# Patient Record
Sex: FEMALE | Race: WHITE | NOT HISPANIC OR LATINO | Employment: OTHER | URBAN - METROPOLITAN AREA
[De-identification: names, ages, dates, MRNs, and addresses within clinical notes are randomized per-mention and may not be internally consistent; named-entity substitution may affect disease eponyms.]

---

## 2017-10-04 ENCOUNTER — APPOINTMENT (OUTPATIENT)
Dept: WOMENS IMAGING | Facility: HOSPITAL | Age: 77
End: 2017-10-04

## 2017-10-04 PROCEDURE — G0202 SCR MAMMO BI INCL CAD: HCPCS | Performed by: RADIOLOGY

## 2017-10-04 PROCEDURE — MDREVIEWSP: Performed by: RADIOLOGY

## 2017-10-04 PROCEDURE — 77063 BREAST TOMOSYNTHESIS BI: CPT | Performed by: RADIOLOGY

## 2017-12-11 ENCOUNTER — APPOINTMENT (OUTPATIENT)
Dept: GENERAL RADIOLOGY | Facility: HOSPITAL | Age: 77
End: 2017-12-11

## 2017-12-11 PROCEDURE — 74022 RADEX COMPL AQT ABD SERIES: CPT | Performed by: FAMILY MEDICINE

## 2018-10-08 ENCOUNTER — APPOINTMENT (OUTPATIENT)
Dept: WOMENS IMAGING | Facility: HOSPITAL | Age: 78
End: 2018-10-08

## 2018-10-08 PROCEDURE — 77063 BREAST TOMOSYNTHESIS BI: CPT | Performed by: RADIOLOGY

## 2018-10-08 PROCEDURE — 77067 SCR MAMMO BI INCL CAD: CPT | Performed by: RADIOLOGY

## 2018-10-08 PROCEDURE — MDREVIEWSP: Performed by: RADIOLOGY

## 2018-12-17 ENCOUNTER — HOSPITAL ENCOUNTER (EMERGENCY)
Facility: HOSPITAL | Age: 78
Discharge: HOME | End: 2018-12-17
Attending: EMERGENCY MEDICINE | Admitting: EMERGENCY MEDICINE

## 2018-12-17 ENCOUNTER — APPOINTMENT (OUTPATIENT)
Dept: CT IMAGING | Facility: HOSPITAL | Age: 78
End: 2018-12-17

## 2018-12-17 VITALS
DIASTOLIC BLOOD PRESSURE: 88 MMHG | HEART RATE: 75 BPM | RESPIRATION RATE: 15 BRPM | WEIGHT: 190 LBS | HEIGHT: 63 IN | OXYGEN SATURATION: 98 % | TEMPERATURE: 98 F | BODY MASS INDEX: 33.66 KG/M2 | SYSTOLIC BLOOD PRESSURE: 164 MMHG

## 2018-12-17 DIAGNOSIS — R10.9 LEFT FLANK PAIN: Primary | ICD-10-CM

## 2018-12-17 LAB
ALBUMIN SERPL-MCNC: 4.4 G/DL (ref 3.5–5.2)
ALBUMIN/GLOB SERPL: 1.3 G/DL
ALP SERPL-CCNC: 83 U/L (ref 39–117)
ALT SERPL W P-5'-P-CCNC: 21 U/L (ref 1–33)
ANION GAP SERPL CALCULATED.3IONS-SCNC: 10.8 MMOL/L
AST SERPL-CCNC: 25 U/L (ref 1–32)
BASOPHILS # BLD AUTO: 0.02 10*3/MM3 (ref 0–0.2)
BASOPHILS NFR BLD AUTO: 0.2 % (ref 0–1.5)
BILIRUB SERPL-MCNC: 0.6 MG/DL (ref 0.1–1.2)
BILIRUB UR QL STRIP: NEGATIVE
BUN BLD-MCNC: 19 MG/DL (ref 8–23)
BUN/CREAT SERPL: 21.3 (ref 7–25)
CALCIUM SPEC-SCNC: 10.1 MG/DL (ref 8.6–10.5)
CHLORIDE SERPL-SCNC: 102 MMOL/L (ref 98–107)
CLARITY UR: CLEAR
CO2 SERPL-SCNC: 26.2 MMOL/L (ref 22–29)
COLOR UR: YELLOW
CREAT BLD-MCNC: 0.89 MG/DL (ref 0.57–1)
DEPRECATED RDW RBC AUTO: 49.3 FL (ref 37–54)
EOSINOPHIL # BLD AUTO: 0.14 10*3/MM3 (ref 0–0.7)
EOSINOPHIL NFR BLD AUTO: 1.6 % (ref 0.3–6.2)
ERYTHROCYTE [DISTWIDTH] IN BLOOD BY AUTOMATED COUNT: 14.6 % (ref 11.7–13)
GFR SERPL CREATININE-BSD FRML MDRD: 61 ML/MIN/1.73
GLOBULIN UR ELPH-MCNC: 3.3 GM/DL
GLUCOSE BLD-MCNC: 100 MG/DL (ref 65–99)
GLUCOSE UR STRIP-MCNC: NEGATIVE MG/DL
HCT VFR BLD AUTO: 39.8 % (ref 35.6–45.5)
HGB BLD-MCNC: 13.4 G/DL (ref 11.9–15.5)
HGB UR QL STRIP.AUTO: NEGATIVE
IMM GRANULOCYTES # BLD: 0.01 10*3/MM3 (ref 0–0.03)
IMM GRANULOCYTES NFR BLD: 0.1 % (ref 0–0.5)
KETONES UR QL STRIP: NEGATIVE
LEUKOCYTE ESTERASE UR QL STRIP.AUTO: NEGATIVE
LIPASE SERPL-CCNC: 38 U/L (ref 13–60)
LYMPHOCYTES # BLD AUTO: 2.19 10*3/MM3 (ref 0.9–4.8)
LYMPHOCYTES NFR BLD AUTO: 24.8 % (ref 19.6–45.3)
MCH RBC QN AUTO: 31.1 PG (ref 26.9–32)
MCHC RBC AUTO-ENTMCNC: 33.7 G/DL (ref 32.4–36.3)
MCV RBC AUTO: 92.3 FL (ref 80.5–98.2)
MONOCYTES # BLD AUTO: 0.59 10*3/MM3 (ref 0.2–1.2)
MONOCYTES NFR BLD AUTO: 6.7 % (ref 5–12)
NEUTROPHILS # BLD AUTO: 5.89 10*3/MM3 (ref 1.9–8.1)
NEUTROPHILS NFR BLD AUTO: 66.7 % (ref 42.7–76)
NITRITE UR QL STRIP: NEGATIVE
PH UR STRIP.AUTO: 7 [PH] (ref 5–8)
PLATELET # BLD AUTO: 255 10*3/MM3 (ref 140–500)
PMV BLD AUTO: 9.4 FL (ref 6–12)
POTASSIUM BLD-SCNC: 4.1 MMOL/L (ref 3.5–5.2)
PROT SERPL-MCNC: 7.7 G/DL (ref 6–8.5)
PROT UR QL STRIP: NEGATIVE
RBC # BLD AUTO: 4.31 10*6/MM3 (ref 3.9–5.2)
SODIUM BLD-SCNC: 139 MMOL/L (ref 136–145)
SP GR UR STRIP: 1.02 (ref 1–1.03)
UROBILINOGEN UR QL STRIP: NORMAL
WBC NRBC COR # BLD: 8.83 10*3/MM3 (ref 4.5–10.7)

## 2018-12-17 PROCEDURE — 83690 ASSAY OF LIPASE: CPT | Performed by: EMERGENCY MEDICINE

## 2018-12-17 PROCEDURE — 80053 COMPREHEN METABOLIC PANEL: CPT | Performed by: EMERGENCY MEDICINE

## 2018-12-17 PROCEDURE — 99283 EMERGENCY DEPT VISIT LOW MDM: CPT

## 2018-12-17 PROCEDURE — 74176 CT ABD & PELVIS W/O CONTRAST: CPT

## 2018-12-17 PROCEDURE — 85025 COMPLETE CBC W/AUTO DIFF WBC: CPT | Performed by: EMERGENCY MEDICINE

## 2018-12-17 PROCEDURE — 81003 URINALYSIS AUTO W/O SCOPE: CPT | Performed by: EMERGENCY MEDICINE

## 2018-12-17 NOTE — ED PROVIDER NOTES
" EMERGENCY DEPARTMENT ENCOUNTER    CHIEF COMPLAINT  Chief Complaint: left flank pain  History given by: patient  History limited by: none  Room Number: 16/16  PMD: Damaris Valenzuela APRN      HPI:  Pt is a 78 y.o. female who presents complaining of left flank pain that began 2 days ago. Pt was seen by [facility] around 1200 and referred to ED for further evaluation. Pt states pain subsided around 1600. Pt denies urinary sx and recent falls/trauma.         Duration:  2 days ago  Onset: gradual  Timing: constant  Location: left  Radiation: none  Quality: pain  Intensity/Severity: moderate  Progression: resolved  Associated Symptoms: none  Aggravating Factors: none  Alleviating Factors: none  Previous Episodes: none  Treatment before arrival: Pt was seen at [facility] around 1200 and referred to ED for further evaluation.     PAST MEDICAL HISTORY  Active Ambulatory Problems     Diagnosis Date Noted   • No Active Ambulatory Problems     Resolved Ambulatory Problems     Diagnosis Date Noted   • No Resolved Ambulatory Problems     Past Medical History:   Diagnosis Date   • Arthritis    • C. difficile colitis    • Disease of thyroid gland    • H/O uterine prolapse        PAST SURGICAL HISTORY  Past Surgical History:   Procedure Laterality Date   • CHOLECYSTECTOMY     • JOINT REPLACEMENT     • UTERINE FIBROID SURGERY      \"pesary\" device used to hold uterus in place due to prolapse       FAMILY HISTORY  Family History   Problem Relation Age of Onset   • Cancer Mother    • Heart attack Father    • Migraines Daughter        SOCIAL HISTORY  Social History     Socioeconomic History   • Marital status: Single     Spouse name: Not on file   • Number of children: Not on file   • Years of education: Not on file   • Highest education level: Not on file   Social Needs   • Financial resource strain: Not on file   • Food insecurity - worry: Not on file   • Food insecurity - inability: Not on file   • Transportation needs - medical: Not on file "   • Transportation needs - non-medical: Not on file   Occupational History   • Not on file   Tobacco Use   • Smoking status: Never Smoker   • Smokeless tobacco: Never Used   Substance and Sexual Activity   • Alcohol use: No   • Drug use: Defer   • Sexual activity: Defer   Other Topics Concern   • Not on file   Social History Narrative   • Not on file       ALLERGIES  Patient has no known allergies.    REVIEW OF SYSTEMS  Review of Systems   Constitutional: Negative for fever.   HENT: Negative for sore throat.    Eyes: Negative.    Respiratory: Negative for cough and shortness of breath.    Cardiovascular: Negative for chest pain.   Gastrointestinal: Negative for abdominal pain, diarrhea and vomiting.   Genitourinary: Positive for flank pain (left). Negative for dysuria.   Musculoskeletal: Negative for neck pain.   Skin: Negative for rash.   Allergic/Immunologic: Negative.    Neurological: Negative for weakness, numbness and headaches.   Hematological: Negative.    Psychiatric/Behavioral: Negative.    All other systems reviewed and are negative.      PHYSICAL EXAM  ED Triage Vitals   Temp Heart Rate Resp BP SpO2   12/17/18 1250 12/17/18 1250 12/17/18 1250 12/17/18 1420 12/17/18 1250   98.1 °F (36.7 °C) 84 16 136/95 97 %      Temp src Heart Rate Source Patient Position BP Location FiO2 (%)   12/17/18 1250 12/17/18 1250 -- -- --   Tympanic Monitor          Physical Exam   Constitutional: She is oriented to person, place, and time. No distress.   HENT:   Head: Normocephalic and atraumatic.   Eyes: EOM are normal. Pupils are equal, round, and reactive to light.   Neck: Normal range of motion. Neck supple.   Cardiovascular: Normal rate, regular rhythm and normal heart sounds.   Pulmonary/Chest: Effort normal and breath sounds normal. No respiratory distress.   Abdominal: Soft. There is no tenderness. There is no rebound and no guarding.   Musculoskeletal: Normal range of motion. She exhibits no edema.   Neurological: She  is alert and oriented to person, place, and time. She has normal sensation and normal strength.   Negative SLR bilaterally   Skin: Skin is warm and dry. No rash noted.   Psychiatric: Mood and affect normal.   Nursing note and vitals reviewed.      LAB RESULTS  Lab Results (last 24 hours)     Procedure Component Value Units Date/Time    POC Urinalysis Dipstick, Multipro (Automated dipstick) [[id number]]  (Abnormal) Collected:  12/17/18 1201    Specimen:  Urine Updated:  12/17/18 1202     Color Yellow     Clarity, UA Slightly Cloudy     Glucose, UA Negative mg/dL      Bilirubin Negative     Ketones, UA Negative     Specific Gravity  1.023     Blood, UA Small     pH, Urine 5.5     Protein, POC Negative mg/dL      Urobilinogen, UA Normal     Nitrite, UA Negative     Leukocytes Negative    CBC & Differential [[id number]] Collected:  12/17/18 1555    Specimen:  Blood Updated:  12/17/18 1613    Narrative:       The following orders were created for panel order CBC & Differential.  Procedure                               Abnormality         Status                     ---------                               -----------         ------                     CBC Auto Differential[[id number]]         Abnormal            Final result                 Please view results for these tests on the individual orders.    Comprehensive Metabolic Panel [[id number]]  (Abnormal) Collected:  12/17/18 1555    Specimen:  Blood from Arm, Left Updated:  12/17/18 1638     Glucose 100 mg/dL      BUN 19 mg/dL      Creatinine 0.89 mg/dL      Sodium 139 mmol/L      Potassium 4.1 mmol/L      Chloride 102 mmol/L      CO2 26.2 mmol/L      Calcium 10.1 mg/dL      Total Protein 7.7 g/dL      Albumin 4.40 g/dL      ALT (SGPT) 21 U/L      AST (SGOT) 25 U/L      Alkaline Phosphatase 83 U/L      Total Bilirubin 0.6 mg/dL      eGFR Non African Amer 61 mL/min/1.73      Globulin 3.3 gm/dL      A/G Ratio 1.3 g/dL      BUN/Creatinine Ratio 21.3     Anion Gap 10.8 mmol/L      Narrative:       The MDRD GFR formula is only valid for adults with stable renal function between ages 18 and 70.    Urinalysis With Culture If Indicated - Urine, Clean Catch [[id number]]  (Normal) Collected:  12/17/18 1555    Specimen:  Urine, Clean Catch Updated:  12/17/18 1618     Color, UA Yellow     Appearance, UA Clear     pH, UA 7.0     Specific Gravity, UA 1.018     Glucose, UA Negative     Ketones, UA Negative     Bilirubin, UA Negative     Blood, UA Negative     Protein, UA Negative     Leuk Esterase, UA Negative     Nitrite, UA Negative     Urobilinogen, UA 1.0 E.U./dL    Narrative:       Urine microscopic not indicated.    CBC Auto Differential [[id number]]  (Abnormal) Collected:  12/17/18 1555    Specimen:  Blood from Arm, Left Updated:  12/17/18 1613     WBC 8.83 10*3/mm3      RBC 4.31 10*6/mm3      Hemoglobin 13.4 g/dL      Hematocrit 39.8 %      MCV 92.3 fL      MCH 31.1 pg      MCHC 33.7 g/dL      RDW 14.6 %      RDW-SD 49.3 fl      MPV 9.4 fL      Platelets 255 10*3/mm3      Neutrophil % 66.7 %      Lymphocyte % 24.8 %      Monocyte % 6.7 %      Eosinophil % 1.6 %      Basophil % 0.2 %      Immature Grans % 0.1 %      Neutrophils, Absolute 5.89 10*3/mm3      Lymphocytes, Absolute 2.19 10*3/mm3      Monocytes, Absolute 0.59 10*3/mm3      Eosinophils, Absolute 0.14 10*3/mm3      Basophils, Absolute 0.02 10*3/mm3      Immature Grans, Absolute 0.01 10*3/mm3     Lipase [[id number]]  (Normal) Collected:  12/17/18 1555    Specimen:  Blood from Arm, Left Updated:  12/17/18 1638     Lipase 38 U/L           I ordered the above labs and reviewed the results    RADIOLOGY  CT Abdomen Pelvis Without Contrast   Preliminary Result   1. 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 There is no   hydronephrosis.   2. Moderately extensive colonic diverticulosis without evidence for   diverticulitis.   3. Lumbar dextroscoliosis and advanced multilevel spondylosis.       Discussed with Dr. Chin.               I  ordered the above noted radiological studies. Interpreted by radiologist. Reviewed by me in PACS.       PROCEDURES  Procedures      PROGRESS AND CONSULTS     1644-. Notified pt of unremarkable lab work and CT abd/pelvis which shows a horseshoe kidney but is negative acute. Pt states she was aware she had a horseshoe kidney. Discussed the plan to discharge the pt home. I instructed the pt to drink plenty of fluids and f/u with her PCP. Pt understands and agrees with the plan, all questions answered.        MEDICAL DECISION MAKING  Results were reviewed/discussed with the patient and they were also made aware of online access. Pt also made aware that some labs, such as cultures, will not be resulted during ER visit and follow up with PMD is necessary.     MDM  Number of Diagnoses or Management Options  Left flank pain:      Amount and/or Complexity of Data Reviewed  Clinical lab tests: reviewed and ordered (WBC-8.83  UA-negative)  Tests in the radiology section of CPT®: reviewed and ordered (CT abd/pelvis-horseshoe kidney, negative acute)  Decide to obtain previous medical records or to obtain history from someone other than the patient: yes  Review and summarize past medical records: yes ([facility] note from  shows Blood, UA-small )  Independent visualization of images, tracings, or specimens: yes           DIAGNOSIS  Final diagnoses:   Left flank pain       DISPOSITION  DISCHARGE    Patient discharged in stable condition.    Reviewed implications of results, diagnosis, meds, responsibility to follow up, warning signs and symptoms of possible worsening, potential complications and reasons to return to ER.    Patient/Family voiced understanding of above instructions.    Discussed plan for discharge, as there is no emergent indication for admission. Patient referred to primary care provider for BP management due to today's BP. Pt/family is agreeable and understands need for follow up and repeat testing.  Pt is aware  that discharge does not mean that nothing is wrong but it indicates no emergency is present that requires admission and they must continue care with follow-up as given below or physician of their choice.     FOLLOW-UP  Damaris Valenzuela APRN  [address] [place] [address] [address]  [facility] [address] [phone]    Schedule an appointment as soon as possible for a visit   If symptoms worsen or return         Medication List      No changes were made to your prescriptions during this visit.           Latest Documented Vital Signs:  As of 4:49 PM  BP- 164/88 HR- 78 Temp- 98.1 °F (36.7 °C) (Tympanic) O2 sat- 98%    --  Documentation assistance provided by louise Murrell for .  Information recorded by the scribe was done at my direction and has been verified and validated by me.     Rani Murrell  12/17/18 165       Elias Patel MD  12/17/18 9260

## 2018-12-17 NOTE — ED NOTES
Pt states she was having some lower flank pain, pt was seen at Urgent care center where she was seen. Pt was told to come here due to blood in urine. Pt states she is having no pain. Pt appears in NAD; pt's breathing is even and unlabored. Call light in reach and bed at lowest position. Reassurance given. Family at bedside.        Josefa España RN  12/17/18 6363

## 2019-01-07 ENCOUNTER — APPOINTMENT (OUTPATIENT)
Dept: GENERAL RADIOLOGY | Facility: HOSPITAL | Age: 79
End: 2019-01-07

## 2019-01-07 PROCEDURE — 71046 X-RAY EXAM CHEST 2 VIEWS: CPT | Performed by: GENERAL PRACTICE

## 2019-10-10 ENCOUNTER — APPOINTMENT (OUTPATIENT)
Dept: WOMENS IMAGING | Facility: HOSPITAL | Age: 79
End: 2019-10-10

## 2019-10-10 PROCEDURE — MDREVIEWSP: Performed by: RADIOLOGY

## 2019-10-10 PROCEDURE — 77063 BREAST TOMOSYNTHESIS BI: CPT | Performed by: RADIOLOGY

## 2019-10-10 PROCEDURE — 77067 SCR MAMMO BI INCL CAD: CPT | Performed by: RADIOLOGY

## 2019-10-18 ENCOUNTER — TRANSCRIBE ORDERS (OUTPATIENT)
Dept: ADMINISTRATIVE | Facility: HOSPITAL | Age: 79
End: 2019-10-18

## 2019-10-18 DIAGNOSIS — Z13.6 ENCOUNTER FOR SCREENING FOR VASCULAR DISEASE: Primary | ICD-10-CM

## 2019-10-31 ENCOUNTER — HOSPITAL ENCOUNTER (OUTPATIENT)
Dept: CARDIOLOGY | Facility: HOSPITAL | Age: 79
Discharge: HOME | End: 2019-10-31
Admitting: NURSE PRACTITIONER

## 2019-10-31 VITALS
HEART RATE: 75 BPM | DIASTOLIC BLOOD PRESSURE: 68 MMHG | HEIGHT: 62 IN | BODY MASS INDEX: 38.83 KG/M2 | WEIGHT: 211 LBS | SYSTOLIC BLOOD PRESSURE: 127 MMHG

## 2019-10-31 DIAGNOSIS — Z13.6 ENCOUNTER FOR SCREENING FOR VASCULAR DISEASE: ICD-10-CM

## 2019-10-31 LAB
BH CV ECHO MEAS - DIST AO DIAM: 1.4 CM
BH CV VAS BP LEFT ARM: NORMAL MMHG
BH CV VAS BP RIGHT ARM: NORMAL MMHG
BH CV XLRA MEAS - MID AO DIAM: 1.69 CM
BH CV XLRA MEAS - PAD LEFT ABI DP: 1.18
BH CV XLRA MEAS - PAD LEFT ABI PT: 1.18
BH CV XLRA MEAS - PAD LEFT ARM: 127 MMHG
BH CV XLRA MEAS - PAD LEFT LEG DP: 150 MMHG
BH CV XLRA MEAS - PAD LEFT LEG PT: 150 MMHG
BH CV XLRA MEAS - PAD RIGHT ABI DP: 1.18
BH CV XLRA MEAS - PAD RIGHT ABI PT: 1.18
BH CV XLRA MEAS - PAD RIGHT ARM: 123 MMHG
BH CV XLRA MEAS - PAD RIGHT LEG DP: 150 MMHG
BH CV XLRA MEAS - PAD RIGHT LEG PT: 150 MMHG
BH CV XLRA MEAS - PROX AO DIAM: 1.87 CM
BH CV XLRA MEAS LEFT ICA/CCA RATIO: 1.16
BH CV XLRA MEAS LEFT MID CCA PSV: NORMAL CM/SEC
BH CV XLRA MEAS LEFT MID ICA PSV: NORMAL CM/SEC
BH CV XLRA MEAS LEFT PROX ECA PSV: NORMAL CM/SEC
BH CV XLRA MEAS RIGHT ICA/CCA RATIO: 0.8
BH CV XLRA MEAS RIGHT MID CCA PSV: NORMAL CM/SEC
BH CV XLRA MEAS RIGHT MID ICA PSV: NORMAL CM/SEC
BH CV XLRA MEAS RIGHT PROX ECA PSV: NORMAL CM/SEC

## 2019-10-31 PROCEDURE — 93799 UNLISTED CV SVC/PROCEDURE: CPT

## 2020-10-14 ENCOUNTER — APPOINTMENT (OUTPATIENT)
Dept: WOMENS IMAGING | Facility: HOSPITAL | Age: 80
End: 2020-10-14

## 2020-10-14 PROCEDURE — 77063 BREAST TOMOSYNTHESIS BI: CPT | Performed by: RADIOLOGY

## 2020-10-14 PROCEDURE — 77067 SCR MAMMO BI INCL CAD: CPT | Performed by: RADIOLOGY

## 2021-05-17 ENCOUNTER — OFFICE (OUTPATIENT)
Dept: URBAN - METROPOLITAN AREA CLINIC 66 | Facility: CLINIC | Age: 81
End: 2021-05-17

## 2021-05-17 VITALS
DIASTOLIC BLOOD PRESSURE: 75 MMHG | TEMPERATURE: 97.2 F | WEIGHT: 209 LBS | SYSTOLIC BLOOD PRESSURE: 120 MMHG | HEART RATE: 81 BPM | HEIGHT: 63 IN

## 2021-05-17 DIAGNOSIS — K59.00 CONSTIPATION, UNSPECIFIED: ICD-10-CM

## 2021-05-17 DIAGNOSIS — R13.10 DYSPHAGIA, UNSPECIFIED: ICD-10-CM

## 2021-05-17 DIAGNOSIS — R05 COUGH: ICD-10-CM

## 2021-05-17 PROCEDURE — 99203 OFFICE O/P NEW LOW 30 MIN: CPT | Performed by: NURSE PRACTITIONER

## 2021-05-19 ENCOUNTER — TRANSCRIBE ORDERS (OUTPATIENT)
Dept: SLEEP MEDICINE | Facility: HOSPITAL | Age: 81
End: 2021-05-19

## 2021-05-19 DIAGNOSIS — Z01.818 OTHER SPECIFIED PRE-OPERATIVE EXAMINATION: Primary | ICD-10-CM

## 2021-05-22 ENCOUNTER — APPOINTMENT (OUTPATIENT)
Dept: LAB | Facility: HOSPITAL | Age: 81
End: 2021-05-22

## 2021-05-22 ENCOUNTER — LAB (OUTPATIENT)
Dept: LAB | Facility: HOSPITAL | Age: 81
End: 2021-05-22

## 2021-05-22 DIAGNOSIS — Z01.818 OTHER SPECIFIED PRE-OPERATIVE EXAMINATION: ICD-10-CM

## 2021-05-22 PROCEDURE — U0004 COV-19 TEST NON-CDC HGH THRU: HCPCS

## 2021-05-22 PROCEDURE — C9803 HOPD COVID-19 SPEC COLLECT: HCPCS

## 2021-05-23 LAB — SARS-COV-2 ORF1AB RESP QL NAA+PROBE: NOT DETECTED

## 2021-05-24 RX ORDER — PANTOPRAZOLE SODIUM 40 MG/1
40 TABLET, DELAYED RELEASE ORAL AS NEEDED
COMMUNITY

## 2021-05-25 ENCOUNTER — HOSPITAL ENCOUNTER (OUTPATIENT)
Facility: HOSPITAL | Age: 81
Setting detail: HOSPITAL OUTPATIENT SURGERY
Discharge: HOME | End: 2021-05-25
Attending: INTERNAL MEDICINE | Admitting: INTERNAL MEDICINE

## 2021-05-25 ENCOUNTER — ANESTHESIA EVENT (OUTPATIENT)
Dept: GASTROENTEROLOGY | Facility: HOSPITAL | Age: 81
End: 2021-05-25

## 2021-05-25 ENCOUNTER — ON CAMPUS - OUTPATIENT (OUTPATIENT)
Dept: URBAN - METROPOLITAN AREA HOSPITAL 114 | Facility: HOSPITAL | Age: 81
End: 2021-05-25

## 2021-05-25 ENCOUNTER — ANESTHESIA (OUTPATIENT)
Dept: GASTROENTEROLOGY | Facility: HOSPITAL | Age: 81
End: 2021-05-25

## 2021-05-25 VITALS
BODY MASS INDEX: 35.58 KG/M2 | HEIGHT: 64 IN | TEMPERATURE: 98.6 F | WEIGHT: 208.4 LBS | SYSTOLIC BLOOD PRESSURE: 135 MMHG | HEART RATE: 70 BPM | RESPIRATION RATE: 12 BRPM | OXYGEN SATURATION: 95 % | DIASTOLIC BLOOD PRESSURE: 77 MMHG

## 2021-05-25 DIAGNOSIS — K22.4 DYSKINESIA OF ESOPHAGUS: ICD-10-CM

## 2021-05-25 DIAGNOSIS — R13.14 DYSPHAGIA, PHARYNGOESOPHAGEAL PHASE: ICD-10-CM

## 2021-05-25 DIAGNOSIS — K22.2 ESOPHAGEAL OBSTRUCTION: ICD-10-CM

## 2021-05-25 PROCEDURE — 43450 DILATE ESOPHAGUS 1/MULT PASS: CPT | Performed by: INTERNAL MEDICINE

## 2021-05-25 PROCEDURE — 43235 EGD DIAGNOSTIC BRUSH WASH: CPT | Performed by: INTERNAL MEDICINE

## 2021-05-25 PROCEDURE — 25010000002 PROPOFOL 10 MG/ML EMULSION: Performed by: ANESTHESIOLOGY

## 2021-05-25 RX ORDER — SODIUM CHLORIDE 0.9 % (FLUSH) 0.9 %
3 SYRINGE (ML) INJECTION EVERY 12 HOURS SCHEDULED
Status: DISCONTINUED | OUTPATIENT
Start: 2021-05-25 | End: 2021-05-25 | Stop reason: HOSPADM

## 2021-05-25 RX ORDER — PROPOFOL 10 MG/ML
VIAL (ML) INTRAVENOUS CONTINUOUS PRN
Status: DISCONTINUED | OUTPATIENT
Start: 2021-05-25 | End: 2021-05-25 | Stop reason: SURG

## 2021-05-25 RX ORDER — LIDOCAINE HYDROCHLORIDE 20 MG/ML
INJECTION, SOLUTION INFILTRATION; PERINEURAL AS NEEDED
Status: DISCONTINUED | OUTPATIENT
Start: 2021-05-25 | End: 2021-05-25 | Stop reason: SURG

## 2021-05-25 RX ORDER — SODIUM CHLORIDE 0.9 % (FLUSH) 0.9 %
10 SYRINGE (ML) INJECTION AS NEEDED
Status: DISCONTINUED | OUTPATIENT
Start: 2021-05-25 | End: 2021-05-25 | Stop reason: HOSPADM

## 2021-05-25 RX ORDER — SODIUM CHLORIDE, SODIUM LACTATE, POTASSIUM CHLORIDE, CALCIUM CHLORIDE 600; 310; 30; 20 MG/100ML; MG/100ML; MG/100ML; MG/100ML
30 INJECTION, SOLUTION INTRAVENOUS CONTINUOUS PRN
Status: DISCONTINUED | OUTPATIENT
Start: 2021-05-25 | End: 2021-05-25 | Stop reason: HOSPADM

## 2021-05-25 RX ADMIN — SODIUM CHLORIDE, POTASSIUM CHLORIDE, SODIUM LACTATE AND CALCIUM CHLORIDE 30 ML/HR: 600; 310; 30; 20 INJECTION, SOLUTION INTRAVENOUS at 07:43

## 2021-05-25 RX ADMIN — LIDOCAINE HYDROCHLORIDE 60 MG: 20 INJECTION, SOLUTION INFILTRATION; PERINEURAL at 08:30

## 2021-05-25 RX ADMIN — PROPOFOL 200 MCG/KG/MIN: 10 INJECTION, EMULSION INTRAVENOUS at 08:30

## 2021-05-25 NOTE — ANESTHESIA POSTPROCEDURE EVALUATION
"Patient: Madisyn Plascencia    Procedure Summary     Date: 05/25/21 Room / Location:  [facility] /  [facility]    Anesthesia Start: 0827 Anesthesia Stop: 0843    Procedure: ESOPHAGOGASTRODUODENOSCOPY WITH 52 Yi BRADFORD DILATION (N/A Esophagus) Diagnosis:     Surgeons: Tripp Pascal MD Provider: Eder Hawk MD    Anesthesia Type: MAC ASA Status: 3          Anesthesia Type: MAC    Vitals  Vitals Value Taken Time   /77 05/25/21 0906   Temp     Pulse 70 05/25/21 0906   Resp 12 05/25/21 0906   SpO2 95 % 05/25/21 0906           Post Anesthesia Care and Evaluation    Patient location during evaluation: PACU  Patient participation: complete - patient participated  Level of consciousness: awake  Pain score: 0  Pain management: adequate  Airway patency: patent  Anesthetic complications: No anesthetic complications  PONV Status: none  Cardiovascular status: acceptable  Respiratory status: acceptable  Hydration status: acceptable    Comments: /77   Pulse 70   Temp 37 °C (98.6 °F) (Oral)   Resp 12   Ht 161.3 cm (63.5\")   Wt 94.5 kg (208 lb 6.4 oz)   SpO2 95%   BMI 36.34 kg/m²       " Clinic hours for [facility] Ayala:  Monday 8:00am - 4:00pm  Tuesday 8:40am - 4:40pm  Wednesday 8:00am - 4:00pm  Thursday 8:00am - 4:00pm  Friday      8:00am - 12:00pm    If you need a refill on your prescription, please call your pharmacy and let them know. Please be proactive and call before your medication runs out. The pharmacy will then contact us for the refill. Please allow 24-48 hours for the refill to be processed.     If your Specialty Provider has ordered additional laboratory or radiology testing the results will be discussed at your next visit. This will allow you the opportunity to go over the results in person with your provider. If your results require immediate intervention, you will be contacted sooner by phone call.    You may be receiving a patient satisfaction survey in the mail or in your email.  If you receive an email survey, please look for the subject line of:   \" Your provider name\" would like your feedback\".   Please take the time to complete your survey either via the mail or email, as your feedback is very important to us.  We strive to make your experience exceptional and your comments help us with that goal.  We look forward to hearing from you.         Increase Lyrica to 100mg twice daily.  Increase Tresiba insulin to 68 units. In 3-5 days, if blood sugars are ALL above 120mg/dL, increase Tresiba insulin from 68 to 71 units daily. In 10 days, if blood sugars are ALL above 120mg/dL, increase Tresiba insulin from 71 to 74 units daily.   Continue Glipizide 5mg before breakfast and dinner.   Continue Actos 30mg daily.  Take Humalog insulin scale before every meal. Don't take if skipping the meal.   Glucose level  inject 20 units of Humalog insulin.  Glucose level 101-180 mg/dl inject 30 units of Humalog insulin.  Glucose level 181-250 mg/dl inject 35 units of Humalog insulin.  Glucose level 251-350 mg/dl inject 40 units of Humalog insulin.  Glucose level above 351 mg/dl inject  45 units of Humalog insulin.  Continue with other medications.  Follow up with me in 2 months with glucometer.  Nonfasting HgbA1c and urine microalbumin 1 week before office visit.

## 2021-05-25 NOTE — ANESTHESIA PREPROCEDURE EVALUATION
Anesthesia Evaluation     Patient summary reviewed and Nursing notes reviewed   NPO Solid Status: > 8 hours  NPO Liquid Status: > 2 hours           Airway   Mallampati: I  TM distance: >3 FB  Neck ROM: full  No difficulty expected  Dental - normal exam     Pulmonary - negative pulmonary ROS and normal exam   Cardiovascular - normal exam    (+) hypertension,       Neuro/Psych- negative ROS  GI/Hepatic/Renal/Endo    (+) obesity,   liver disease history of elevated LFT, renal disease CRI, thyroid problem hypothyroidism    Musculoskeletal (-) negative ROS    Abdominal  - normal exam    Bowel sounds: normal.   Substance History - negative use     OB/GYN negative ob/gyn ROS         Other                      Anesthesia Plan    ASA 3     MAC       Anesthetic plan, all risks, benefits, and alternatives have been provided, discussed and informed consent has been obtained with: patient.

## 2021-05-25 NOTE — H&P
"[facility]   HISTORY AND PHYSICAL    Patient Name: Madisyn Plascencia  : 1940  MRN: [id number]  Primary Care Physician:  Damaris Valenzuela APRN  Date of admission: 2021    Subjective   Subjective     Chief Complaint: trouble swallowing    History of Present Illness  Has some issues with swallowing pills, solid foods. She has constipation for which she uses laxatives that seem to work   Review of Systems   HENT: Positive for trouble swallowing.         Personal History     Past Medical History:   Diagnosis Date   • Arthritis    • C. difficile colitis    • Disease of thyroid gland    • GERD (gastroesophageal reflux disease)    • H/O uterine prolapse    • Hyperlipidemia    • Hypertension        Past Surgical History:   Procedure Laterality Date   • CHOLECYSTECTOMY     • COLONOSCOPY     • ENDOSCOPY     • JOINT REPLACEMENT     • TONSILLECTOMY     • UTERINE FIBROID SURGERY      \"pesary\" device used to hold uterus in place due to prolapse       Family History: family history includes Cancer in her mother; Heart attack in her father; Migraines in her daughter. Otherwise pertinent FHx was reviewed and not pertinent to current issue.    Social History:  reports that she has never smoked. She has never used smokeless tobacco. Drug use questions deferred to the physician. She reports that she does not drink alcohol.    Home Medications:  aspirin, atorvastatin, celecoxib, levothyroxine, pantoprazole, ramipril, vitamin C, vitamin D3, vitamin E, and zolpidem    Allergies:  No Known Allergies    Objective    Objective     Vitals:   Temp:  [98.6 °F (37 °C)] 98.6 °F (37 °C)  Heart Rate:  [73] 73  Resp:  [16] 16  BP: (142)/(98) 142/98    Physical Exam  HENT:      Right Ear: External ear normal.      Left Ear: External ear normal.      Mouth/Throat:      Pharynx: Oropharynx is clear.   Cardiovascular:      Rate and Rhythm: Normal rate.      Pulses: Normal pulses.   Pulmonary:      Effort: Pulmonary effort is normal. "   Abdominal:      General: Abdomen is flat.   Neurological:      General: No focal deficit present.      Mental Status: She is alert.   Psychiatric:         Mood and Affect: Mood normal.         Result Review    Result Review:  I have personally reviewed the results from the time of this admission to 5/25/2021 08:31 EDT and agree with these findings:  []  Laboratory  []  Microbiology  []  Radiology  []  EKG/Telemetry   []  Cardiology/Vascular   []  Pathology  []  Old records  []  Other:  Assessment/Plan   Assessment / Plan     Brief Patient Summary:  Madisyn Plascencia is a 80 y.o. female   Solid food dysphagia     Active Hospital Problems:  There are no active hospital problems to display for this patient.    Plan: Upper tract endoscopy, risks, alternatives and benefits discussed with patient and patient is agreeable to proceed      Electronically signed by Tripp Pascal MD, 05/25/21, 8:31 AM EDT.

## 2021-06-09 ENCOUNTER — OFFICE (OUTPATIENT)
Dept: URBAN - METROPOLITAN AREA CLINIC 66 | Facility: CLINIC | Age: 81
End: 2021-06-09

## 2021-06-09 VITALS
HEIGHT: 63 IN | WEIGHT: 209 LBS | SYSTOLIC BLOOD PRESSURE: 150 MMHG | DIASTOLIC BLOOD PRESSURE: 89 MMHG | HEART RATE: 82 BPM

## 2021-06-09 DIAGNOSIS — R13.10 DYSPHAGIA, UNSPECIFIED: ICD-10-CM

## 2021-06-09 DIAGNOSIS — R05 COUGH: ICD-10-CM

## 2021-06-09 DIAGNOSIS — K59.00 CONSTIPATION, UNSPECIFIED: ICD-10-CM

## 2021-06-09 PROCEDURE — 99214 OFFICE O/P EST MOD 30 MIN: CPT | Performed by: NURSE PRACTITIONER

## 2021-10-15 ENCOUNTER — APPOINTMENT (OUTPATIENT)
Dept: WOMENS IMAGING | Facility: HOSPITAL | Age: 81
End: 2021-10-15

## 2021-10-15 PROCEDURE — 77063 BREAST TOMOSYNTHESIS BI: CPT | Performed by: RADIOLOGY

## 2021-10-15 PROCEDURE — 77067 SCR MAMMO BI INCL CAD: CPT | Performed by: RADIOLOGY

## 2022-03-25 ENCOUNTER — HOSPITAL ENCOUNTER (OUTPATIENT)
Dept: GENERAL RADIOLOGY | Facility: HOSPITAL | Age: 82
Discharge: HOME | End: 2022-03-25
Admitting: STUDENT IN AN ORGANIZED HEALTH CARE EDUCATION/TRAINING PROGRAM

## 2022-03-25 DIAGNOSIS — M79.671 PAIN IN RIGHT FOOT: ICD-10-CM

## 2022-03-25 DIAGNOSIS — M25.571 ARTHRALGIA OF RIGHT FOOT: ICD-10-CM

## 2022-03-25 DIAGNOSIS — M79.671 PAIN IN RIGHT FOOT: Primary | ICD-10-CM

## 2022-03-25 PROCEDURE — 73630 X-RAY EXAM OF FOOT: CPT

## 2022-03-29 ENCOUNTER — OFFICE VISIT (OUTPATIENT)
Dept: SPORTS MEDICINE | Facility: CLINIC | Age: 82
End: 2022-03-29

## 2022-03-29 VITALS
OXYGEN SATURATION: 98 % | WEIGHT: 214 LBS | BODY MASS INDEX: 36.54 KG/M2 | HEIGHT: 64 IN | HEART RATE: 88 BPM | DIASTOLIC BLOOD PRESSURE: 64 MMHG | TEMPERATURE: 99.3 F | SYSTOLIC BLOOD PRESSURE: 116 MMHG

## 2022-03-29 DIAGNOSIS — M75.52 SUBDELTOID BURSITIS OF LEFT SHOULDER JOINT: Primary | Chronic | ICD-10-CM

## 2022-03-29 DIAGNOSIS — M75.51 SUBDELTOID BURSITIS OF RIGHT SHOULDER JOINT: Chronic | ICD-10-CM

## 2022-03-29 PROCEDURE — 73030 X-RAY EXAM OF SHOULDER: CPT | Performed by: FAMILY MEDICINE

## 2022-03-29 PROCEDURE — 99203 OFFICE O/P NEW LOW 30 MIN: CPT | Performed by: FAMILY MEDICINE

## 2022-03-29 PROCEDURE — 20610 DRAIN/INJ JOINT/BURSA W/O US: CPT | Performed by: FAMILY MEDICINE

## 2022-03-29 RX ORDER — TRIAMCINOLONE ACETONIDE 40 MG/ML
40 INJECTION, SUSPENSION INTRA-ARTICULAR; INTRAMUSCULAR ONCE
Status: COMPLETED | OUTPATIENT
Start: 2022-03-29 | End: 2022-03-29

## 2022-03-29 RX ADMIN — TRIAMCINOLONE ACETONIDE 40 MG: 40 INJECTION, SUSPENSION INTRA-ARTICULAR; INTRAMUSCULAR at 15:05

## 2022-03-29 NOTE — PROGRESS NOTES
"Chief Complaint  Arm Pain (BILATERAL; states that the pain started a month ago; she assumes it is arthritis but wanted to get further eval)    Subjective          Madisyn Plascencia presents to [facility]  History of Present Illness  Several months ago patient began to note pain in the right upper arm as she points to the deltoid insertion.  Was worse with having her shoulder overhead and worse with sleeping on her right side.  She has seen some mild improvement over the past week or so but is still a bother and interferes with ADLs.  She also has begun to note very mild similar symptoms on the left.    Objective   Vital Signs:   /64 (BP Location: Right arm, Patient Position: Sitting, Cuff Size: Adult)   Pulse 88   Temp 99.3 °F (37.4 °C) (Temporal)   Ht 161.3 cm (63.5\")   Wt 97.1 kg (214 lb)   SpO2 98%   BMI 37.31 kg/m²            Physical Exam  Vitals reviewed.   Constitutional:       Appearance: She is well-developed.   HENT:      Head: Normocephalic and atraumatic.   Eyes:      Conjunctiva/sclera: Conjunctivae normal.      Pupils: Pupils are equal, round, and reactive to light.   Cardiovascular:      Comments: No peripheral edema  Pulmonary:      Effort: Pulmonary effort is normal.   Musculoskeletal:      Comments: Left shoulder normal in general appearance.  Patient does have some mild tenderness to palpation of the subacromial space and mild Neer and Wu negative empty can.  Negative McCone.  Motor 5 out of 5.    Right shoulder normal in general appearance, tenderness with palpation of the subacromial space.  Positive empty can, positive Neer and Wu.  Negative McCone negative drop arm.   Skin:     General: Skin is warm and dry.   Neurological:      Mental Status: She is alert and oriented to person, place, and time.   Psychiatric:         Behavior: Behavior normal.        Result Review :                Bilateral Shoulder X-Ray  Indication: Pain  Views: AP " "Internal and External Rotation    Findings:  No fracture  No bony lesion  Normal soft tissues  Mild OA AC joints    No prior studies were available for comparison.      Discussed with the patient diagnosis of subdeltoid bursitis.  Given her treatment options including corticosteroid injection of the right shoulder along with physical therapy.  She would like to proceed with that today if possible.    Shoulder Injection Procedure Note    Right shoulder subacromial bursa injection was discussed with the patient in detail, including indication, risks, benefits, and alternatives. Verbal consent was given for the procedure. Injection was performed by physician.  Injection site was identified by physical examination and cleaned with Betadine and alcohol swabs. Prior to needle insertion, ethyl chloride spray was used for surface anesthesia. Sterile technique was used.  A 25-gauge, 1.5\" needle was directed to the joint from a(n) lateral approach. Injectate was passed into the subacromial bursa without difficulty. The needle was removed and a simple bandage was applied. The procedure was tolerated well without difficulty.    Injection mixture:  1% lidocaine without epinephrine: 3 mL  40 mg/mL triamcinolone acetonide: 1 mL      Assessment and Plan    Diagnoses and all orders for this visit:    1. Subdeltoid bursitis of left shoulder joint (Primary)  -     XR Shoulder 2+ View Bilateral  -     Ambulatory Referral to Physical Therapy    2. Subdeltoid bursitis of right shoulder joint  -     XR Shoulder 2+ View Bilateral  -     Ambulatory Referral to Physical Therapy  -     triamcinolone acetonide (KENALOG-40) injection 40 mg        Postinjection instruction given regarding ice and activity.  We will start physical therapy for both shoulders, if her left worsens then certainly can return for an injection of the left.      Follow Up   No follow-ups on file.  Patient was given instructions and counseling regarding her condition or " for health maintenance advice. Please see specific information pulled into the AVS if appropriate.

## 2022-03-31 ENCOUNTER — PATIENT ROUNDING (BHMG ONLY) (OUTPATIENT)
Dept: SPORTS MEDICINE | Facility: CLINIC | Age: 82
End: 2022-03-31

## 2022-05-03 ENCOUNTER — TELEPHONE (OUTPATIENT)
Dept: SPORTS MEDICINE | Facility: CLINIC | Age: 82
End: 2022-05-03

## 2022-05-03 NOTE — TELEPHONE ENCOUNTER
Provider: DR. ALICIA SHRESTHA  Caller:  SHANAE LAGUNA  Relationship to Patient: SELF  Pharmacy:   Phone Number:  [phone] (CAN LEAVE A VOICE MAIL MESSAGE)  Reason for Call:  PATIENT WAS SEEN ON 3/29/22 FOR BILATERAL SHOULDERS AND GOT INJECTION IN RIGHT SHOULDER ONLY. PATIENT WANTS TO KNOW IF SHE CAN COME IN AND HAVE THE LEFT SHOULDER INJECTION DONE NOW.  When was the patient last seen:  3/29/22

## 2022-05-11 ENCOUNTER — PROCEDURE VISIT (OUTPATIENT)
Dept: SPORTS MEDICINE | Facility: CLINIC | Age: 82
End: 2022-05-11

## 2022-05-11 VITALS
SYSTOLIC BLOOD PRESSURE: 106 MMHG | OXYGEN SATURATION: 97 % | HEIGHT: 64 IN | TEMPERATURE: 97.5 F | BODY MASS INDEX: 35.68 KG/M2 | DIASTOLIC BLOOD PRESSURE: 60 MMHG | HEART RATE: 90 BPM | WEIGHT: 209 LBS

## 2022-05-11 DIAGNOSIS — M75.52 SUBDELTOID BURSITIS OF LEFT SHOULDER JOINT: Primary | Chronic | ICD-10-CM

## 2022-05-11 DIAGNOSIS — M75.51 SUBDELTOID BURSITIS OF RIGHT SHOULDER JOINT: Chronic | ICD-10-CM

## 2022-05-11 PROCEDURE — 99214 OFFICE O/P EST MOD 30 MIN: CPT | Performed by: FAMILY MEDICINE

## 2022-05-11 PROCEDURE — 20610 DRAIN/INJ JOINT/BURSA W/O US: CPT | Performed by: FAMILY MEDICINE

## 2022-05-11 RX ORDER — TRIAMCINOLONE ACETONIDE 40 MG/ML
40 INJECTION, SUSPENSION INTRA-ARTICULAR; INTRAMUSCULAR ONCE
Status: COMPLETED | OUTPATIENT
Start: 2022-05-11 | End: 2022-05-11

## 2022-05-11 RX ADMIN — TRIAMCINOLONE ACETONIDE 40 MG: 40 INJECTION, SUSPENSION INTRA-ARTICULAR; INTRAMUSCULAR at 14:07

## 2022-05-11 NOTE — PROGRESS NOTES
"Chief Complaint  Shoulder Pain (Pt states that she would like to get a injection on her LT shoulder but also would like to discuss possibly getting it on the right shoulder as well. )    Subjective          Madisyn Plascencia presents to [facility]  History of Present Illness  Patient with chronic bilateral subdeltoid bursitis impingement shoulders.  Received corticosteroid injection on the right side on 3/29/2022, patient states the injection gave her \"almost immediate relief but only lasted about a month\".  She is wondering if she can get another injection on the right today.  She states that the left, which she has not had injection  In has progressively gotten worse over the past 6 to 8 weeks.  She has made several attempts of physical therapy is unable to be seen by physical therapy until possibly next week.      nObjective   Vital Signs:  /60 (BP Location: Left arm, Patient Position: Sitting, Cuff Size: Adult)   Pulse 90   Temp 97.5 °F (36.4 °C) (Temporal)   Ht 161.3 cm (63.5\")   Wt 94.8 kg (209 lb)   SpO2 97%   BMI 36.44 kg/m²           Physical Exam  Vitals reviewed.   Constitutional:       Appearance: She is well-developed.   HENT:      Head: Normocephalic and atraumatic.   Eyes:      Conjunctiva/sclera: Conjunctivae normal.      Pupils: Pupils are equal, round, and reactive to light.   Cardiovascular:      Comments: No peripheral edema  Pulmonary:      Effort: Pulmonary effort is normal.   Musculoskeletal:      Comments: Bilateral shoulder exam with positive Neer and Wu and empty can.  Negative drop arm.  Negative Loomis.  Motor 5 out of 5.   Skin:     General: Skin is warm and dry.   Neurological:      Mental Status: She is alert and oriented to person, place, and time.   Psychiatric:         Behavior: Behavior normal.        Result Review :               Discussed with the patient that it is too soon for repeat injection on the right and we really need to " "wait until the end of June for this.  We will go ahead and inject the left subacromial bursa today.    Shoulder Injection Procedure Note    Left shoulder subacromial bursa injection was discussed with the patient in detail, including indication, risks, benefits, and alternatives. Verbal consent was given for the procedure. Injection was performed by physician.  Injection site was identified by physical examination and cleaned with Betadine and alcohol swabs. Prior to needle insertion, ethyl chloride spray was used for surface anesthesia. Sterile technique was used.  A 25-gauge, 1.5\" needle was directed to the joint from a(n) posterior approach. Injectate was passed into the subacromial bursa without difficulty. The needle was removed and a simple bandage was applied. The procedure was tolerated well without difficulty.    Injection mixture:  2% lidocaine without epinephrine: 1.5 mL  40 mg/mL triamcinolone acetonide: 1 mL      Assessment and Plan    Diagnoses and all orders for this visit:    1. Subdeltoid bursitis of left shoulder joint (Primary)  -     triamcinolone acetonide (KENALOG-40) injection 40 mg    2. Subdeltoid bursitis of right shoulder joint      Postinjection instructions given regarding ice and activity.,  Suggest starting physical therapy as scheduled for 5/18/2022.  May return end of June 1 July for repeat right shoulder injection if needed.       Follow Up   No follow-ups on file.  Patient was given instructions and counseling regarding her condition or for health maintenance advice. Please see specific information pulled into the AVS if appropriate.       "

## 2022-05-18 ENCOUNTER — HOSPITAL ENCOUNTER (OUTPATIENT)
Dept: PHYSICAL THERAPY | Facility: HOSPITAL | Age: 82
Setting detail: THERAPIES SERIES
Discharge: HOME | End: 2022-05-18

## 2022-05-18 DIAGNOSIS — G89.29 CHRONIC PAIN OF BOTH SHOULDERS: Primary | ICD-10-CM

## 2022-05-18 DIAGNOSIS — M75.82 TENDONITIS OF BOTH ROTATOR CUFFS: ICD-10-CM

## 2022-05-18 DIAGNOSIS — M25.512 CHRONIC PAIN OF BOTH SHOULDERS: Primary | ICD-10-CM

## 2022-05-18 DIAGNOSIS — M75.81 TENDONITIS OF BOTH ROTATOR CUFFS: ICD-10-CM

## 2022-05-18 DIAGNOSIS — M25.511 CHRONIC PAIN OF BOTH SHOULDERS: Primary | ICD-10-CM

## 2022-05-18 PROCEDURE — 97110 THERAPEUTIC EXERCISES: CPT

## 2022-05-18 PROCEDURE — 97161 PT EVAL LOW COMPLEX 20 MIN: CPT

## 2022-05-18 NOTE — THERAPY EVALUATION
"    Outpatient Physical Therapy Ortho Initial Evaluation  [facility]     Patient Name: Madisyn Plascencia  : 1940  MRN: [id number]  Today's Date: 2022      Visit Date: 2022    There is no problem list on file for this patient.       Past Medical History:   Diagnosis Date   • Arthritis    • C. difficile colitis    • Disease of thyroid gland    • GERD (gastroesophageal reflux disease)    • H/O uterine prolapse    • Hyperlipidemia    • Hypertension    • Hypothyroidism    • Osteopenia    • Scoliosis    • Shoulder injury         Past Surgical History:   Procedure Laterality Date   • CHOLECYSTECTOMY     • COLONOSCOPY     • ENDOSCOPY     • ENDOSCOPY N/A 2021    Procedure: ESOPHAGOGASTRODUODENOSCOPY WITH 52 [place] BRADFORD DILATION;  Surgeon: Tripp Pascal MD;  Location: [facility] ENDOSCOPY;  Service: Gastroenterology;  Laterality: N/A;  PRE- DYSPHAGIA  POST- ESOPHAGEAL RING   • JOINT REPLACEMENT     • KNEE SURGERY     • TONSILLECTOMY     • UTERINE FIBROID SURGERY      \"pesary\" device used to hold uterus in place due to prolapse       Visit Dx:     ICD-10-CM ICD-9-CM   1. Chronic pain of both shoulders  M25.511 719.41    G89.29 338.29    M25.512    2. Tendonitis of both rotator cuffs  M75.81 726.10    M75.82           Patient History     Row Name 22 1400             History    Chief Complaint Pain  -SHARI      Type of Pain Shoulder pain  -SHARI      Date Current Problem(s) Began 22  -SHARI      Brief Description of Current Complaint Madisyn Plascencia is a 81 y.o. female who presents to physical therapy today with primary compliant of B shoulder pain (R>L) that began approximately 2-3 months ago. Patient expresses she may feel pain is related to repetitively carrying [employer] grocery bag up flight of stairs to enter apartment.  Patient received cortisone injection in R shoulder on 3/29/22 leading to significant reduction in R shoulder pain for approximately 1 month. Patient then received injection in L " shoulder on 5/11/22 leading to 80% reduction in pain. Patient awaiting second injection in R shoulder in June 2022. Madisyn Plascencia reports difficulty/increased pain with combing hair, sleeping, putting shirt over her head, reaching related activies, and reaching behind to don jacket. Pain relieving factors include warm shower, ice, and injections. PMH includes HTN, L knee OA and hx of R TKR (2011). Patient ambulates with SPC (R side) due to intermittent L knee buckling.  Madisyn Plascencia primary goal for attending skilled physical therapy is to learn exercises/stretches to reduce pain and improve activity tolerance.  -SHARI      Previous treatment for THIS PROBLEM Injections  -SHARI      Hand Dominance right-handed  -SHARI      What clinical tests have you had for this problem? X-ray  -SHARI      Results of Clinical Tests see epic  -SHARI      Are you or can you be pregnant No  -SHARI              Pain     Pain Location Shoulder  -SHARI      Pain at Present 2  -SHARI      Pain at Best 2  -SHARI      Pain at Worst 8  -SHARI      Pain Description Sharp;Aching  -SHARI      What Performance Factors Make the Current Problem(s) WORSE? combing hair, sleeping, putting shirt over her head, reaching related activies, reaching behind to put jacket on  -SHARI      What Performance Factors Make the Current Problem(s) BETTER? warm shower, ice, and injections  -SHARI      Is your sleep disturbed? Yes  -SHARI              Fall Risk Assessment    Any falls in the past year: No  -SHARI              Services    Prior Rehab/Home Health Experiences No  -SHARI      Are you currently receiving Home Health services No  -SHARI      Do you plan to receive Home Health services in the near future No  -SHARI              Daily Activities    Primary Language English  -SHARI      Pt Participated in POC and Goals Yes  -SHARI            User Key  (r) = Recorded By, (t) = Taken By, (c) = Cosigned By    Initials Name Provider Type    Rosi Logan, PT Physical Therapist                 PT Ortho     Row  Name 05/18/22 1400       Posture/Observations    Alignment Options Forward head;Thoracic kyphosis;Rounded shoulders;Scoliosis  -SHARI    Forward Head Mild  -SHARI    Thoracic Kyphosis Mild  -SHARI    Rounded Shoulders Moderate  -SHARI    Scoliosis Mild  -SHARI       Special Tests/Palpation    Special Tests/Palpation Shoulder  -SHARI       Shoulder Girdle Accessory Motions    Shoulder Girdle Accessory Motions Tested? Yes  -SHARI    Posterior glide of humerus Right:;Left:;Hypomobile  -SHARI    Inferior glide of humerus Right:;Left:;Hypomobile  -SHARI       Shoulder Impingement/Rotator Cuff Special Tests    Wu-Bebeto Test (RC Lesion vs. Bursitis) Bilateral:;Positive  -SHARI    Neer Impingement Test (RC Lesion vs. Bursitis) Bilateral:;Positive  -SHARI    Empty Can Test (RC Lesion) Right:;Positive  -SHARI    Drop Arm Test (Full Thickness RC Lesion) Bilateral:;Negative  -SHARI    Speed's Test (LH of Biceps Lesion) Bilateral:;Negative  -SHARI       Shoulder Girdle Palpation    Shoulder Girdle Palpation? Yes  -SHARI    Supraspinatus Insertion Right:;Tender  -SHARI    Deltoid Bilateral:;Tender  -SHARI       General ROM    RT Upper Ext Rt Shoulder ABduction;Rt Shoulder Flexion;Rt Shoulder External Rotation;Rt Shoulder Internal Rotation  -SHARI    LT Upper Ext Lt Shoulder ABduction;Lt Shoulder Flexion;Lt Shoulder External Rotation;Lt Shoulder Internal Rotation  -SHARI       Right Upper Ext    Rt Shoulder Abduction AROM 120  -SHARI    Rt Shoulder Flexion AROM 145  -SHARI    Rt Shoulder External Rotation AROM LAKISHA= C7  -SHARI    Rt Shoulder Internal Rotation AROM FIR= L3  -SHARI       Left Upper Ext    Lt Shoulder Abduction AROM 150  -SHARI    Lt Shoulder Flexion AROM 135  -SHARI    Lt Shoulder External Rotation AROM LAKISHA= T2  -SHARI    Lt Shoulder Internal Rotation AROM FIR= T12  pain noted on R  -SHARI       MMT (Manual Muscle Testing)    Rt Upper Ext Rt Shoulder Flexion;Rt Shoulder ABduction;Rt Shoulder Internal Rotation;Rt Shoulder External Rotation;Rt Elbow Flexion;Rt Elbow Extension  -SHARI    Lt  Upper Ext Lt Shoulder Flexion;Lt Shoulder ABduction;Lt Shoulder Internal Rotation;Lt Shoulder External Rotation;Lt Elbow Extension;Lt Elbow Flexion  -SHARI       MMT Right Upper Ext    Rt Shoulder Flexion MMT, Gross Movement (4/5) good  -SHARI    Rt Shoulder ABduction MMT, Gross Movement (4/5) good  -SHARI    Rt Shoulder Internal Rotation MMT, Gross Movement (4+/5) good plus  -SHARI    Rt Shoulder External Rotation MMT, Gross Movement (4-/5) good minus  -SHARI    Rt Elbow Flexion MMT, Gross Movement: (4+/5) good plus  -SHARI    Rt Elbow Extension MMT, Gross Movement: (4+/5) good plus  -SHARI       MMT Left Upper Ext    Lt Shoulder Flexion MMT, Gross Movement (4/5) good  -SHARI    Lt Shoulder ABduction MMT, Gross Movement (4/5) good  -SHARI    Lt Shoulder Internal Rotation MMT, Gross Movement (4+/5) good plus  -SHARI    Lt Shoulder External Rotation MMT, Gross Movement (4/5) good  -SHARI    Lt Elbow Flexion MMT, Gross Movement (4+/5) good plus  -SHARI    Lt Elbow Extension MMT, Gross Movement (4+/5) good plus  -SHARI       Sensation    Sensation WNL? WNL  -SHARI          User Key  (r) = Recorded By, (t) = Taken By, (c) = Cosigned By    Initials Name Provider Type    Rosi Logan, PT Physical Therapist                            Therapy Education  Education Details: Educated on anatomy/pathology, evaluation findings, therapy expectations, postural awareness, POC and HEP  Given: HEP, Symptoms/condition management, Pain management, Posture/body mechanics  Program: New  How Provided: Verbal, Demonstration, Written  Provided to: Patient  Level of Understanding: Verbalized, Demonstrated  99894 - PT Self Care/Mgmt Minutes: 5      PT OP Goals     Row Name 05/18/22 1400          PT Short Term Goals    STG Date to Achieve 05/18/22  -SHARI     STG 1 Pt will be independent and verbalized good understanding of initial HEP to improve ability to manage pain, as well as improve strength and ROM.  -SHARI     STG 1 Progress New  -SHARI     STG 2 Patient will report 50%  reduction in B shoulder pain/ROM limitation when reaching overhead and behind her back to don jacket.  -SHARI     STG 2 Progress New  -            Long Term Goals    LTG Date to Achieve 07/17/22  -SHARI     LTG 1 Pt will be independent and verbalized good understanding of advanced HEP to improve ability to manage pain, as well as improve strength and ROM.  -SHARI     LTG 1 Progress New  -SHARI     LTG 2 Pt will report 3/10 pain at worst in B shoulder with overhead activities to improve participation in ADLs.  -SHARI     LTG 2 Progress New  -SHARI     LTG 3 Patient will improve ability to sleep through the night with 0 sleep disturbances related to shoulder pain to improve overall sleep quality and quality of life  -SHARI     LTG 3 Progress New  -SHARI     LTG 4 Pt will improve DASH score to at least 20% perceived disability to show overall improved quality of life.  -SHARI     LTG 4 Progress New  -SHARI     LTG 5 Pt will improve B shoulder flexion to at least 155 deg, abduction to 150 deg, LAKISHA to T4, FIR to T12 to improve ability to reach overhead and behind her back.  -     LTG 5 Progress New  [facility]            Time Calculation    PT Goal Re-Cert Due Date 08/16/22  -           User Key  (r) = Recorded By, (t) = Taken By, (c) = Cosigned By    Initials Name Provider Type    Rosi Logan, PT Physical Therapist                 PT Assessment/Plan     Row Name 05/18/22 1500          PT Assessment    Functional Limitations Impaired locomotion;Limitation in home management;Limitations in community activities;Limitations in functional capacity and performance;Performance in leisure activities;Performance in self-care ADL  -SHARI     Impairments Impaired flexibility;Joint integrity;Joint mobility;Locomotion;Motor function;Muscle strength;Pain;Posture;Range of motion  -     Assessment Comments Madisyn Plascencia is a 81 y.o. female referred to physical therapy for B shoulder pain. She presents with a stable clinical presentation, along with   comorbidities of HTN, L knee OA (intermittent knee buckling) and hx R TKR (2011) and personal factors of multiple pain sites and use of SPC (R side) causing inc R shoulder pain that may impact her progress in the plan of care. Pt presents today with sitting posture of FH, rounded shoulders and mild thoracic kyphosis. Patient pin points B shoulder pain isolated of deltoid insertion and TTP over R supraspinatus. Ms. Plascencia demonstrates B shoulder ROM deficits (R>L), UE weakness and special test + for B Wu-Bebeto and Neer impingement test and + R empty can. Her signs and symptoms are consistent with a physical therapy diagnosis of B rotator cuff tendonitis. The previous impairments limit her ability to reach overhead and behind her back. Pt will benefit from skilled PT to address the previous impairments and return to PLOF.  -SHARI     Please refer to paper survey for additional self-reported information Yes  -SHARI     Rehab Potential Good  -SHARI     Patient/caregiver participated in establishment of treatment plan and goals Yes  -SHARI     Patient would benefit from skilled therapy intervention Yes  -SHARI            PT Plan    PT Frequency 2x/week  -SHARI     Predicted Duration of Therapy Intervention (PT) 8-10 visits  -SHARI     Planned CPT's? PT EVAL LOW COMPLEXITY: 68626;PT RE-EVAL: 43781;PT THER PROC EA 15 MIN: 29594;PT THER ACT EA 15 MIN: 29751;PT MANUAL THERAPY EA 15 MIN: 26243;PT NEUROMUSC RE-EDUCATION EA 15 MIN: 06510;PT SELF CARE/HOME MGMT/TRAIN EA 15: 44074;PT HOT OR COLD PACK TREAT MCARE  -SHARI     PT Plan Comments response to initial HEP, warm up on UBE, consider PROM manual, GHJ mobs, consider SL abd/flex/ER, B shoulder ER, row/ext. keep strength below 90 initially  -SHARI           User Key  (r) = Recorded By, (t) = Taken By, (c) = Cosigned By    Initials Name Provider Type    Rosi Logan, PT Physical Therapist                   OP Exercises     Row Name 05/18/22 1400             Subjective Comments     Subjective Comments eval  -SHARI              Total Minutes    71482 - PT Therapeutic Exercise Minutes 10  -SHARI              Exercise 1    Exercise Name 1 UBE  -SHARI      Additional Comments next visit  -SHARI              Exercise 2    Exercise Name 2 supine AAROM shoulder flexion  -SHARI      Cueing 2 Verbal;Demo  -SHARI      Sets 2 1  -SHARI      Reps 2 10  -SHARI      Time 2 5s  -SHARI      Additional Comments dowel  -SHARI              Exercise 3    Exercise Name 3 seated bwd shoulder rolls  -SHARI      Cueing 3 Verbal;Demo  -SHARI      Sets 3 1  -SHARI      Reps 3 20  -SHARI              Exercise 4    Exercise Name 4 seated scapular retraction  -SHARI      Cueing 4 Verbal;Demo  -SHARI      Sets 4 2  -SHARI      Reps 4 10  -SHARI      Time 4 3s  -SHARI              Exercise 5    Exercise Name 5 wall slides  -SHARI      Cueing 5 Verbal;Demo  -SHARI      Sets 5 1  -SHARI      Reps 5 10  -SHARI      Time 5 5s  -SHARI      Additional Comments flexion  -SHARI            User Key  (r) = Recorded By, (t) = Taken By, (c) = Cosigned By    Initials Name Provider Type    Rosi Logan, PT Physical Therapist                              Outcome Measure Options: Quick DASH  Quick DASH  Open a tight or new jar.: Mild Difficulty  Do heavy household chores (e.g., wash walls, wash floors): Moderate Difficulty  Carry a shopping bag or briefcase: Mild Difficulty  Wash your back: Moderate Difficulty  Use a knife to cut food: No Difficulty  Recreational activities in which you take some force or impact through your arm, should or hand (e.g. golf, hammering, tennis, etc.): Mild Difficulty  During the past week, to what extent has your arm, shoulder, or hand problem interfered with your normal social activites with family, friends, neighbors or groups?: Moderately  During the past week, were you limited in your work or other regular daily activities as a result of your arm, shoulder or hand problem?: Slightly Limited  Arm, Shoulder, or hand pain: Moderate  Tingling (pins and needles) in your  arm, shoulder, or hand: None  During the past week, how much difficulty have you had sleeping because of the pain in your arm, shoulder or hand?: Moderate Difficiculty  Number of Questions Answered: 11  Quick DASH Score: 31.82         Time Calculation:     Start Time: 1447  Stop Time: 1530  Time Calculation (min): 43 min  Timed Charges  60802 - PT Therapeutic Exercise Minutes: 10  72346 - PT Self Care/Mgmt Minutes: 5  Untimed Charges  PT Eval/Re-eval Minutes: 28  Total Minutes  Timed Charges Total Minutes: 15  Untimed Charges Total Minutes: 28   Total Minutes: 43     Therapy Charges for Today     Code Description Service Date Service Provider Modifiers Qty    11036810068 HC PT THER PROC EA 15 MIN 5/18/2022 Rosi Del Castillo, PT GP 1    [id number] HC PT EVAL LOW COMPLEXITY 2 5/18/2022 Rosi Del Castillo, PT GP 1          PT G-Codes  Outcome Measure Options: Quick DASH  Quick DASH Score: 31.82         Rosi Del Castillo, PT  5/18/2022

## 2022-05-26 ENCOUNTER — HOSPITAL ENCOUNTER (OUTPATIENT)
Dept: PHYSICAL THERAPY | Facility: HOSPITAL | Age: 82
Setting detail: THERAPIES SERIES
Discharge: HOME | End: 2022-05-26

## 2022-05-26 DIAGNOSIS — M75.81 TENDONITIS OF BOTH ROTATOR CUFFS: ICD-10-CM

## 2022-05-26 DIAGNOSIS — M25.511 CHRONIC PAIN OF BOTH SHOULDERS: Primary | ICD-10-CM

## 2022-05-26 DIAGNOSIS — G89.29 CHRONIC PAIN OF BOTH SHOULDERS: Primary | ICD-10-CM

## 2022-05-26 DIAGNOSIS — M75.82 TENDONITIS OF BOTH ROTATOR CUFFS: ICD-10-CM

## 2022-05-26 DIAGNOSIS — M25.512 CHRONIC PAIN OF BOTH SHOULDERS: Primary | ICD-10-CM

## 2022-05-26 PROCEDURE — 97140 MANUAL THERAPY 1/> REGIONS: CPT

## 2022-05-26 PROCEDURE — 97110 THERAPEUTIC EXERCISES: CPT

## 2022-05-26 NOTE — THERAPY TREATMENT NOTE
"    Outpatient Physical Therapy Ortho Treatment Note  [facility]     Patient Name: Madisyn Plascencia  : 1940  MRN: [id number]  Today's Date: 2022      Visit Date: 2022    Visit Dx:    ICD-10-CM ICD-9-CM   1. Chronic pain of both shoulders  M25.511 719.41    G89.29 338.29    M25.512    2. Tendonitis of both rotator cuffs  M75.81 726.10    M75.82        There is no problem list on file for this patient.       Past Medical History:   Diagnosis Date   • Arthritis    • C. difficile colitis    • Disease of thyroid gland    • GERD (gastroesophageal reflux disease)    • H/O uterine prolapse    • Hyperlipidemia    • Hypertension    • Hypothyroidism    • Osteopenia    • Scoliosis    • Shoulder injury         Past Surgical History:   Procedure Laterality Date   • CHOLECYSTECTOMY     • COLONOSCOPY     • ENDOSCOPY     • ENDOSCOPY N/A 2021    Procedure: ESOPHAGOGASTRODUODENOSCOPY WITH [address] [place] BRADFORD DILATION;  Surgeon: Tripp Pascal MD;  Location: [facility] ENDOSCOPY;  Service: Gastroenterology;  Laterality: N/A;  PRE- DYSPHAGIA  POST- ESOPHAGEAL RING   • JOINT REPLACEMENT     • KNEE SURGERY     • TONSILLECTOMY     • UTERINE FIBROID SURGERY      \"pesary\" device used to hold uterus in place due to prolapse                        PT Assessment/Plan     Row Name 22 1338          PT Assessment    Assessment Comments Pt returns for first follow up since evaluation reporting good tolerance to HEP and continued R > L shoulder pain. Good response to continued gentle strengthening and manual therapy today. Pt with some R shoulder pain more with position than exercise. Pt hesitant to attempt light weight for exercise but able to tolerate well. Focused on gentle strengthening below 90 deg with good tolerance.  -LB            PT Plan    PT Plan Comments Continue manual therapy for pain control. Consider SL flexion, shoulder extension, B ER.  -LB           User Key  (r) = Recorded By, (t) = Taken By, (c) = " Cosigned By    Initials Name Provider Type    Chrissy Ardon, PT Physical Therapist                   OP Exercises     Row Name 05/26/22 1200 05/26/22 1100          Subjective Comments    Subjective Comments I think I am doing a little better. I was up on a ladder yesterday putting a cabinet back on.  -LB --            Subjective Pain    Able to rate subjective pain? yes  -LB --     Pre-Treatment Pain Level 3  -LB --            Total Minutes    32600 - PT Therapeutic Exercise Minutes 30  -LB --     83704 - PT Manual Therapy Minutes -- 15  -LB            Exercise 1    Exercise Name 1 pulleys (pt fatiguged from walk to gym)  -LB --     Reps 1 15  -LB --     Time 1 scaption  -LB --     Additional Comments to grossly 90 deg  -LB --            Exercise 2    Exercise Name 2 shoulder roll  -LB --     Reps 2 15  -LB --            Exercise 3    Exercise Name 3 tband row  -LB --     Sets 3 2  -LB --     Reps 3 10  -LB --     Time 3 RTB  -LB --            Exercise 4    Exercise Name 4 supine AAROM flexion dowel  -LB --     Reps 4 10  -LB --     Additional Comments 174 deg BUE  -LB --            Exercise 5    Exercise Name 5 supine shoulder press  -LB --     Sets 5 2  -LB --     Reps 5 10  -LB --     Time 5 2#  -LB --            Exercise 6    Exercise Name 6 wall wash  -LB --     Reps 6 10  -LB --     Time 6 wider   -LB --            Exercise 7    Exercise Name 7 SL abduction  -LB --     Sets 7 2  -LB --     Reps 7 10  -LB --     Time 7 no weight  -LB --            Exercise 8    Exercise Name 8 SL ER  -LB --     Sets 8 2  -LB --     Reps 8 10  -LB --     Time 8 no weight  -LB --           User Key  (r) = Recorded By, (t) = Taken By, (c) = Cosigned By    Initials Name Provider Type    Chrissy Ardon PT Physical Therapist                         Manual Rx (last 36 hours)     Manual Treatments     Row Name 05/26/22 1100             Total Minutes    99836 - PT Manual Therapy Minutes 15  -LB              Manual Rx 1     Manual Rx 1 Location B shoulders  -LB      Manual Rx 1 Type PROM, gentle oscillations, posterior mobs  -LB      Manual Rx 1 Duration 15  -LB            User Key  (r) = Recorded By, (t) = Taken By, (c) = Cosigned By    Initials Name Provider Type    Chrissy Ardon PT Physical Therapist                 PT OP Goals     Row Name 05/26/22 1200          PT Short Term Goals    STG Date to Achieve 05/18/22  -LB     STG 1 Pt will be independent and verbalized good understanding of initial HEP to improve ability to manage pain, as well as improve strength and ROM.  -LB     STG 1 Progress Met  -LB     STG 2 Patient will report 50% reduction in B shoulder pain/ROM limitation when reaching overhead and behind her back to don jacket.  -LB     STG 2 Progress Ongoing  -LB            Long Term Goals    LTG Date to Achieve 07/17/22  -LB     LTG 1 Pt will be independent and verbalized good understanding of advanced HEP to improve ability to manage pain, as well as improve strength and ROM.  -LB     LTG 1 Progress New  -LB     LTG 2 Pt will report 3/10 pain at worst in B shoulder with overhead activities to improve participation in ADLs.  -LB     LTG 2 Progress New  -LB     LTG 3 Patient will improve ability to sleep through the night with 0 sleep disturbances related to shoulder pain to improve overall sleep quality and quality of life  -LB     LTG 3 Progress New  -LB     LTG 4 Pt will improve DASH score to at least 20% perceived disability to show overall improved quality of life.  -LB     LTG 4 Progress New  -LB     LTG 5 Pt will improve B shoulder flexion to at least 155 deg, abduction to 150 deg, LAKISHA to T4, FIR to T12 to improve ability to reach overhead and behind her back.  -LB     LTG 5 Progress New  -LB           User Key  (r) = Recorded By, (t) = Taken By, (c) = Cosigned By    Initials Name Provider Type    Chrissy Ardon PT Physical Therapist                Therapy Education  Education Details: reviewed HEP, discussed  bursitis etiology, postural contribution, scapular position  Given: HEP, Symptoms/condition management, Pain management, Posture/body mechanics  Program: Reinforced  How Provided: Verbal  Provided to: Patient  Level of Understanding: Teach back education performed, Verbalized              Time Calculation:   Start Time: 1230  Stop Time: 1315  Time Calculation (min): 45 min  Total Timed Code Minutes- PT: 43 minute(s)  Timed Charges  78638 - PT Therapeutic Exercise Minutes: 30  70751 - PT Manual Therapy Minutes: 15  Total Minutes  Timed Charges Total Minutes: 30   Total Minutes: 30  Therapy Charges for Today     Code Description Service Date Service Provider Modifiers Qty    05793960794 HC PT THER PROC EA 15 MIN 5/26/2022 Chrissy Chen, PT GP 2    83254796428 HC PT MANUAL THERAPY EA 15 MIN 5/26/2022 Chrissy Chen, PT GP 1                    Chrissy Chen, PT  5/26/2022

## 2022-06-14 ENCOUNTER — APPOINTMENT (OUTPATIENT)
Dept: PHYSICAL THERAPY | Facility: HOSPITAL | Age: 82
End: 2022-06-14

## 2022-06-16 ENCOUNTER — APPOINTMENT (OUTPATIENT)
Dept: PHYSICAL THERAPY | Facility: HOSPITAL | Age: 82
End: 2022-06-16

## 2022-06-16 ENCOUNTER — TELEPHONE (OUTPATIENT)
Dept: PHYSICAL THERAPY | Facility: HOSPITAL | Age: 82
End: 2022-06-16

## 2022-06-16 NOTE — TELEPHONE ENCOUNTER
Spoke with patient regardingcancellation for today's appointment. Patient contacted  informing she was no feeling well due to fatigue and feeling shaky and had to quickly lay down. Discussed with patient her current symptoms and reviewed s/s of CVA vs MI vs low blood sugar. Patient denies headache, doubled/blurred vision, slurred speech, chest/upper back pain, or light-headedness. Patient reports her symptoms have improved since laying down and verbalizes understanding of s/s to monitor. Encouraged patient to contact PCP if mild symptoms remain and to call EMS if symptoms worsen. Patient verbalizes understanding. Reminded pt of next appointment date and time, as well as reminded of 24 hour cancellation policy. Requested pt call back if unable to make next appointment.

## 2022-06-20 ENCOUNTER — HOSPITAL ENCOUNTER (OUTPATIENT)
Dept: PHYSICAL THERAPY | Facility: HOSPITAL | Age: 82
Setting detail: THERAPIES SERIES
Discharge: HOME | End: 2022-06-20

## 2022-06-20 DIAGNOSIS — M75.82 TENDONITIS OF BOTH ROTATOR CUFFS: ICD-10-CM

## 2022-06-20 DIAGNOSIS — G89.29 CHRONIC PAIN OF BOTH SHOULDERS: Primary | ICD-10-CM

## 2022-06-20 DIAGNOSIS — M25.511 CHRONIC PAIN OF BOTH SHOULDERS: Primary | ICD-10-CM

## 2022-06-20 DIAGNOSIS — M25.512 CHRONIC PAIN OF BOTH SHOULDERS: Primary | ICD-10-CM

## 2022-06-20 DIAGNOSIS — M75.81 TENDONITIS OF BOTH ROTATOR CUFFS: ICD-10-CM

## 2022-06-20 PROCEDURE — 97140 MANUAL THERAPY 1/> REGIONS: CPT

## 2022-06-20 PROCEDURE — 97110 THERAPEUTIC EXERCISES: CPT

## 2022-06-20 NOTE — THERAPY PROGRESS REPORT/RE-CERT
"    Outpatient Physical Therapy Ortho Progress Note  [facility]     Patient Name: Madisyn Plascencia  : 1940  MRN: [id number]  Today's Date: 2022      Visit Date: 2022    Visit Dx:    ICD-10-CM ICD-9-CM   1. Chronic pain of both shoulders  M25.511 719.41    G89.29 338.29    M25.512    2. Tendonitis of both rotator cuffs  M75.81 726.10    M75.82        There is no problem list on file for this patient.       Past Medical History:   Diagnosis Date   • Arthritis    • C. difficile colitis    • Disease of thyroid gland    • GERD (gastroesophageal reflux disease)    • H/O uterine prolapse    • Hyperlipidemia    • Hypertension    • Hypothyroidism    • Osteopenia    • Scoliosis    • Shoulder injury         Past Surgical History:   Procedure Laterality Date   • CHOLECYSTECTOMY     • COLONOSCOPY     • ENDOSCOPY     • ENDOSCOPY N/A 2021    Procedure: ESOPHAGOGASTRODUODENOSCOPY WITH [address] [place] BR[address] DILATION;  Surgeon: Tripp Pascal MD;  Location: [facility] ENDOSCOPY;  Service: Gastroenterology;  Laterality: N/A;  PRE- DYSPHAGIA  POST- ESOPHAGEAL RING   • JOINT REPLACEMENT     • KNEE SURGERY     • TONSILLECTOMY     • UTERINE FIBROID SURGERY      \"pesary\" device used to hold uterus in place due to prolapse        PT Ortho     Row Name 22 1100       Subjective Comments    Subjective Comments Pt reports she is feeling weak today/the past few days on and off, denies any chest pain, numbness, unilateral weakness. Feels better when in supine.  -RS       Vital Signs    Pre Systolic BP Rehab 128  -RS    Pre Treatment Diastolic BP 83  -RS    Intra Systolic BP Rehab 132  -RS    Intra Treatment Diastolic BP 82  -RS    Pretreatment Heart Rate (beats/min) 81  -RS       Right Upper Ext    Rt Shoulder Flexion AROM 0-142  -RS    Rt Shoulder External Rotation AROM LAKISHA C7 with pain  -RS    Rt Shoulder Internal Rotation AROM FIR L5 with pain  -RS       Left Upper Ext    Lt Shoulder Flexion AROM 0-138  -RS    Lt " Shoulder External Rotation AROM LAKISHA T1 with pain  -RS    Lt Shoulder Internal Rotation AROM FIR L3 with pain  -RS          User Key  (r) = Recorded By, (t) = Taken By, (c) = Cosigned By    Initials Name Provider Type    RS Tesha Perez PT Physical Therapist                             PT Assessment/Plan     Row Name 06/20/22 1200          PT Assessment    Functional Limitations Impaired locomotion;Limitation in home management;Limitations in community activities;Limitations in functional capacity and performance;Performance in leisure activities;Performance in self-care ADL  -RS     Impairments Impaired flexibility;Joint integrity;Joint mobility;Locomotion;Motor function;Muscle strength;Pain;Posture;Range of motion  -RS     Assessment Comments The pt has been seen by PT for 3 total sessions focused on B shoulder pain. She reports little to no progress toward functional goals to date, compliance with schedule somewhat limited by pt reports of generalized weakness and fatigue. Pt's vitals were WNL throughout session. She demonstrates no significant changes in AROM B shoulders compared to initial eval, reports good compliance with current HEP. Pt has met 1/2 STG and 0/5 LTG. Progression of therex included shoulder extension, limited further due to pt reports of fatigue and overall weakness. Reviewed current HEP and encouraged pt to contact MD regarding fatigue that has been present recently. She remains appropriate for skilled PT to address B shoulder pain, strength, and mobility limitations.  -RS     Please refer to paper survey for additional self-reported information Yes  -RS     Rehab Potential Good  -RS     Patient/caregiver participated in establishment of treatment plan and goals Yes  -RS     Patient would benefit from skilled therapy intervention Yes  -RS            PT Plan    PT Plan Comments Follow up regarding symptoms (fatigue/weakness), continue to focus on scap strength, consider sl flex, 1# weight  with ER/AB if tolerance allows  -RS           User Key  (r) = Recorded By, (t) = Taken By, (c) = Cosigned By    Initials Name Provider Type    RS Tesha Perez PT Physical Therapist                   OP Exercises     Row Name 06/20/22 1100             Subjective Comments    Subjective Comments Pt reports she is feeling weak today/the past few days on and off, denies any chest pain, numbness, unilateral weakness. Feels better when in supine.  -RS              Subjective Pain    Able to rate subjective pain? yes  -RS      Subjective Pain Comment mod ache  -RS              Total Minutes    10582 - PT Therapeutic Exercise Minutes 28  -RS      93352 - PT Manual Therapy Minutes 14  -RS              Exercise 1    Exercise Name 1 update of goals/objective  -RS      Reps 1 --  -RS      Time 1 --  -RS              Exercise 2    Exercise Name 2 shoulder roll  -RS      Reps 2 15  -RS              Exercise 3    Exercise Name 3 tband row  -RS      Sets 3 2  -RS      Reps 3 10  -RS      Time 3 RTB  -RS      Additional Comments --  -RS              Exercise 4    Exercise Name 4 supine AAROM flexion dowel  -RS      Reps 4 10  -RS      Additional Comments 0-168  -RS              Exercise 5    Exercise Name 5 supine shoulder press  -RS      Sets 5 1  -RS      Reps 5 10  -RS      Time 5 2#  -RS              Exercise 6    Exercise Name 6 wall wash  -RS      Reps 6 10  -RS      Time 6 wider   -RS              Exercise 7    Exercise Name 7 SL abduction  -RS      Sets 7 2  -RS      Reps 7 10  -RS      Time 7 no weight  -RS              Exercise 8    Exercise Name 8 SL ER  -RS      Sets 8 2  -RS      Reps 8 10  -RS      Time 8 no weight  -RS              Exercise 9    Exercise Name 9 shoulder extension  -RS      Cueing 9 Verbal;Demo  -RS      Sets 9 2  -RS      Reps 9 10  -RS      Time 9 RTB  -RS            User Key  (r) = Recorded By, (t) = Taken By, (c) = Cosigned By    Initials Name Provider Type    RS Tesha Perez PT  Physical Therapist                         Manual Rx (last 36 hours)     Manual Treatments     Row Name 06/20/22 1100             Total Minutes    47424 - PT Manual Therapy Minutes 14  -RS              Manual Rx 1    Manual Rx 1 Location B shoulders  -RS      Manual Rx 1 Type PROM, gentle oscillations, posterior mobs  -RS      Manual Rx 1 Duration 15  -RS            User Key  (r) = Recorded By, (t) = Taken By, (c) = Cosigned By    Initials Name Provider Type    RS Tesha Perez, PT Physical Therapist                 PT OP Goals     Row Name 06/20/22 1100          PT Short Term Goals    STG Date to Achieve 05/18/22  -RS     STG 1 Pt will be independent and verbalized good understanding of initial HEP to improve ability to manage pain, as well as improve strength and ROM.  -RS     STG 1 Progress Met  -RS     STG 2 Patient will report 50% reduction in B shoulder pain/ROM limitation when reaching overhead and behind her back to don jacket.  -RS     STG 2 Progress Ongoing  -RS     STG 2 Progress Comments pt reports no change  -RS            Long Term Goals    LTG Date to Achieve 07/17/22  -RS     LTG 1 Pt will be independent and verbalized good understanding of advanced HEP to improve ability to manage pain, as well as improve strength and ROM.  -RS     LTG 1 Progress Ongoing  -RS     LTG 1 Progress Comments reviewed HEP with pt this date  -RS     LTG 2 Pt will report 3/10 pain at worst in B shoulder with overhead activities to improve participation in ADLs.  -RS     LTG 2 Progress Ongoing  -RS     LTG 2 Progress Comments reports ache 8/10 at highest  -RS     LTG 3 Patient will improve ability to sleep through the night with 0 sleep disturbances related to shoulder pain to improve overall sleep quality and quality of life  -RS     LTG 3 Progress Ongoing  -RS     LTG 3 Progress Comments pt reports she wakes many times in the night to use restroom but is also in pain, difficulty sleeping  -RS     LTG 4 Pt will improve  DASH score to at least 20% perceived disability to show overall improved quality of life.  -RS     LTG 4 Progress Ongoing  -RS     LTG 4 Progress Comments 43% (compared to 31 at eval)  -RS     LTG 5 Pt will improve B shoulder flexion to at least 155 deg, abduction to 150 deg, LAKISHA to T4, FIR to T12 to improve ability to reach overhead and behind her back.  -RS     LTG 5 Progress Ongoing  -RS     LTG 5 Progress Comments see objective  -RS           User Key  (r) = Recorded By, (t) = Taken By, (c) = Cosigned By    Initials Name Provider Type    Tesha Murdock PT Physical Therapist                Therapy Education  Education Details: education regarding symptoms/when to notify MD/ED if needed  Given: HEP  Program: Reinforced  How Provided: Verbal, Demonstration, Written  Provided to: Patient  Level of Understanding: Verbalized, Demonstrated    Outcome Measure Options: Quick DASH  Quick DASH  Open a tight or new jar.: Moderate Difficulty  Do heavy household chores (e.g., wash walls, wash floors): Moderate Difficulty  Carry a shopping bag or briefcase: Moderate Difficulty  Wash your back: Severe Difficulty  Use a knife to cut food: No Difficulty  Recreational activities in which you take some force or impact through your arm, should or hand (e.g. golf, hammering, tennis, etc.): Moderate Difficulty  During the past week, to what extent has your arm, shoulder, or hand problem interfered with your normal social activites with family, friends, neighbors or groups?: Moderately  During the past week, were you limited in your work or other regular daily activities as a result of your arm, shoulder or hand problem?: Moderately Limited  Arm, Shoulder, or hand pain: Moderate  During the past week, how much difficulty have you had sleeping because of the pain in your arm, shoulder or hand?: Severe Difficulty  Number of Questions Answered: 11  Quick DASH Score: 43.18         Time Calculation:   Start Time: 1130  Stop Time:  1217  Time Calculation (min): 47 min  Timed Charges  42444 - PT Therapeutic Exercise Minutes: 28  21156 - PT Manual Therapy Minutes: 14  Total Minutes  Timed Charges Total Minutes: 42   Total Minutes: 42  Therapy Charges for Today     Code Description Service Date Service Provider Modifiers Qty    28115545964 HC PT THER PROC EA 15 MIN 6/20/2022 Tesha Perez, PT GP 2    53362475225 HC PT MANUAL THERAPY EA 15 MIN 6/20/2022 Tesha Perez, PT GP 1          PT G-Codes  Outcome Measure Options: Quick DASH  Quick DASH Score: 43.18         Tesha Perez PT  6/20/2022

## 2022-06-23 ENCOUNTER — HOSPITAL ENCOUNTER (OUTPATIENT)
Dept: PHYSICAL THERAPY | Facility: HOSPITAL | Age: 82
Setting detail: THERAPIES SERIES
Discharge: HOME | End: 2022-06-23

## 2022-06-23 DIAGNOSIS — G89.29 CHRONIC PAIN OF BOTH SHOULDERS: ICD-10-CM

## 2022-06-23 DIAGNOSIS — M75.82 TENDONITIS OF BOTH ROTATOR CUFFS: Primary | ICD-10-CM

## 2022-06-23 DIAGNOSIS — M75.81 TENDONITIS OF BOTH ROTATOR CUFFS: Primary | ICD-10-CM

## 2022-06-23 DIAGNOSIS — M25.512 CHRONIC PAIN OF BOTH SHOULDERS: ICD-10-CM

## 2022-06-23 DIAGNOSIS — M25.511 CHRONIC PAIN OF BOTH SHOULDERS: ICD-10-CM

## 2022-06-23 PROCEDURE — 97140 MANUAL THERAPY 1/> REGIONS: CPT

## 2022-06-23 PROCEDURE — 97110 THERAPEUTIC EXERCISES: CPT

## 2022-06-24 NOTE — THERAPY TREATMENT NOTE
"    Outpatient Physical Therapy Ortho Treatment Note  [facility]     Patient Name: Madisyn Plascencia  : 1940  MRN: [id number]  Today's Date: 2022      Visit Date: 2022    Visit Dx:    ICD-10-CM ICD-9-CM   1. Tendonitis of both rotator cuffs  M75.81 726.10    M75.82    2. Chronic pain of both shoulders  M25.511 719.41    G89.29 338.29    M25.512        There is no problem list on file for this patient.       Past Medical History:   Diagnosis Date   • Arthritis    • C. difficile colitis    • Disease of thyroid gland    • GERD (gastroesophageal reflux disease)    • H/O uterine prolapse    • Hyperlipidemia    • Hypertension    • Hypothyroidism    • Osteopenia    • Scoliosis    • Shoulder injury         Past Surgical History:   Procedure Laterality Date   • CHOLECYSTECTOMY     • COLONOSCOPY     • ENDOSCOPY     • ENDOSCOPY N/A 2021    Procedure: ESOPHAGOGASTRODUODENOSCOPY WITH [address] [place] BRADFORD DILATION;  Surgeon: Tripp Pascal MD;  Location: [facility] ENDOSCOPY;  Service: Gastroenterology;  Laterality: N/A;  PRE- DYSPHAGIA  POST- ESOPHAGEAL RING   • JOINT REPLACEMENT     • KNEE SURGERY     • TONSILLECTOMY     • UTERINE FIBROID SURGERY      \"pesary\" device used to hold uterus in place due to prolapse                        PT Assessment/Plan     Row Name 22 0836          PT Assessment    Assessment Comments Pt enters clinic today and when entering lobby demonstrated inc respiratory rate and slight distress over being late for appt. Pt O2 saturation and HR were recorded in lobby at 99% O2 saturation and 98 bpm despite obvious inc RR. Spoke with pt and encouraged controlled diaphragmatic breathing with calming tones. Pt able to converse easily and reports she was worked up on her way here due to possibly being late. She states this is happening at an increasing frequency with multiple occurences over last few weeks but has been an ongoing problem for 2 years. She states she will be fine and " that she is dealing with it. She was easily calmed after several minutes of discussion. BP was normal at 126/78. Pt was able to proceed with full PT session. Inc education recommending pt sees PCP and discusses symptoms. Symptoms appear to be related to anxiety but pt would benefit from full evaluation and assessment by PCP or other specialist. B shoulder pain continues to dec overall but does fluctuate with certain movements especially ER. She reports dec symptoms with PT sessions. She is improving her tolerance to strengthening and we were able to add weight to several exercises today. HEP was progressed appropriately.  -LB            PT Plan    PT Plan Comments follow up regarding PCP appt.? continue to progress strengthening as able. Progress to standing if pt can tolerate.  -LB           User Key  (r) = Recorded By, (t) = Taken By, (c) = Cosigned By    Initials Name Provider Type    Chrissy Ardon, PT Physical Therapist                   OP Exercises     Row Name 06/24/22 0800 06/24/22 0700          Subjective Comments    Subjective Comments I am going to be fine. I just got worked up on the way here bc I thought I was going to be late. It has happened more frequently in the last few weeks. I can talk myself out of it but I get very short of air and I get tired.  -LB --            Subjective Pain    Able to rate subjective pain? yes  -LB --     Pre-Treatment Pain Level 3  -LB --     Subjective Pain Comment Some days my shoulders hurt and some days they don't. Yesterday was a good day. Today they feel pretty good.  -LB --            Total Minutes    67027 - PT Therapeutic Exercise Minutes 30  -LB --     34014 - PT Manual Therapy Minutes -- 15  -LB            Exercise 1    Exercise Name 1 shoulder press  -LB --     Sets 1 2  -LB --     Reps 1 10  -LB --     Time 1 2#  -LB --            Exercise 2    Exercise Name 2 shoulder roll  -LB --     Reps 2 15  -LB --            Exercise 3    Exercise Name 3 tband row   -LB --     Sets 3 2  -LB --     Reps 3 10  -LB --     Time 3 RTB  -LB --            Exercise 4    Exercise Name 4 supine AAROM flexion dowel  -LB --     Reps 4 10  -LB --            Exercise 5    Exercise Name 5 supine HA  -LB --     Sets 5 2  -LB --     Reps 5 10  -LB --     Time 5 RTB  -LB --            Exercise 6    Exercise Name 6 wall wash  -LB --     Reps 6 10  -LB --     Time 6 wider   -LB --            Exercise 7    Exercise Name 7 SL abduction  -LB --     Sets 7 2  -LB --     Reps 7 10  -LB --     Time 7 1#  -LB --            Exercise 8    Exercise Name 8 SL ER  -LB --     Sets 8 2  -LB --     Reps 8 10  -LB --     Time 8 1#  -LB --            Exercise 9    Exercise Name 9 shoulder extension  -LB --     Cueing 9 Verbal;Demo  -LB --     Sets 9 2  -LB --     Reps 9 10  -LB --     Time 9 RTB  -LB --            Exercise 10    Exercise Name 10 supine B ER  -LB --     Sets 10 2  -LB --     Reps 10 10  -LB --     Time 10 RTB  -LB --           User Key  (r) = Recorded By, (t) = Taken By, (c) = Cosigned By    Initials Name Provider Type    Chrissy Ardon, PT Physical Therapist                         Manual Rx (last 36 hours)     Manual Treatments     Row Name 06/24/22 0700             Total Minutes    53525 - PT Manual Therapy Minutes 15  -LB              Manual Rx 1    Manual Rx 1 Location B shoulders  -LB      Manual Rx 1 Type PROM, gentle oscillations, posterior mobs  -LB      Manual Rx 1 Duration 15  -LB            User Key  (r) = Recorded By, (t) = Taken By, (c) = Cosigned By    Initials Name Provider Type    Chrissy Ardon, PT Physical Therapist                 PT OP Goals     Row Name 06/24/22 0800          PT Short Term Goals    STG Date to Achieve 05/18/22  -LB     STG 1 Pt will be independent and verbalized good understanding of initial HEP to improve ability to manage pain, as well as improve strength and ROM.  -LB     STG 1 Progress Met  -LB     STG 2 Patient will report 50% reduction in B  shoulder pain/ROM limitation when reaching overhead and behind her back to don jacket.  -LB     STG 2 Progress Ongoing  -LB            Long Term Goals    LTG Date to Achieve 07/17/22  -LB     LTG 1 Pt will be independent and verbalized good understanding of advanced HEP to improve ability to manage pain, as well as improve strength and ROM.  -LB     LTG 1 Progress Ongoing  -LB     LTG 2 Pt will report 3/10 pain at worst in B shoulder with overhead activities to improve participation in ADLs.  -LB     LTG 2 Progress Ongoing  -LB     LTG 3 Patient will improve ability to sleep through the night with 0 sleep disturbances related to shoulder pain to improve overall sleep quality and quality of life  -LB     LTG 3 Progress Ongoing  -LB     LTG 4 Pt will improve DASH score to at least 20% perceived disability to show overall improved quality of life.  -LB     LTG 4 Progress Ongoing  -LB     LTG 5 Pt will improve B shoulder flexion to at least 155 deg, abduction to 150 deg, LAKISHA to T4, FIR to T12 to improve ability to reach overhead and behind her back.  -LB     LTG 5 Progress Ongoing  -LB           User Key  (r) = Recorded By, (t) = Taken By, (c) = Cosigned By    Initials Name Provider Type    Chrissy Ardon, PT Physical Therapist                Therapy Education  Education Details: discussed alerting PCP concerning symptoms, reviewed and progressed HEP  Given: HEP, Symptoms/condition management, Mobility training  Program: Reinforced, Progressed  How Provided: Verbal, Demonstration, Written  Provided to: Patient  Level of Understanding: Verbalized, Demonstrated, Teach back education performed              Time Calculation:   Start Time: 1230  Stop Time: 1315  Time Calculation (min): 45 min  Total Timed Code Minutes- PT: 43 minute(s)  Timed Charges  40061 - PT Therapeutic Exercise Minutes: 30  86498 - PT Manual Therapy Minutes: 15  Total Minutes  Timed Charges Total Minutes: 30   Total Minutes: 30  Therapy Charges for  Today     Code Description Service Date Service Provider Modifiers Qty    02968697355 HC PT MANUAL THERAPY EA 15 MIN 6/23/2022 Chrissy Chen, PT GP 1    13598141726 HC PT THER PROC EA 15 MIN 6/23/2022 Chrissy Chen, PT GP 2                    Chrissy Chen, PT  6/24/2022

## 2022-06-28 ENCOUNTER — TELEPHONE (OUTPATIENT)
Dept: SPORTS MEDICINE | Facility: CLINIC | Age: 82
End: 2022-06-28

## 2022-06-28 ENCOUNTER — HOSPITAL ENCOUNTER (OUTPATIENT)
Dept: PHYSICAL THERAPY | Facility: HOSPITAL | Age: 82
Setting detail: THERAPIES SERIES
Discharge: HOME | End: 2022-06-28

## 2022-06-28 DIAGNOSIS — M25.511 CHRONIC PAIN OF BOTH SHOULDERS: ICD-10-CM

## 2022-06-28 DIAGNOSIS — M75.81 TENDONITIS OF BOTH ROTATOR CUFFS: Primary | ICD-10-CM

## 2022-06-28 DIAGNOSIS — G89.29 CHRONIC PAIN OF BOTH SHOULDERS: ICD-10-CM

## 2022-06-28 DIAGNOSIS — M25.512 CHRONIC PAIN OF BOTH SHOULDERS: ICD-10-CM

## 2022-06-28 DIAGNOSIS — M75.82 TENDONITIS OF BOTH ROTATOR CUFFS: Primary | ICD-10-CM

## 2022-06-28 PROCEDURE — 97110 THERAPEUTIC EXERCISES: CPT

## 2022-06-28 PROCEDURE — 97140 MANUAL THERAPY 1/> REGIONS: CPT

## 2022-06-28 NOTE — TELEPHONE ENCOUNTER
Caller: PATIENT     Relationship to patient: SELF    Best call back number: [phone]    Patient is needing: PATIENT WOULD LIKE TO CANCEL HER RIGHT SHOULDER INJECTION FOR 6-30-22. SHE SAYS PHYSICAL THERAPY HAS HELPED AND SHE IS MUCH BETTER.

## 2022-06-28 NOTE — THERAPY TREATMENT NOTE
"    Outpatient Physical Therapy Ortho Treatment Note  [facility]     Patient Name: Madisyn Plascencia  : 1940  MRN: [id number]  Today's Date: 2022      Visit Date: 2022    Visit Dx:    ICD-10-CM ICD-9-CM   1. Tendonitis of both rotator cuffs  M75.81 726.10    M75.82    2. Chronic pain of both shoulders  M25.511 719.41    G89.29 338.29    M25.512        There is no problem list on file for this patient.       Past Medical History:   Diagnosis Date   • Arthritis    • C. difficile colitis    • Disease of thyroid gland    • GERD (gastroesophageal reflux disease)    • H/O uterine prolapse    • Hyperlipidemia    • Hypertension    • Hypothyroidism    • Osteopenia    • Scoliosis    • Shoulder injury         Past Surgical History:   Procedure Laterality Date   • CHOLECYSTECTOMY     • COLONOSCOPY     • ENDOSCOPY     • ENDOSCOPY N/A 2021    Procedure: ESOPHAGOGASTRODUODENOSCOPY WITH [address] [place] BR[address]FORD DILATION;  Surgeon: Tripp Pascal MD;  Location: [facility] ENDOSCOPY;  Service: Gastroenterology;  Laterality: N/A;  PRE- DYSPHAGIA  POST- ESOPHAGEAL RING   • JOINT REPLACEMENT     • KNEE SURGERY     • TONSILLECTOMY     • UTERINE FIBROID SURGERY      \"pesary\" device used to hold uterus in place due to prolapse                        PT Assessment/Plan     Row Name 22 1400          PT Assessment    Assessment Comments Ms. Plascencia returns to PT with reports of returning to baseline and feels her anxiety is under control. Patient denies seeking MD follow up despite recommendation last session. Patient states she went to [employer] yesterday and participated in gentle workout and feels its directly related to her decreased pain. Patient continues to report overall reduction in B shoulder pain and wishes to no longer proceed with upcoming scheduled injection. Due to mild confusion with HEP, extended time spent reviewing all HEP interventions and addressing all questions/concerns. Provided updated print out and " patient verbalizes understanding. Initiated STM to B latissimus dorsi/subscapularis leading to mild improvement in shoulder flexion ROM. Also added around [place] ball pass as patient reports increased difficulty when reaching behind her back. Ms. Plascencia remains a good candidate for skilled PT.  -SHARI            PT Plan    PT Plan Comments how did HEP go? consider ball roll ups  -SHARI           User Key  (r) = Recorded By, (t) = Taken By, (c) = Cosigned By    Initials Name Provider Type    Rosi Logan, PT Physical Therapist                   OP Exercises     Row Name 06/28/22 1300             Subjective Comments    Subjective Comments I am feeling better now. I was able to go to the pool and do some exercises and I really feel like that made me feel better  -SHARI              Subjective Pain    Able to rate subjective pain? yes  -SHARI      Pre-Treatment Pain Level 0  -SHARI              Total Minutes    24045 - PT Therapeutic Exercise Minutes 30  -SHARI      87903 - PT Manual Therapy Minutes 15  -SHARI              Exercise 1    Exercise Name 1 shoulder press  -SHARI      Sets 1 2  -SHARI      Reps 1 10  -SHARI      Time 1 2#  -SHARI              Exercise 2    Exercise Name 2 shoulder roll  -SHARI      Reps 2 15  -SHARI              Exercise 3    Exercise Name 3 tband row  -SHARI      Sets 3 2  -SHARI      Reps 3 10  -SHARI      Time 3 RTB  -SHARI              Exercise 4    Exercise Name 4 supine AAROM flexion dowel  -SHARI      Reps 4 10  -SHARI              Exercise 5    Exercise Name 5 supine HA  -SHARI      Sets 5 2  -SHARI      Reps 5 10  -SHARI      Time 5 RTB  -SHARI              Exercise 6    Exercise Name 6 wall wash  -SHARI      Additional Comments reviewed for HEP  -SHARI              Exercise 7    Exercise Name 7 SL abduction  -SHARI      Sets 7 2  -SHARI      Reps 7 10  -SHARI      Time 7 1#  -SHARI              Exercise 8    Exercise Name 8 SL ER  -SHARI      Sets 8 2  -SHARI      Reps 8 10  -SHARI      Time 8 1#  -SHARI              Exercise 9    Exercise Name 9 shoulder  extension  -SHARI      Cueing 9 Verbal;Demo  -SHARI      Sets 9 2  -SHARI      Reps 9 10  -SHARI      Time 9 RTB  -SHARI              Exercise 10    Exercise Name 10 supine B ER  -SHARI      Sets 10 2  -SHARI      Reps 10 10  -SHARI      Time 10 RTB  -SHARI              Exercise 11    Exercise Name 11 around the [place] ball pass  -SHARI      Cueing 11 Verbal;Demo  -SHARI      Sets 11 1  -SHARI      Reps 11 10e  -SHARI      Time 11 pink ball  -SHARI            User Key  (r) = Recorded By, (t) = Taken By, (c) = Cosigned By    Initials Name Provider Type    Rosi Logan, PT Physical Therapist                         Manual Rx (last 36 hours)     Manual Treatments     Row Name 06/28/22 1300             Total Minutes    84767 - PT Manual Therapy Minutes 15  -SHARI              Manual Rx 1    Manual Rx 1 Location B shoulders  -SHARI      Manual Rx 1 Type PROM, gentle oscillations, posterior mobs  -SHARI              Manual Rx 2    Manual Rx 2 Location STM B lat/subscap  -SHARI            User Key  (r) = Recorded By, (t) = Taken By, (c) = Cosigned By    Initials Name Provider Type    Rosi Logan, PT Physical Therapist                 PT OP Goals     Row Name 06/28/22 1300          PT Short Term Goals    STG Date to Achieve 05/18/22  -SHARI     STG 1 Pt will be independent and verbalized good understanding of initial HEP to improve ability to manage pain, as well as improve strength and ROM.  -SHARI     STG 1 Progress Met  -     STG 2 Patient will report 50% reduction in B shoulder pain/ROM limitation when reaching overhead and behind her back to don jacket.  -     STG 2 Progress Ongoing  -            Long Term Goals    LTG Date to Achieve 07/17/22  -     LTG 1 Pt will be independent and verbalized good understanding of advanced HEP to improve ability to manage pain, as well as improve strength and ROM.  -SHARI     LTG 1 Progress Ongoing  -     LTG 2 Pt will report 3/10 pain at worst in B shoulder with overhead activities to improve participation  in ADLs.  -SHARI     LTG 2 Progress Ongoing  -HSARI     LTG 3 Patient will improve ability to sleep through the night with 0 sleep disturbances related to shoulder pain to improve overall sleep quality and quality of life  -SHARI     LTG 3 Progress Ongoing  -SHARI     LTG 4 Pt will improve DASH score to at least 20% perceived disability to show overall improved quality of life.  -SHARI     LTG 4 Progress Ongoing  -SHARI     LTG 5 Pt will improve B shoulder flexion to at least 155 deg, abduction to 150 deg, LAKISHA to T4, FIR to T12 to improve ability to reach overhead and behind her back.  -SHARI     LTG 5 Progress Ongoing  -SHARI           User Key  (r) = Recorded By, (t) = Taken By, (c) = Cosigned By    Initials Name Provider Type    Rosi Logan, PT Physical Therapist                Therapy Education  Education Details: updated HEP, reviewed all interventions in detail and addressed all questions  Given: HEP, Symptoms/condition management  Program: Reinforced, Progressed  How Provided: Verbal, Demonstration, Written  Provided to: Patient  Level of Understanding: Verbalized, Demonstrated              Time Calculation:   Start Time: 1305  Stop Time: 1350  Time Calculation (min): 45 min  Timed Charges  07630 - PT Therapeutic Exercise Minutes: 30  99166 - PT Manual Therapy Minutes: 15  Total Minutes  Timed Charges Total Minutes: 45   Total Minutes: 45  Therapy Charges for Today     Code Description Service Date Service Provider Modifiers Qty    10824601481  PT THER PROC EA 15 MIN 6/28/2022 Rosi Del Castillo, PT GP 2    [id number]  PT MANUAL THERAPY EA 15 MIN 6/28/2022 Rosi Del Castillo, SERA GP 1                    Rosi Del Castillo PT  6/28/2022

## 2022-06-30 ENCOUNTER — HOSPITAL ENCOUNTER (OUTPATIENT)
Dept: PHYSICAL THERAPY | Facility: HOSPITAL | Age: 82
Setting detail: THERAPIES SERIES
Discharge: HOME | End: 2022-06-30

## 2022-06-30 DIAGNOSIS — M25.512 CHRONIC PAIN OF BOTH SHOULDERS: ICD-10-CM

## 2022-06-30 DIAGNOSIS — M25.511 CHRONIC PAIN OF BOTH SHOULDERS: ICD-10-CM

## 2022-06-30 DIAGNOSIS — M75.82 TENDONITIS OF BOTH ROTATOR CUFFS: Primary | ICD-10-CM

## 2022-06-30 DIAGNOSIS — M75.81 TENDONITIS OF BOTH ROTATOR CUFFS: Primary | ICD-10-CM

## 2022-06-30 DIAGNOSIS — G89.29 CHRONIC PAIN OF BOTH SHOULDERS: ICD-10-CM

## 2022-06-30 PROCEDURE — 97140 MANUAL THERAPY 1/> REGIONS: CPT

## 2022-06-30 PROCEDURE — 97110 THERAPEUTIC EXERCISES: CPT

## 2022-07-05 ENCOUNTER — HOSPITAL ENCOUNTER (OUTPATIENT)
Dept: PHYSICAL THERAPY | Facility: HOSPITAL | Age: 82
Setting detail: THERAPIES SERIES
Discharge: HOME | End: 2022-07-05

## 2022-07-05 DIAGNOSIS — M75.81 TENDONITIS OF BOTH ROTATOR CUFFS: Primary | ICD-10-CM

## 2022-07-05 DIAGNOSIS — G89.29 CHRONIC PAIN OF BOTH SHOULDERS: ICD-10-CM

## 2022-07-05 DIAGNOSIS — M75.82 TENDONITIS OF BOTH ROTATOR CUFFS: Primary | ICD-10-CM

## 2022-07-05 DIAGNOSIS — M25.512 CHRONIC PAIN OF BOTH SHOULDERS: ICD-10-CM

## 2022-07-05 DIAGNOSIS — M25.511 CHRONIC PAIN OF BOTH SHOULDERS: ICD-10-CM

## 2022-07-05 PROCEDURE — 97140 MANUAL THERAPY 1/> REGIONS: CPT

## 2022-07-05 PROCEDURE — 97110 THERAPEUTIC EXERCISES: CPT

## 2022-07-05 NOTE — THERAPY TREATMENT NOTE
"    Outpatient Physical Therapy Ortho Treatment Note  [facility]     Patient Name: Madisyn Plascencia  : 1940  MRN: [id number]  Today's Date: 2022      Visit Date: 2022    Visit Dx:    ICD-10-CM ICD-9-CM   1. Tendonitis of both rotator cuffs  M75.81 726.10    M75.82    2. Chronic pain of both shoulders  M25.511 719.41    G89.29 338.29    M25.512        There is no problem list on file for this patient.       Past Medical History:   Diagnosis Date   • Arthritis    • C. difficile colitis    • Disease of thyroid gland    • GERD (gastroesophageal reflux disease)    • H/O uterine prolapse    • Hyperlipidemia    • Hypertension    • Hypothyroidism    • Osteopenia    • Scoliosis    • Shoulder injury         Past Surgical History:   Procedure Laterality Date   • CHOLECYSTECTOMY     • COLONOSCOPY     • ENDOSCOPY     • ENDOSCOPY N/A 2021    Procedure: ESOPHAGOGASTRODUODENOSCOPY WITH 52 [place] BRADFORD DILATION;  Surgeon: Tripp Pascal MD;  Location: [facility] ENDOSCOPY;  Service: Gastroenterology;  Laterality: N/A;  PRE- DYSPHAGIA  POST- ESOPHAGEAL RING   • JOINT REPLACEMENT     • KNEE SURGERY     • TONSILLECTOMY     • UTERINE FIBROID SURGERY      \"pesary\" device used to hold uterus in place due to prolapse                        PT Assessment/Plan     Row Name 22 1200          PT Assessment    Assessment Comments Ms. Plascencia returns to PT with reports of 7/10 pain that began last night causing her to have difficulty sleeping. Patient reports her increased pain may be directly related to performing various household chores causing her to carry heavy bags. Discussed with patient potential benefit of delegating lifting related tasks to friends/family to avoid B shoulder symptom exacerbation. Patient verbalizes understanding. Despite increased pain, patient tolerated progressing B shoulder HA/ER to sitting and addition of sitting B shoulder flexion/abdction raises and light weight ball circles. Ms. Plascencia " remains a good candidate for skilled PT.  -SHARI            PT Plan    PT Plan Comments Patient to decide if she wants to wrap up vs adding more appts. Update HEP consider supine SA punch if pt wants to continue with PT  -SHARI           User Key  (r) = Recorded By, (t) = Taken By, (c) = Cosigned By    Initials Name Provider Type    Rosi Logan, PT Physical Therapist                   OP Exercises     Row Name 07/05/22 1100             Subjective Comments    Subjective Comments My shoulders really started to ache late last night and they are still bothering me today  -SHARI              Subjective Pain    Able to rate subjective pain? yes  -SHARI      Pre-Treatment Pain Level 7  -SHARI              Total Minutes    99057 - PT Therapeutic Exercise Minutes 32  -SHARI      37492 - PT Manual Therapy Minutes 10  -SHARI              Exercise 1    Exercise Name 1 shoulder press  -SHARI      Sets 1 2  -SHARI      Reps 1 10  -SHARI      Time 1 2#  -SHARI              Exercise 2    Exercise Name 2 shoulder roll  -SHARI      Reps 2 15  -SHARI              Exercise 3    Exercise Name 3 tband row  -SHARI      Sets 3 2  -SHARI      Reps 3 10  -SHARI      Time 3 RTB  -SHARI              Exercise 4    Exercise Name 4 supine AAROM flexion dowel  -SHARI      Reps 4 10  -SHARI      Time 4 2#  -SHARI              Exercise 5    Exercise Name 5 sitting HA  -SHARI      Sets 5 2  -SHARI      Reps 5 10  -SHARI      Time 5 RTB  -SHARI              Exercise 6    Exercise Name 6 ball roll ups  -SHARI      Cueing 6 Verbal;Demo  -SHARI      Sets 6 1  -SHARI      Reps 6 10  -SHARI      Additional Comments pink ball  -SHARI              Exercise 7    Exercise Name 7 SL abduction  -SHARI      Sets 7 2  -SHARI      Reps 7 10  -SHARI      Time 7 1#  -SHARI              Exercise 8    Exercise Name 8 SL ER  -SHARI      Sets 8 2  -SHARI      Reps 8 10  -SHARI      Time 8 1#  -SHARI              Exercise 9    Exercise Name 9 shoulder extension  -SHARI      Cueing 9 Verbal;Demo  -SHARI      Sets 9 2  -SHARI      Reps 9 10  -SHARI      Time 9 RTB  -SHARI               Exercise 10    Exercise Name 10 sitting B ER  -SHARI      Sets 10 2  -SHARI      Reps 10 10  -SHARI      Time 10 RTB  -SHARI              Exercise 11    Exercise Name 11 around [place] ball pass  -SHARI      Cueing 11 Verbal;Demo  -SHARI      Sets 11 1  -SHARI      Reps 11 10e  -SHARI      Time 11 pink ball  -SHARI              Exercise 12    Exercise Name 12 OH press to chest press  -SHARI      Cueing 12 Verbal;Demo  -SHARI      Reps 12 10e  -SHARI      Time 12 alternating  -SHARI      Additional Comments pink ball  -SHARI              Exercise 13    Exercise Name 13 sitting B shoulder flexion/abduction raises  -SHARI      Cueing 13 Verbal;Demo  -SHARI      Sets 13 1  -SHARI      Reps 13 10e  -SHARI              Exercise 14    Exercise Name 14 ball circles  -SHARI      Cueing 14 Verbal;Demo  -SHARI      Sets 14 1  -SHARI      Reps 14 10e  -SHARI      Time 14 CW/CCW  -SHARI      Additional Comments pink ball  -SHARI            User Key  (r) = Recorded By, (t) = Taken By, (c) = Cosigned By    Initials Name Provider Type    Rosi Logan, PT Physical Therapist                         Manual Rx (last 36 hours)     Manual Treatments     Row Name 07/05/22 1100             Total Minutes    34072 - PT Manual Therapy Minutes 10  -SHARI              Manual Rx 1    Manual Rx 1 Location B shoulders  -SHARI      Manual Rx 1 Type PROM, gentle oscillations, posterior mobs  -SHARI              Manual Rx 2    Manual Rx 2 Location --  -SHARI            User Key  (r) = Recorded By, (t) = Taken By, (c) = Cosigned By    Initials Name Provider Type    Rosi Logan, PT Physical Therapist                 PT OP Goals     Row Name 07/05/22 1100          PT Short Term Goals    STG Date to Achieve 05/18/22  -SHARI     STG 1 Pt will be independent and verbalized good understanding of initial HEP to improve ability to manage pain, as well as improve strength and ROM.  -SHARI     STG 1 Progress Met  -SHARI     STG 2 Patient will report 50% reduction in B shoulder pain/ROM limitation when reaching overhead  and behind her back to don jacket.  -     STG 2 Progress Ongoing  -SHARI            Long Term Goals    LTG Date to Achieve 07/17/22  -SHARI     LTG 1 Pt will be independent and verbalized good understanding of advanced HEP to improve ability to manage pain, as well as improve strength and ROM.  -SHARI     LTG 1 Progress Ongoing  -SHARI     LTG 2 Pt will report 3/10 pain at worst in B shoulder with overhead activities to improve participation in ADLs.  -SHARI     LTG 2 Progress Ongoing  -SHARI     LTG 3 Patient will improve ability to sleep through the night with 0 sleep disturbances related to shoulder pain to improve overall sleep quality and quality of life  -SHARI     LTG 3 Progress Ongoing  -SHARI     LTG 4 Pt will improve DASH score to at least 20% perceived disability to show overall improved quality of life.  -     LTG 4 Progress Ongoing  -SHARI     LTG 5 Pt will improve B shoulder flexion to at least 155 deg, abduction to 150 deg, LAKISHA to T4, FIR to T12 to improve ability to reach overhead and behind her back.  -     LTG 5 Progress Ongoing  -           User Key  (r) = Recorded By, (t) = Taken By, (c) = Cosigned By    Initials Name Provider Type    Rosi Logan, PT Physical Therapist                               Time Calculation:   Start Time: 1130  Stop Time: 1212  Time Calculation (min): 42 min  Timed Charges  77506 - PT Therapeutic Exercise Minutes: 32  42616 - PT Manual Therapy Minutes: 10  Total Minutes  Timed Charges Total Minutes: 42   Total Minutes: 42  Therapy Charges for Today     Code Description Service Date Service Provider Modifiers Qty    59423022902  PT THER PROC EA 15 MIN 7/5/2022 Rosi Del Castillo, PT GP 2    99851958717 HC PT MANUAL THERAPY EA 15 MIN 7/5/2022 Rosi Del Castillo PT GP 1                    Rosi Del Castillo PT  7/5/2022

## 2022-07-07 ENCOUNTER — APPOINTMENT (OUTPATIENT)
Dept: PHYSICAL THERAPY | Facility: HOSPITAL | Age: 82
End: 2022-07-07

## 2022-08-09 ENCOUNTER — OFFICE VISIT (OUTPATIENT)
Dept: ORTHOPEDIC SURGERY | Facility: CLINIC | Age: 82
End: 2022-08-09

## 2022-08-09 VITALS — HEIGHT: 62 IN | BODY MASS INDEX: 39.2 KG/M2 | TEMPERATURE: 96.7 F | WEIGHT: 213 LBS

## 2022-08-09 DIAGNOSIS — M17.12 PRIMARY OSTEOARTHRITIS OF LEFT KNEE: Primary | ICD-10-CM

## 2022-08-09 PROCEDURE — 99204 OFFICE O/P NEW MOD 45 MIN: CPT | Performed by: ORTHOPAEDIC SURGERY

## 2022-08-09 PROCEDURE — 73562 X-RAY EXAM OF KNEE 3: CPT | Performed by: ORTHOPAEDIC SURGERY

## 2022-08-09 NOTE — PROGRESS NOTES
"Patient: Madisyn Plascencia  YOB: 1940 81 y.o. female  Medical Record Number: [id number]    Chief Complaints:   Chief Complaint   Patient presents with   • Left Knee - Pain     [facility]        History of Present Illness:Madisyn Plascencia is a 81 y.o. female who presents with complaints of severe constant left knee pain.  Has had previous injections takes Celebrex and has done physical therapy but despite these conservative measures she still can only walk short distances and has severe limitations of basic activities of daily living.    Allergies: No Known Allergies    Medications:   Current Outpatient Medications   Medication Sig Dispense Refill   • aspirin 81 MG EC tablet Take 81 mg by mouth daily.     • atorvastatin (LIPITOR) 10 MG tablet Take 10 mg by mouth daily.     • celecoxib (CeleBREX) 100 MG capsule Take 200 mg by mouth Daily.     • Cholecalciferol (VITAMIN D3) 5000 UNITS capsule capsule Take 5,000 Units by mouth daily.     • levothyroxine (SYNTHROID, LEVOTHROID) 100 MCG tablet Take 100 mcg by mouth daily.     • pantoprazole (PROTONIX) 40 MG EC tablet Take 40 mg by mouth Daily.     • ramipril (ALTACE) 5 MG capsule Take 5 mg by mouth daily.     • vitamin C (ASCORBIC ACID) 500 MG tablet Take 500 mg by mouth Daily.     • vitamin E 1000 UNIT capsule Take 400 Units by mouth Daily.       No current facility-administered medications for this visit.         The following portions of the patient's history were reviewed and updated as appropriate: allergies, current medications, past family history, past medical history, past social history, past surgical history and problem list.    Review of Systems:   A 14 point review of systems was performed. All systems negative except pertinent positives/negative listed in HPI above    Physical Exam:   Vitals:    08/09/22 1051   Temp: 96.7 °F (35.9 °C)   Weight: 96.6 kg (213 lb)   Height: 157.5 cm (62\")       General: A and O x 3, ASA, NAD    SCLERA:    Normal    DENTITION:  "  Normal   Knee:  left    ALIGNMENT:     Varus  ,   Patella  tracks  midline    GAIT:    Antalgic    SKIN:    No abnormality    RANGE OF MOTION:   3  -  118   DEG    STRENGTH:   4  / 5    LIGAMENTS:    No varus / valgus instability.   Negative  Lachman.    MENISCUS:     Negative   Shay       DISTAL PULSES:    Paplable    DISTAL SENSATION :   Intact    LYMPHATICS:     No   lymphadenopathy    OTHER:          - Positive   effusion      - Crepitance with ROM         Radiology:  Xrays 3views left knee (ap,lateral, sunrise) were ordered and reviewed for evaluation of knee pain demonstrating advanced varus osteoarthritis with bone on bone articulation, subchondral cysts, and periarticular osteophytes. There are no previous films for comparision.    Assessment/Plan: Left knee endstage OA  - has failed conservative measures.  Needs to go to PT. Will have her return in 2 months -  Needs medical clearance (due to issue with covid) -  If ready to proceed and has clearance can schedule for left TKA with [facility]-  Will need to go to [facility] for rehab. Will return in 8 weeks -  If cleared for surgery will proceed with scheduling               Gurinder Winslow MD  8/9/2022

## 2022-09-01 ENCOUNTER — TREATMENT (OUTPATIENT)
Dept: PHYSICAL THERAPY | Facility: CLINIC | Age: 82
End: 2022-09-01

## 2022-09-01 DIAGNOSIS — M62.569 MUSCLE WASTING AND ATROPHY, NEC, UNSP LOWER LEG: ICD-10-CM

## 2022-09-01 DIAGNOSIS — M25.562 ACUTE PAIN OF LEFT KNEE: Primary | ICD-10-CM

## 2022-09-01 DIAGNOSIS — R26.81 UNSTEADY GAIT: ICD-10-CM

## 2022-09-01 PROCEDURE — 97110 THERAPEUTIC EXERCISES: CPT | Performed by: PHYSICAL THERAPIST

## 2022-09-01 PROCEDURE — 97162 PT EVAL MOD COMPLEX 30 MIN: CPT | Performed by: PHYSICAL THERAPIST

## 2022-09-02 NOTE — PROGRESS NOTES
Physical Therapy Initial Evaluation and Plan of Care      Patient: Madisyn Plascencia   : 1940  Diagnosis/ICD-10 Code:  Acute pain of left knee [M25.562]  Referring practitioner: Gurinder Winslow MD  Date of Initial Visit: 2022  Today's Date: 2022  Patient seen for 1 sessions           Subjective Evaluation    History of Present Illness  Date of onset: 2022  Mechanism of injury: Hx of (R) TKA    Candidate for (L) TKA fall   10 min walking tolerance.  Difficulty with dressing/ grooming/ transfers/ functional mobility in -outside of home    Pt denies hx of falls.    Subjective comment: pt presents to PT reporting progressive (L) knee pain/ candidate for TKA later this fall. pt reports decreased tolerance with prolonged ambulation/ fear of falling in -outside of home. Quality of life: good    Pain  Current pain ratin  At best pain ratin  At worst pain ratin  Quality: dull ache, tight, grinding, knife-like, cramping, discomfort, pulling and squeezing  Aggravating factors: ambulation, squatting, stairs, standing, movement, lifting, outstretched reach and repetitive movement  Progression: worsening    Patient Goals  Patient goals for therapy: increased strength, independence with ADLs/IADLs, decreased pain, improved balance, increased motion and return to sport/leisure activities             Objective          Tenderness   Left Knee   Tenderness in the medial joint line.     Active Range of Motion   Left Knee   Flexion: 110 degrees   Extensor la degrees     Strength/Myotome Testing     Left Knee   Flexion: 4-  Extension: 3+  Quadriceps contraction: fair    Right Knee   Flexion: 4  Extension: 4  Quadriceps contraction: good    Additional Strength Details  (B) glute medius MMT 4-/5     General Comments     Knee Comments  10 min walking tolerance. Difficulty with ascending stairs/ pain with sit-stand transfer;  Intermittent bouts of night pain (L) knee. Unsteady gait in frontal/ transverse  plane mvmt patterns.        See Exercise, Manual, and Modality Logs for complete treatment.       Functional Outcome Score: 34/50 knee outcome survey; timed sit-stand x 5 24s         Assessment & Plan     Assessment  Impairments: abnormal gait, abnormal or restricted ROM, activity intolerance, impaired balance, impaired physical strength, lacks appropriate home exercise program, pain with function and safety issue  Functional Limitations: walking, uncomfortable because of pain, standing and stooping  Assessment details: Madisyn Plascencia is a 81 y.o. year-old female referred to physical therapy for (L) knee pain/ unsteady gait;   She presents with a evolving clinical presentation.  She has comorbidities OA (L) knee and is a candidate for (L) TKA later this year per medical clearance.  Prehab (L) TKA patient.  Pt demonstrates decreased (L) knee AAROM/ MMT; limited tolerance with safe prolonged functional mobility. Severe ttp (L) ant/ med joint line (L) knee.  Signs and symptoms are consistent with physical therapy diagnosis of (L) knee pain/ unsteady gait.   Prognosis: good    Goals  Plan Goals: stg 6 wks    Pt to be educated/ independent with initial HEP.    Decrease (L) ant/ med joint line ttp from severe to minimal to allow for increased ease with all transfers.    Increased (L) knee AAROM full knee extension to ensure optimal gait sequence    Pt to be independent with frontal plane mvmt patterns without evidence of LOB consistently with use of appropriate AD.    ltg 12 wks    Increased (L) quad/ glute medius MMT to 4/5 to allow for 25 mins standing/ walking task with use of appropriate AD.    Improve timed sit-stand x 5 test from 24 s to 21 s.    Pt to be independent with prehab program.     Plan  Therapy options: will be seen for skilled therapy services  Planned modality interventions: cryotherapy, electrical stimulation/Russian stimulation, TENS, ultrasound and thermotherapy (hydrocollator packs)  Planned therapy  interventions: abdominal trunk stabilization, manual therapy, motor coordination training, neuromuscular re-education, balance/weight-bearing training, ADL retraining, flexibility, functional ROM exercises, gait training, home exercise program, joint mobilization, transfer training, therapeutic activities, strengthening, stretching and soft tissue mobilization  Frequency: 1x week  Duration in weeks: 12  Treatment plan discussed with: patient        Timed:  Manual Therapy:       mins  39302;  Therapeutic Exercise:   25      mins  82214;     Neuromuscular Bruno:        mins  05046;    Therapeutic Activity:          mins  86794;     Gait Training:           mins  64876;     Ultrasound:          mins  33246;    Iontophoresis         mins 30930  Dry Needling        mins 20560/ 20561 (Self-pay)      Untimed:  Electrical Stimulation:         mins  63378 ( );  Traction:       mins  68014;   Low Eval          Mins  67537  Mod Eval    20      Mins  44140  High Eval                           Mins  10778    Timed Treatment:   25   mins   Total Treatment:     45   mins    PT SIGNATURE: SERA Kruse License Number: [id number]    Electronically signed by Carson Goodman PT, 09/02/22, 7:01 AM EDT    DATE TREATMENT INITIATED: 9/2/2022    Initial Certification  Certification Period: 12/1/2022  I certify that the therapy services are furnished while this patient is under my care.  The services outlined above are required by this patient, and will be reviewed every 90 days.     PHYSICIAN: Gurinder Winslow MD   NPI: [id number]                                         DATE:     Please sign and return via fax to [phone] Thank you, [facility].

## 2022-09-07 ENCOUNTER — TREATMENT (OUTPATIENT)
Dept: PHYSICAL THERAPY | Facility: CLINIC | Age: 82
End: 2022-09-07

## 2022-09-07 DIAGNOSIS — M62.569 MUSCLE WASTING AND ATROPHY, NEC, UNSP LOWER LEG: ICD-10-CM

## 2022-09-07 DIAGNOSIS — M25.562 ACUTE PAIN OF LEFT KNEE: Primary | ICD-10-CM

## 2022-09-07 DIAGNOSIS — R26.81 UNSTEADY GAIT: ICD-10-CM

## 2022-09-07 PROCEDURE — 97110 THERAPEUTIC EXERCISES: CPT | Performed by: PHYSICAL THERAPIST

## 2022-09-08 NOTE — PROGRESS NOTES
Physical Therapy Daily Progress Note    Patient: Madisyn Plascencia   : 1940  Diagnosis/ICD-10 Code:  Acute pain of left knee [M25.562]  Referring practitioner: Gurinder Winslow MD  Date of Initial Visit: Type: THERAPY  Noted: 2022  Today's Date: 2022  Patient seen for 2 sessions           Subjective knee feels stiff today    Objective   See Exercise, Manual, and Modality Logs for complete treatment.       Assessment/Plan  PT emphasis on PROM/ AAROM/ AROM; reviewed prehab program. Mild cuing with HEP.  Implemented mild quad/ glute  Medius strengthening activities.          Timed:    Manual Therapy:        mins  21173;  Therapeutic Exercise:     40    mins  08461;     Neuromuscular Bruno:        mins  75622;    Therapeutic Activity:          mins  13196;     Gait Training:           mins  37649;     Ultrasound:          mins  87521;    Electrical Stimulation:         mins  33044 ( );  Iontophoresis         mins 68955;  Aquatic Therapy         mins 61597;  Dry Needling                   mins 30369/  (Self-pay)    Untimed:  Electrical Stimulation:        mins  46070 ( );  Traction:         mins  44637;     Timed Treatment:   40   mins   Total Treatment:     40   mins    Carson Goodman, PT  Physical Therapist    [place] License: [id number]   Detail Level: Detailed Depth Of Biopsy: dermis Was A Bandage Applied: Yes Size Of Lesion In Cm: 0.6 Biopsy Type: H and E Biopsy Method: Personna blade Anesthesia Type: 1% lidocaine with epinephrine Anesthesia Volume In Cc (Will Not Render If 0): 0.5 Additional Anesthesia Volume In Cc (Will Not Render If 0): 0 Hemostasis: Aluminum Chloride Wound Care: Petrolatum Dressing: bandage Destruction After The Procedure: No Type Of Destruction Used: Curettage Curettage Text: The wound bed was treated with curettage after the biopsy was performed. Cryotherapy Text: The wound bed was treated with cryotherapy after the biopsy was performed. Electrodesiccation Text: The wound bed was treated with electrodesiccation after the biopsy was performed. Electrodesiccation And Curettage Text: The wound bed was treated with electrodesiccation and curettage after the biopsy was performed. Silver Nitrate Text: The wound bed was treated with silver nitrate after the biopsy was performed. Lab: 6 Lab Facility: 3 Consent: Verbal consent was obtained and risks were reviewed including but not limited to scarring, infection, bleeding, scabbing, incomplete removal, nerve damage and allergy to anesthesia. Post-Care Instructions: I reviewed with the patient in detail post-care instructions. Use soap and water to cleanse (no alcohol or hydrogen peroxide) and then apply Vaseline or Aquaphor twice daily until healed(NO Neosporin or triple antibiotic) Notification Instructions: Patient will be notified of biopsy results. However, patient instructed to call the office if not contacted within 2 weeks. Billing Type: Third-Party Bill Information: Selecting Yes will display possible errors in your note based on the variables you have selected. This validation is only offered as a suggestion for you. PLEASE NOTE THAT THE VALIDATION TEXT WILL BE REMOVED WHEN YOU FINALIZE YOUR NOTE. IF YOU WANT TO FAX A PRELIMINARY NOTE YOU WILL NEED TO TOGGLE THIS TO 'NO' IF YOU DO NOT WANT IT IN YOUR FAXED NOTE.

## 2022-09-09 ENCOUNTER — TREATMENT (OUTPATIENT)
Dept: PHYSICAL THERAPY | Facility: CLINIC | Age: 82
End: 2022-09-09

## 2022-09-09 DIAGNOSIS — M25.562 ACUTE PAIN OF LEFT KNEE: Primary | ICD-10-CM

## 2022-09-09 DIAGNOSIS — R26.81 UNSTEADY GAIT: ICD-10-CM

## 2022-09-09 PROCEDURE — 97110 THERAPEUTIC EXERCISES: CPT | Performed by: PHYSICAL THERAPIST

## 2022-09-09 NOTE — PROGRESS NOTES
Physical Therapy Daily Treatment Note    Patient: Madisyn Plascencia   : 1940  Diagnosis/ICD-10 Code:  Acute pain of left knee [M25.562]  Referring practitioner: Gurinder Winslow MD  Date of Initial Visit: Type: THERAPY  Noted: 2022  Today's Date: 2022  Patient seen for 3 sessions           Subjective   Madisyn reports she is thinking of re-joining the gym to allow her to water walk for exercise    Objective     EXERCISES:  -NuStep 6 min  -SLR 2x15  -Heel slides 20 B  -Bridges 2x15  -Gait 5 min  -STS from mat x 10  -(A) Leg press 20 lbs, 2x20  -MIP standing x 30  -Positional ext with quad set x 20, 5 sec hold    Assessment/Plan  Madisyn complained of increased knee pain during leg press exercise. She also had some grinding during quad sets while in positional knee extension. Good hip flexor strength noted with SLR exercise. Tried to work on even WB for sit to stand transfers.         Timed:    Manual Therapy:         mins  14811;  Therapeutic Exercise:    40     mins  88108;     Neuromuscular Bruno:        mins  92277;    Therapeutic Activity:          mins  90656;     Gait Training:           mins  69431;     Ultrasound:          mins  35472;    Electrical Stimulation:         mins  93740 ( );  Iontophoresis         mins 37779;  Aquatic Therapy         mins 25773;  Dry Needling                   mins 74701/  (Self-pay)    Untimed:  Electrical Stimulation:         mins  03811 ( );  Traction:         mins  64505;     Timed Treatment:   40   mins   Total Treatment:     40   mins    Olivia Morales PT  Physical Therapist    [place] License:[id number]

## 2022-09-12 ENCOUNTER — TREATMENT (OUTPATIENT)
Dept: PHYSICAL THERAPY | Facility: CLINIC | Age: 82
End: 2022-09-12

## 2022-09-12 DIAGNOSIS — R26.81 UNSTEADY GAIT: ICD-10-CM

## 2022-09-12 DIAGNOSIS — M25.562 ACUTE PAIN OF LEFT KNEE: Primary | ICD-10-CM

## 2022-09-12 PROCEDURE — 97110 THERAPEUTIC EXERCISES: CPT | Performed by: PHYSICAL THERAPIST

## 2022-09-12 NOTE — PROGRESS NOTES
Physical Therapy Daily Treatment Note    Patient: Madisyn Plascencia   : 1940  Diagnosis/ICD-10 Code:  Acute pain of left knee [M25.562]  Referring practitioner: Gurinder Winslow MD  Date of Initial Visit: Type: THERAPY  Noted: 2022  Today's Date: 2022  Patient seen for 4 sessions           Subjective   Madisyn reports she has arthritis all over that seems to be flaring up.    Objective     EXERCISES:  -NuStep 6 min  -SLR 2x15  -Heel slides 30x B   -Bridges 2x15  -Gait 5 min  -STS from mat x 10 *defer  -(1K) Leg press 80 lbs, 3x20  -6K Seated Hip abduction 40 lbs, 3x15  -C Hamstring Curls, 15 lbs, 3x10  -MIP standing x 30 *defer  -Positional ext with quad set x 20, 5 sec hold *defer    Assessment/Plan   Madisyn plans to start coming to the gym and performing her exercise program independently. Wrote out machines with correct weights that were added today. Emphasized slow movement with machine exercises to promote better muscle control.            Timed:    Manual Therapy:         mins  29765;  Therapeutic Exercise:    40     mins  19914;     Neuromuscular Bruno:        mins  91627;    Therapeutic Activity:          mins  59386;     Gait Training:           mins  28714;     Ultrasound:          mins  57515;    Electrical Stimulation:         mins  89294 ( );  Iontophoresis         mins 53237;  Aquatic Therapy         mins 59722;  Dry Needling                   mins 56618/  (Self-pay)    Untimed:  Electrical Stimulation:         mins  48771 ( );  Traction:         mins  14296;     Timed Treatment:   40   mins   Total Treatment:     40   mins    Olivia Morales PT  Physical Therapist    [place] License:[id number]

## 2022-09-14 ENCOUNTER — TREATMENT (OUTPATIENT)
Dept: PHYSICAL THERAPY | Facility: CLINIC | Age: 82
End: 2022-09-14

## 2022-09-14 DIAGNOSIS — M25.562 ACUTE PAIN OF LEFT KNEE: Primary | ICD-10-CM

## 2022-09-14 DIAGNOSIS — R26.81 UNSTEADY GAIT: ICD-10-CM

## 2022-09-14 DIAGNOSIS — M62.569 MUSCLE WASTING AND ATROPHY, NEC, UNSP LOWER LEG: ICD-10-CM

## 2022-09-14 PROCEDURE — 97110 THERAPEUTIC EXERCISES: CPT | Performed by: PHYSICAL THERAPIST

## 2022-09-15 NOTE — PROGRESS NOTES
Physical Therapy Daily Progress Note    Patient: Madisyn Plascencia   : 1940  Diagnosis/ICD-10 Code:  Acute pain of left knee [M25.562]  Referring practitioner: Gurinder Winslow MD  Date of Initial Visit: Type: THERAPY  Noted: 2022  Today's Date: 9/15/2022  Patient seen for 5 sessions           Subjective   Trying to do my exercises at home.  Knee seems straight.   Objective   See Exercise, Manual, and Modality Logs for complete treatment.       Assessment/Plan  Progressed with knee AAROM/ AROM; implemented quad-glute medius strengthening per pt tolerance. Mild cuing with HEP.          Timed:    Manual Therapy:       mins  99063;  Therapeutic Exercise:      40   mins  41114;     Neuromuscular Bruno:        mins  32619;    Therapeutic Activity:          mins  74137;     Gait Training:           mins  87256;     Ultrasound:          mins  52994;    Electrical Stimulation:         mins  54776 ( );  Iontophoresis         mins 32952;  Aquatic Therapy         mins 86399;  Dry Needling                   mins 17156/  (Self-pay)    Untimed:  Electrical Stimulation:         mins  10047 ( );  Traction:         mins  55357;     Timed Treatment:  40    mins   Total Treatment:     40   mins    Carson Goodman, PT  Physical Therapist    [place] License: [id number]

## 2022-09-16 ENCOUNTER — TELEPHONE (OUTPATIENT)
Dept: PHYSICAL THERAPY | Facility: CLINIC | Age: 82
End: 2022-09-16

## 2022-09-16 NOTE — TELEPHONE ENCOUNTER
PATIENT CALLED BACK WANTED TO KNOW IF THE AVAILABILITY FOR NEGRITO ON 9/21  WAS STILL OPEN- IF IT IS SHE WOULD LIKE IT AND REQUESTED YOU GIVE HER A CALL BACK WHEN YOU CAN! THANK YOU SO MUCH!

## 2022-09-21 ENCOUNTER — TREATMENT (OUTPATIENT)
Dept: PHYSICAL THERAPY | Facility: CLINIC | Age: 82
End: 2022-09-21

## 2022-09-21 DIAGNOSIS — M62.569 MUSCLE WASTING AND ATROPHY, NEC, UNSP LOWER LEG: ICD-10-CM

## 2022-09-21 DIAGNOSIS — M25.562 ACUTE PAIN OF LEFT KNEE: Primary | ICD-10-CM

## 2022-09-21 DIAGNOSIS — R26.81 UNSTEADY GAIT: ICD-10-CM

## 2022-09-21 PROCEDURE — 97110 THERAPEUTIC EXERCISES: CPT | Performed by: PHYSICAL THERAPIST

## 2022-09-21 NOTE — PROGRESS NOTES
Physical Therapy Daily Progress Note    Patient: Madisyn Plascencia   : 1940  Diagnosis/ICD-10 Code:  Acute pain of left knee [M25.562]  Referring practitioner: Gurinder Winslow MD  Date of Initial Visit: Type: THERAPY  Noted: 2022  Today's Date: 2022  Patient seen for 6 sessions           Subjective knee is feeling stiff.     Objective   See Exercise, Manual, and Modality Logs for complete treatment.       Assessment/Plan  Progressed with knee AAROM/ AROM; mod ttp (L) ant /med joint line. (L) knee AAROM 0.0/124; mild antalgia w gait with use of spc. Mild cuing w HEP         Timed:    Manual Therapy:        mins  34328;  Therapeutic Exercise:      35  mins  10930;     Neuromuscular Bruno:        mins  33223;    Therapeutic Activity:          mins  88891;     Gait Training:           mins  81340;     Ultrasound:          mins  20683;    Electrical Stimulation:         mins  49716 ( );  Iontophoresis         mins 43787;  Aquatic Therapy         mins 23919;  Dry Needling                   mins /  (Self-pay)    Untimed:  Electrical Stimulation:         mins  11960 ( );  Traction:         mins  38000;     Timed Treatment:  35    mins   Total Treatment:    35    mins    Carson Goodman PT  Physical Therapist    [place] License: [id number]

## 2022-09-23 ENCOUNTER — TREATMENT (OUTPATIENT)
Dept: PHYSICAL THERAPY | Facility: CLINIC | Age: 82
End: 2022-09-23

## 2022-09-23 DIAGNOSIS — M62.569 MUSCLE WASTING AND ATROPHY, NEC, UNSP LOWER LEG: ICD-10-CM

## 2022-09-23 DIAGNOSIS — R26.81 UNSTEADY GAIT: ICD-10-CM

## 2022-09-23 DIAGNOSIS — M25.562 ACUTE PAIN OF LEFT KNEE: Primary | ICD-10-CM

## 2022-09-23 PROCEDURE — 97530 THERAPEUTIC ACTIVITIES: CPT | Performed by: PHYSICAL THERAPIST

## 2022-09-23 PROCEDURE — 97110 THERAPEUTIC EXERCISES: CPT | Performed by: PHYSICAL THERAPIST

## 2022-09-23 NOTE — PROGRESS NOTES
Physical Therapy Daily Treatment Note    Patient: Madisyn Plascencia   : 1940  Diagnosis/ICD-10 Code:  Acute pain of left knee [M25.562]  Referring practitioner: Gurinder Winslow MD  Date of Initial Visit: Type: THERAPY  Noted: 2022  Today's Date: 2022  Patient seen for 7 sessions           Subjective   Madisyn reports her knee is about the same. Probably will be until she has TKA surgery.    Objective       EXERCISES:  -NuStep 6 min *defer  -Recumbent bike 5 min  -Gait around track 1 lap  -SLR x15  -Heel slides 30x B *defer  -Bridges 2x15  -STS from mat x 10 *defer  -(1K) Leg press 80 lbs, 3x20  -6K Seated Hip abduction 40 lbs, 3x15  -C Hamstring Curls, 15 lbs, 3x10  -Marches along rail, 20ft  -Positional ext with ice, 5 min    Assessment/Plan  Madisyn was able to utilize recumbent bike versus NuStep today. She had a little trouble initially achieving full revolutions with her L knee but then her knee loosened up a little. Added walking marches to promote SLS balance but increased knee pain noted in L LE. Utilized ice to help increase tolerance to prolonged knee extension stretch.          Timed:    Manual Therapy:         mins  05539;  Therapeutic Exercise:    30     mins  66098;     Neuromuscular Bruno:        mins  91112;    Therapeutic Activity:     10     mins  05793;     Gait Training:           mins  42606;     Ultrasound:          mins  23534;    Electrical Stimulation:         mins  63834 ( );  Iontophoresis         mins 21225;  Aquatic Therapy         mins 66111;    Untimed:  Electrical Stimulation:         mins  01574 ( );  Traction:         mins  17400;   Dry Needling   (1-2 muscles)       mins  (Self-pay)  Dry Needling (3-4 muscles)        mins  (Self-pay)  Dry Needling Trial          mins DRYNDLTRIAL  (No Charge)    Timed Treatment:  40    mins   Total Treatment:     40   mins    Olivia Morales PT  Physical Therapist    [place] License:[id number]

## 2022-09-26 ENCOUNTER — OFFICE VISIT (OUTPATIENT)
Dept: ORTHOPEDIC SURGERY | Facility: CLINIC | Age: 82
End: 2022-09-26

## 2022-09-26 ENCOUNTER — TREATMENT (OUTPATIENT)
Dept: PHYSICAL THERAPY | Facility: CLINIC | Age: 82
End: 2022-09-26

## 2022-09-26 VITALS — WEIGHT: 213 LBS | TEMPERATURE: 98.2 F | HEIGHT: 62 IN | BODY MASS INDEX: 39.2 KG/M2

## 2022-09-26 DIAGNOSIS — M25.511 BILATERAL SHOULDER PAIN, UNSPECIFIED CHRONICITY: Primary | ICD-10-CM

## 2022-09-26 DIAGNOSIS — M25.511 CHRONIC RIGHT SHOULDER PAIN: ICD-10-CM

## 2022-09-26 DIAGNOSIS — G89.29 CHRONIC LEFT SHOULDER PAIN: ICD-10-CM

## 2022-09-26 DIAGNOSIS — M25.562 ACUTE PAIN OF LEFT KNEE: Primary | ICD-10-CM

## 2022-09-26 DIAGNOSIS — M25.512 CHRONIC LEFT SHOULDER PAIN: ICD-10-CM

## 2022-09-26 DIAGNOSIS — R26.81 UNSTEADY GAIT: ICD-10-CM

## 2022-09-26 DIAGNOSIS — G89.29 CHRONIC RIGHT SHOULDER PAIN: ICD-10-CM

## 2022-09-26 DIAGNOSIS — M25.512 BILATERAL SHOULDER PAIN, UNSPECIFIED CHRONICITY: Primary | ICD-10-CM

## 2022-09-26 PROCEDURE — 20610 DRAIN/INJ JOINT/BURSA W/O US: CPT | Performed by: ORTHOPAEDIC SURGERY

## 2022-09-26 PROCEDURE — 99213 OFFICE O/P EST LOW 20 MIN: CPT | Performed by: ORTHOPAEDIC SURGERY

## 2022-09-26 PROCEDURE — 97110 THERAPEUTIC EXERCISES: CPT | Performed by: PHYSICAL THERAPIST

## 2022-09-26 PROCEDURE — 73030 X-RAY EXAM OF SHOULDER: CPT | Performed by: ORTHOPAEDIC SURGERY

## 2022-09-26 RX ORDER — LEVOTHYROXINE SODIUM 0.1 MG/1
100 TABLET ORAL DAILY
COMMUNITY
Start: 2022-08-18 | End: 2022-11-16

## 2022-09-26 RX ADMIN — LIDOCAINE HYDROCHLORIDE 2 ML: 10 INJECTION, SOLUTION EPIDURAL; INFILTRATION; INTRACAUDAL; PERINEURAL at 16:23

## 2022-09-26 RX ADMIN — LIDOCAINE HYDROCHLORIDE 2 ML: 10 INJECTION, SOLUTION EPIDURAL; INFILTRATION; INTRACAUDAL; PERINEURAL at 16:22

## 2022-09-26 RX ADMIN — METHYLPREDNISOLONE ACETATE 80 MG: 80 INJECTION, SUSPENSION INTRA-ARTICULAR; INTRALESIONAL; INTRAMUSCULAR; SOFT TISSUE at 16:22

## 2022-09-26 RX ADMIN — METHYLPREDNISOLONE ACETATE 80 MG: 80 INJECTION, SUSPENSION INTRA-ARTICULAR; INTRALESIONAL; INTRAMUSCULAR; SOFT TISSUE at 16:23

## 2022-09-26 NOTE — PROGRESS NOTES
Patient: Madisyn Plascencia    YOB: 1940    Medical Record Number: [id number]    Chief Complaints:   Bilateral shoulder pain    History of Present Illness:     81 y.o. female patient who presents with a complaint of bilateral shoulder pain.  She reports that the symptoms first started roughly 4 or 5 months ago.  Denies any injury, precipitating event or factor.  Pain is moderate, constant and aching.  Her pain is worse with certain reaching and lifting movements, particularly overhead.  She denies any alleviating factors.  She had one previous injection that helped for a few days at most.  Formal PT did not help either. She denies any shooting pain down the arm, distal weakness, numbness or paresthesias.  Of note, she is scheduled to have knee surgery with Dr. Winslow in the near future.  She is worried that her shoulders will bother her during her recovery.    Allergies: No Known Allergies    Home Medications:    Current Outpatient Medications:   •  aspirin 81 MG EC tablet, Take 81 mg by mouth daily., Disp: , Rfl:   •  atorvastatin (LIPITOR) 10 MG tablet, Take 10 mg by mouth daily., Disp: , Rfl:   •  celecoxib (CeleBREX) 100 MG capsule, Take 200 mg by mouth Daily., Disp: , Rfl:   •  Cholecalciferol (VITAMIN D3) 5000 UNITS capsule capsule, Take 5,000 Units by mouth daily., Disp: , Rfl:   •  levothyroxine (SYNTHROID, LEVOTHROID) 100 MCG tablet, Take 100 mcg by mouth daily., Disp: , Rfl:   •  levothyroxine (SYNTHROID, LEVOTHROID) 100 MCG tablet, Take 100 mcg by mouth., Disp: , Rfl:   •  pantoprazole (PROTONIX) 40 MG EC tablet, Take 40 mg by mouth Daily., Disp: , Rfl:   •  ramipril (ALTACE) 5 MG capsule, Take 5 mg by mouth daily., Disp: , Rfl:   •  vitamin C (ASCORBIC ACID) 500 MG tablet, Take 500 mg by mouth Daily., Disp: , Rfl:   •  vitamin E 1000 UNIT capsule, Take 400 Units by mouth Daily., Disp: , Rfl:     Past Medical History:   Diagnosis Date   • Arthritis    • C. difficile colitis    • Disease of  "thyroid gland    • GERD (gastroesophageal reflux disease)    • H/O uterine prolapse    • Hyperlipidemia    • Hypertension    • Hypothyroidism    • Knee swelling     Had right knee replaced first … had to choose which knee first   • Osteopenia    • Scoliosis    • Shoulder injury        Past Surgical History:   Procedure Laterality Date   • CHOLECYSTECTOMY     • COLONOSCOPY     • ENDOSCOPY     • ENDOSCOPY N/A 2021    Procedure: ESOPHAGOGASTRODUODENOSCOPY WITH 52 [place] BRADFORD DILATION;  Surgeon: Tripp Pascal MD;  Location: [facility] ENDOSCOPY;  Service: Gastroenterology;  Laterality: N/A;  PRE- DYSPHAGIA  POST- ESOPHAGEAL RING   • JOINT REPLACEMENT     • KNEE SURGERY     • TONSILLECTOMY     • UTERINE FIBROID SURGERY      \"pesary\" device used to hold uterus in place due to prolapse       Social History     Occupational History   • Not on file   Tobacco Use   • Smoking status: Never Smoker   • Smokeless tobacco: Never Used   Vaping Use   • Vaping Use: Never used   Substance and Sexual Activity   • Alcohol use: No   • Drug use: Never   • Sexual activity: Not Currently     Partners: Male     Birth control/protection: None     Comment: N/A      Social History     Social History Narrative   • Not on file       Family History   Problem Relation Age of Onset   • Cancer Mother             • Early death Mother    • Heart attack Father    • Heart disease Father    • Migraines Daughter    • Malig Hyperthermia Neg Hx        Review of Systems:      Constitutional: Denies fever, shaking or chills   Eyes: Denies change in visual acuity   HEENT: Denies nasal congestion or sore throat   Respiratory: Denies cough or shortness of breath   Cardiovascular: Denies chest pain or edema  Endocrine: Denies tremors, palpitations, intolerance of heat or cold, polyuria, polydipsia.  GI: Denies abdominal pain, nausea, vomiting, bloody stools or diarrhea  : Denies frequency, urgency, incontinence, retention, or " "nocturia.  Musculoskeletal: Denies numbness, tingling or loss of motor function except as above  Integument: Denies rash, lesion or ulceration   Neurologic: Denies headache or focal weakness, deficits  Heme: Denies spontaneous or excessive bleeding, epistaxis, hematuria, melena, fatigue, enlarged or tender lymph nodes.      All other pertinent positives and negatives as noted above in HPI.    Physical Exam:   81 y.o. female  Vitals:    09/26/22 1558   Temp: 98.2 °F (36.8 °C)   Weight: 96.6 kg (213 lb)   Height: 157.5 cm (62.01\")     General:  Patient is awake and alert.  Appears in no acute distress or discomfort.    Psych:  Affect and demeanor are appropriate.    Neck:  Supple.  Normal ROM.  Negative Spurling's for shoulder or arm pain.    Extremities: Her exam is fairly symmetric.  Skin is benign bilaterally.  No gross abnormalities on inspection including any atrophy, swellings, or masses.  No palpable masses or adenopathy.  Moderate anterior tenderness.  Full shoulder motion.  No evident instability or apprehension.  Mildly positive Neer, Wu maneuvers.  Weakness with forward elevation in the scapular plane bilaterally.   Good strength with internal and external rotation bilaterally.  Good strength in her biceps, triceps, and .  Intact sensation throughout the arm and hand.  Brisk cap refill.  Palpable radial pulse.  Good skin turgor.         Radiology:  AP, scapular Y, and axillary views of both shoulders are ordered by myself and reviewed to evaluate the patient's complaint.  No comparison films are immediately available.  The x-rays show some narrowing of the glenohumeral articulation on the axillary view bilaterally.  She does not have any significant osteophyte formation, subchondral sclerosis or other evidence for arthritis.  There are no obvious acute abnormalities, lesions, masses, or other concerning findings.  The acromiohumeral interval is normal.  Glenoid version appears normal as " well.    Assessment/Plan:   Bilateral shoulder pain, possible rotator cuff tears    We discussed treatment options in detail.  I have recommended that we start with a conservative approach.  With regards to conservative options, we discussed appropriate activity modifications, icing as needed, anti-inflammatories, physical therapy, and injections.  She has already tried the physical therapy without significant improvement.  She had 1 shot which did not help but she is willing to try that again, particularly in light of the potential upcoming knee surgery.  The risks, benefits and alternatives to the injections were thoroughly discussed including potential elevated risk with bilateral injections.  She acknowledged understanding and consented.  The injections were performed as described below.  She will follow-up with me if the symptoms persist or recur.  I explained that the next step for her would be referral for MRI to evaluate for possible rotator cuff tear.    Venkata Hamilton MD    09/26/2022    Large Joint Arthrocentesis: L subacromial bursa  Date/Time: 9/26/2022 4:22 PM  Consent given by: patient  Site marked: site marked  Timeout: Immediately prior to procedure a time out was called to verify the correct patient, procedure, equipment, support staff and site/side marked as required   Supporting Documentation  Indications: pain and joint swelling   Procedure Details  Location: shoulder - L subacromial bursa  Preparation: Patient was prepped and draped in the usual sterile fashion  Needle gauge: 21g.  Approach: posterior  Medications administered: 80 mg methylPREDNISolone acetate 80 MG/ML; 2 mL lidocaine PF 1% 1 %  Patient tolerance: patient tolerated the procedure well with no immediate complications    Large Joint Arthrocentesis: R subacromial bursa  Date/Time: 9/26/2022 4:23 PM  Consent given by: patient  Site marked: site marked  Timeout: Immediately prior to procedure a time out was called to verify the  correct patient, procedure, equipment, support staff and site/side marked as required   Supporting Documentation  Indications: pain and joint swelling   Procedure Details  Location: shoulder - R subacromial bursa  Preparation: Patient was prepped and draped in the usual sterile fashion  Needle gauge: 21g.  Approach: posterior  Medications administered: 80 mg methylPREDNISolone acetate 80 MG/ML; 2 mL lidocaine PF 1% 1 %  Patient tolerance: patient tolerated the procedure well with no immediate complications            CC to Seda Amaya DO

## 2022-09-26 NOTE — PROGRESS NOTES
Physical Therapy Daily Treatment Note      Patient: Madisyn Plascencia   : 1940  Referring practitioner: Gurinder Winslow MD  Date of Initial Visit: Type: THERAPY  Noted: 2022  Today's Date: 2022  Patient seen for 8 sessions       Visit Diagnoses:    ICD-10-CM ICD-9-CM   1. Acute pain of left knee  M25.562 719.46   2. Unsteady gait  R26.81 781.2       Subjective Evaluation    History of Present Illness    Subjective comment: sees ortho next week. most likely will have surgery soon and rehab at [facility]. no support at home. also to see Dr. Hamilton later today for B shoulder issue. Needs to discuss pros/cons of cortisone so close to TKA surgery.        Objective   See Exercise, Manual, and Modality Logs for complete treatment.       Assessment & Plan     Assessment    Assessment details: Pt with moderate infrapatellar pain thus tried KT tape today with mild relief.   Review of ortho notes from shoulder exam today show that pt underwent injections and obtained a referral for PT. Normal radiology but possible cuff tear with plans for conservative approach per ortho.   P: Discuss with pt plan for PT rx and next steps for TKA. Appears to be hindered by shoulder pain to the point that using a walker and living alone could be a challenge post TKA, unless shoulder AROM is more functional.           Timed:         Manual Therapy:         mins  68193;     Therapeutic Exercise:    45     mins  62848;     Neuromuscular Bruno:        mins  51695;    Therapeutic Activity:          mins  42439;     Gait Training:           mins  71617;     Ultrasound:          mins  39307;    Ionto                                   mins   43023  Self Care                            mins   66524  Canalith Repos         mins 76050      Un-Timed:  Electrical Stimulation:         mins  08126 ( );  Dry Needling          mins self-pay  Traction          mins 61534      Timed Treatment:   45   mins   Total Treatment:     45   mins    Elodia  Zeno Kimura, PT  [place] License: [id number]    [place] License:  [id number]

## 2022-09-27 RX ORDER — METHYLPREDNISOLONE ACETATE 80 MG/ML
80 INJECTION, SUSPENSION INTRA-ARTICULAR; INTRALESIONAL; INTRAMUSCULAR; SOFT TISSUE
Status: COMPLETED | OUTPATIENT
Start: 2022-09-26 | End: 2022-09-26

## 2022-09-27 RX ORDER — LIDOCAINE HYDROCHLORIDE 10 MG/ML
2 INJECTION, SOLUTION EPIDURAL; INFILTRATION; INTRACAUDAL; PERINEURAL
Status: COMPLETED | OUTPATIENT
Start: 2022-09-26 | End: 2022-09-26

## 2022-09-30 ENCOUNTER — TELEPHONE (OUTPATIENT)
Dept: ORTHOPEDIC SURGERY | Facility: CLINIC | Age: 82
End: 2022-09-30

## 2022-09-30 NOTE — TELEPHONE ENCOUNTER
I recommend that she not get the vaccines within 1 week of surgery, either before or after.  Otherwise, okay to get the vaccines anytime.

## 2022-09-30 NOTE — TELEPHONE ENCOUNTER
Provider: DR MACIAS    Caller: SHANAE LAGUNA    Relationship to Patient: SELF    Phone Number: [phone]    Reason for Call: WANTS TO KNOW IF SHE CAN GET THE FLU SHOT AND/OR COVID BOOSTER BEFORE SURGERY, OR HOW LONG IT NEEDS TO BE BETWEEN GETTING THE VACCINES AND HAVING SURGERY.

## 2022-10-04 ENCOUNTER — OFFICE VISIT (OUTPATIENT)
Dept: ORTHOPEDIC SURGERY | Facility: CLINIC | Age: 82
End: 2022-10-04

## 2022-10-04 VITALS — BODY MASS INDEX: 39.2 KG/M2 | TEMPERATURE: 97.3 F | RESPIRATION RATE: 16 BRPM | HEIGHT: 62 IN | WEIGHT: 213 LBS

## 2022-10-04 DIAGNOSIS — M17.12 PRIMARY OSTEOARTHRITIS OF LEFT KNEE: Primary | ICD-10-CM

## 2022-10-04 PROCEDURE — 99214 OFFICE O/P EST MOD 30 MIN: CPT | Performed by: NURSE PRACTITIONER

## 2022-10-04 RX ORDER — POVIDONE-IODINE 10 MG/ML
1 SOLUTION TOPICAL ONCE
Status: CANCELLED | OUTPATIENT
Start: 2023-01-18 | End: 2022-10-04

## 2022-10-04 RX ORDER — POVIDONE-IODINE 10 MG/ML
SOLUTION TOPICAL ONCE
Status: CANCELLED | OUTPATIENT
Start: 2023-01-18 | End: 2022-10-04

## 2022-10-04 RX ORDER — CEFAZOLIN SODIUM IN 0.9 % NACL 3 G/100 ML
3 INTRAVENOUS SOLUTION, PIGGYBACK (ML) INTRAVENOUS ONCE
Status: CANCELLED | OUTPATIENT
Start: 2023-01-18 | End: 2022-10-04

## 2022-10-04 RX ORDER — PREGABALIN 75 MG/1
150 CAPSULE ORAL ONCE
Status: CANCELLED | OUTPATIENT
Start: 2023-01-18 | End: 2022-10-04

## 2022-10-04 RX ORDER — CHLORHEXIDINE GLUCONATE 500 MG/1
CLOTH TOPICAL 2 TIMES DAILY
Status: CANCELLED | OUTPATIENT
Start: 2022-10-04

## 2022-10-04 RX ORDER — MELOXICAM 15 MG/1
15 TABLET ORAL ONCE
Status: CANCELLED | OUTPATIENT
Start: 2023-01-18 | End: 2022-10-04

## 2022-10-04 RX ORDER — CEFAZOLIN SODIUM 2 G/100ML
2 INJECTION, SOLUTION INTRAVENOUS ONCE
Status: CANCELLED | OUTPATIENT
Start: 2023-01-18 | End: 2022-10-04

## 2022-10-04 NOTE — PROGRESS NOTES
Patient: Madisyn Plascencia  YOB: 1940 81 y.o. female  Medical Record Number: [id number]    Chief Complaints:   Chief Complaint   Patient presents with   • Left Knee - Follow-up       History of Present Illness:Madisyn Plascencia is a 81 y.o. female who presents for follow-up known left knee osteoarthritis.  Patient was recently evaluated by Dr. Winslow in which he recommended proceeding forward with left total knee replacement once patient becomes medically optimized.  Patient is here today to discuss surgery.  Patient reports that her symptoms have slightly worsened since her last visit and she is severely relying on a cane for ambulation.  Patient states that she recently saw her Dr. Seda Amaya and discussed surgery with the doctor.  Patient reports that her doctor wanted us to send over a formal request for medical clearance and then she would put a note in the chart giving us clearance.     Allergies: No Known Allergies    Medications:   Current Outpatient Medications   Medication Sig Dispense Refill   • aspirin 81 MG EC tablet Take 81 mg by mouth daily.     • atorvastatin (LIPITOR) 10 MG tablet Take 10 mg by mouth daily.     • celecoxib (CeleBREX) 100 MG capsule Take 200 mg by mouth Daily.     • Cholecalciferol (VITAMIN D3) 5000 UNITS capsule capsule Take 5,000 Units by mouth daily.     • levothyroxine (SYNTHROID, LEVOTHROID) 100 MCG tablet Take 100 mcg by mouth daily.     • pantoprazole (PROTONIX) 40 MG EC tablet Take 40 mg by mouth Daily.     • ramipril (ALTACE) 5 MG capsule Take 5 mg by mouth daily.     • vitamin C (ASCORBIC ACID) 500 MG tablet Take 500 mg by mouth Daily.     • vitamin E 1000 UNIT capsule Take 400 Units by mouth Daily.     • levothyroxine (SYNTHROID, LEVOTHROID) 100 MCG tablet Take 100 mcg by mouth.       No current facility-administered medications for this visit.         The following portions of the patient's history were reviewed and updated as appropriate: allergies, current  "medications, past family history, past medical history, past social history, past surgical history and problem list.    Review of Systems:   A 14 point review of systems was performed. All systems negative except pertinent positives/negative listed in HPI above    Physical Exam:   Vitals:    10/04/22 1500   Resp: 16   Temp: 97.3 °F (36.3 °C)   TempSrc: Temporal   Weight: 96.6 kg (213 lb)   Height: 157.5 cm (62.01\")   PainSc: 0-No pain       General: A and O x 3, ASA, NAD    SCLERA:    Normal    DENTITION:   Normal    Knee:  left    ALIGNMENT:     Varus  ,   Patella  tracks  midline    GAIT:    Antalgic    SKIN:    No abnormality    RANGE OF MOTION:   3  -  118   DEG    STRENGTH:   4  / 5    LIGAMENTS:    No varus / valgus instability.   Negative  Lachman.    MENISCUS:     Negative   Shay       DISTAL PULSES:    Paplable    DISTAL SENSATION :   Intact    LYMPHATICS:     No   lymphadenopathy    OTHER:          - Positive   effusion      - Crepitance with ROM       Radiology:  Xrays 3views (ap,lateral, sunrise) taken previously demonstratingadvanced varus osteoarthritis with bone on bone articulation, subchondral cysts, and periarticular osteophytes with patellofemoral osteoarthritis.  No new x-rays were taken today for comparison purposes.    Assessment/Plan: Primary osteoarthritis left knee  I did attempt to call patient's PCP office to get verbal medical clearance from Dr. Amaya.  The PCP was unavailable, but I did speak with her nurse. Dr. Amaya's nurse states that she reviewed the patient's chart and did not see why she would be unable to proceed forward with surgery.  She states that she would speak with Dr. Amaya whenever she returns back to clinic and would have her put a note in the chart giving medical clearance.  Have spoken with Dr. Winslow and he is okay for us to go ahead and proceed forward with scheduling surgery for a left total knee replacement.     Continuation of conservative management vs. TKA " discussed.  The patient wishes to proceed with total knee replacement.  At this point the patient has failed the full compliment of conservative treatment and stating complete understanding of the risks/benefits/ anternatives wishes to proceed with surgical treatment.    Risk and benefits of surgery were reviewed.  Including, but not limited to, blood clots or pulmonary embolism, anesthesia risk, infection, fracture, skin/leg numbness, persistent pain/crepitance/popping/catching, failure of the implant, need for future surgeries, hematoma, possible nerve or blood vessel injury, need for transfusion, and potential risk of stroke,heart attack or death, among others.  The patient understands and wishes to proceed.     It was explained that if tissue has been repaired or reconstructed, there is also an increased chance of failure which may require further management.  Following the completion of the discussion, the patient expressed understanding of this planned course of care, all their questions were answered and consent will be obtained preoperatively.    Operative Plan: [employer] Total Knee Replacement with CORI and  a 3 day [facility]with [facility].  Patient is currently going to physical therapy and doing exercises.    Earnest Honeycutt, ADRYAN  10/04/2022

## 2022-10-07 PROBLEM — M17.12 PRIMARY OSTEOARTHRITIS OF LEFT KNEE: Status: ACTIVE | Noted: 2022-10-07

## 2022-10-17 ENCOUNTER — APPOINTMENT (OUTPATIENT)
Dept: WOMENS IMAGING | Facility: HOSPITAL | Age: 82
End: 2022-10-17

## 2022-10-17 PROCEDURE — 77067 SCR MAMMO BI INCL CAD: CPT | Performed by: RADIOLOGY

## 2022-10-17 PROCEDURE — 77063 BREAST TOMOSYNTHESIS BI: CPT | Performed by: RADIOLOGY

## 2022-11-14 ENCOUNTER — HOSPITAL ENCOUNTER (OUTPATIENT)
Dept: GENERAL RADIOLOGY | Facility: HOSPITAL | Age: 82
Discharge: HOME | End: 2022-11-14

## 2022-11-14 ENCOUNTER — PRE-ADMISSION TESTING (OUTPATIENT)
Dept: PREADMISSION TESTING | Facility: HOSPITAL | Age: 82
End: 2022-11-14

## 2022-11-14 VITALS
BODY MASS INDEX: 37.39 KG/M2 | HEIGHT: 63 IN | RESPIRATION RATE: 18 BRPM | WEIGHT: 211 LBS | DIASTOLIC BLOOD PRESSURE: 79 MMHG | TEMPERATURE: 98.3 F | OXYGEN SATURATION: 97 % | HEART RATE: 95 BPM | SYSTOLIC BLOOD PRESSURE: 122 MMHG

## 2022-11-14 LAB
ALBUMIN SERPL-MCNC: 4.3 G/DL (ref 3.5–5.2)
ALBUMIN/GLOB SERPL: 1.7 G/DL
ALP SERPL-CCNC: 84 U/L (ref 39–117)
ALT SERPL W P-5'-P-CCNC: 23 U/L (ref 1–33)
ANION GAP SERPL CALCULATED.3IONS-SCNC: 10.4 MMOL/L (ref 5–15)
AST SERPL-CCNC: 21 U/L (ref 1–32)
BILIRUB SERPL-MCNC: 0.6 MG/DL (ref 0–1.2)
BUN SERPL-MCNC: 21 MG/DL (ref 8–23)
BUN/CREAT SERPL: 22.6 (ref 7–25)
CALCIUM SPEC-SCNC: 9.9 MG/DL (ref 8.6–10.5)
CHLORIDE SERPL-SCNC: 99 MMOL/L (ref 98–107)
CO2 SERPL-SCNC: 26.6 MMOL/L (ref 22–29)
CREAT SERPL-MCNC: 0.93 MG/DL (ref 0.57–1)
DEPRECATED RDW RBC AUTO: 46.3 FL (ref 37–54)
EGFRCR SERPLBLD CKD-EPI 2021: 61.9 ML/MIN/1.73
ERYTHROCYTE [DISTWIDTH] IN BLOOD BY AUTOMATED COUNT: 13.7 % (ref 12.3–15.4)
GLOBULIN UR ELPH-MCNC: 2.5 GM/DL
GLUCOSE SERPL-MCNC: 128 MG/DL (ref 65–99)
HBA1C MFR BLD: 6.3 % (ref 4.8–5.6)
HCT VFR BLD AUTO: 38.4 % (ref 34–46.6)
HGB BLD-MCNC: 12.9 G/DL (ref 12–15.9)
INR PPP: 1.02 (ref 0.9–1.1)
MCH RBC QN AUTO: 30.8 PG (ref 26.6–33)
MCHC RBC AUTO-ENTMCNC: 33.6 G/DL (ref 31.5–35.7)
MCV RBC AUTO: 91.6 FL (ref 79–97)
PLATELET # BLD AUTO: 210 10*3/MM3 (ref 140–450)
PMV BLD AUTO: 9.4 FL (ref 6–12)
POTASSIUM SERPL-SCNC: 4 MMOL/L (ref 3.5–5.2)
PROT SERPL-MCNC: 6.8 G/DL (ref 6–8.5)
PROTHROMBIN TIME: 13.5 SECONDS (ref 11.7–14.2)
QT INTERVAL: 365 MS
RBC # BLD AUTO: 4.19 10*6/MM3 (ref 3.77–5.28)
SODIUM SERPL-SCNC: 136 MMOL/L (ref 136–145)
WBC NRBC COR # BLD: 6.53 10*3/MM3 (ref 3.4–10.8)

## 2022-11-14 PROCEDURE — 73560 X-RAY EXAM OF KNEE 1 OR 2: CPT

## 2022-11-14 PROCEDURE — 85610 PROTHROMBIN TIME: CPT

## 2022-11-14 PROCEDURE — 93010 ELECTROCARDIOGRAM REPORT: CPT | Performed by: INTERNAL MEDICINE

## 2022-11-14 PROCEDURE — 85027 COMPLETE CBC AUTOMATED: CPT

## 2022-11-14 PROCEDURE — 71046 X-RAY EXAM CHEST 2 VIEWS: CPT

## 2022-11-14 PROCEDURE — 93005 ELECTROCARDIOGRAM TRACING: CPT

## 2022-11-14 PROCEDURE — 80053 COMPREHEN METABOLIC PANEL: CPT

## 2022-11-14 PROCEDURE — 83036 HEMOGLOBIN GLYCOSYLATED A1C: CPT

## 2022-11-14 PROCEDURE — 36415 COLL VENOUS BLD VENIPUNCTURE: CPT

## 2022-11-14 RX ORDER — CHLORHEXIDINE GLUCONATE 500 MG/1
CLOTH TOPICAL TAKE AS DIRECTED
COMMUNITY
End: 2022-11-24 | Stop reason: HOSPADM

## 2022-11-14 ASSESSMENT — KOOS JR
KOOS JR SCORE: 57.14
KOOS JR SCORE: 12

## 2022-11-14 NOTE — DISCHARGE INSTRUCTIONS
Take the following medications the morning of surgery: LEVOTHYROXINE    ARRIVE AT 1 PM ON 11/22/22    If you are on prescription narcotic pain medication to control your pain you may also take that medication the morning of surgery.    General Instructions:  Do not eat solid food after midnight the night before surgery.  You may drink clear liquids day of surgery but must stop at least one hour before your hospital arrival time.  It is beneficial for you to have a clear drink that contains carbohydrates the day of surgery.  We suggest a 12 to 20 ounce bottle of Gatorade or Powerade for non-diabetic patients or a 12 to 20 ounce bottle of G2 or Powerade Zero for diabetic patients. (Pediatric patients, are not advised to drink a 12 to 20 ounce carbohydrate drink)    Clear liquids are liquids you can see through.  Nothing red in color.     Plain water                               Sports drinks  Sodas                                   Gelatin (Jell-O)  Fruit juices without pulp such as white grape juice and apple juice  Popsicles that contain no fruit or yogurt  Tea or coffee (no cream or milk added)  Gatorade / Powerade  G2 / Powerade Zero    Infants may have breast milk up to four hours before surgery.  Infants drinking formula may drink formula up to six hours before surgery.   Patients who avoid smoking, chewing tobacco and alcohol for 4 weeks prior to surgery have a reduced risk of post-operative complications.  Quit smoking as many days before surgery as you can.  Do not smoke, use chewing tobacco or drink alcohol the day of surgery.   If applicable bring your C-PAP/ BI-PAP machine.  Bring any papers given to you in the doctor’s office.  Wear clean comfortable clothes.  Do not wear contact lenses, false eyelashes or make-up.  Bring a case for your glasses.   Bring crutches or walker if applicable.  Remove all piercings.  Leave jewelry and any other valuables at home.  Hair extensions with metal clips must be  removed prior to surgery.  The Pre-Admission Testing nurse will instruct you to bring medications if unable to obtain an accurate list in Pre-Admission Testing.        If you were given a blood bank ID arm band remember to bring it with you the day of surgery.    Preventing a Surgical Site Infection:  For 2 to 3 days before surgery, avoid shaving with a razor because the razor can irritate skin and make it easier to develop an infection.    Any areas of open skin can increase the risk of a post-operative wound infection by allowing bacteria to enter and travel throughout the body.  Notify your surgeon if you have any skin wounds / rashes even if it is not near the expected surgical site.  The area will need assessed to determine if surgery should be delayed until it is healed.  The night prior to surgery shower using a fresh bar of anti-bacterial soap (such as Dial) and clean washcloth.  Sleep in a clean bed with clean clothing.  Do not allow pets to sleep with you.  Shower on the morning of surgery using a fresh bar of anti-bacterial soap (such as Dial) and clean washcloth.  Dry with a clean towel and dress in clean clothing.  Ask your surgeon if you will be receiving antibiotics prior to surgery.  Make sure you, your family, and all healthcare providers clean their hands with soap and water or an alcohol based hand  before caring for you or your wound.    Day of surgery:  Your arrival time is approximately two hours before your scheduled surgery time.  Upon arrival, a Pre-op nurse and Anesthesiologist will review your health history, obtain vital signs, and answer questions you may have.  The only belongings needed at this time will be a list of your home medications and if applicable your C-PAP/BI-PAP machine.  A Pre-op nurse will start an IV and you may receive medication in preparation for surgery, including something to help you relax.     Please be aware that surgery does come with discomfort.  We  want to make every effort to control your discomfort so please discuss any uncontrolled symptoms with your nurse.   Your doctor will most likely have prescribed pain medications.      If you are going home after surgery you will receive individualized written care instructions before being discharged.  A responsible adult must drive you to and from the hospital on the day of your surgery and stay with you for 24 hours.  Discharge prescriptions can be filled by the hospital pharmacy during regular pharmacy hours.  If you are having surgery late in the day/evening your prescription may be e-prescribed to your pharmacy.  Please verify your pharmacy hours or chose a 24 hour pharmacy to avoid not having access to your prescription because your pharmacy has closed for the day.    If you are staying overnight following surgery, you will be transported to your hospital room following the recovery period.  [facility] has all private rooms.    If you have any questions please call Pre-Admission Testing at [phone].  Deductibles and co-payments are collected on the day of service. Please be prepared to pay the required co-pay, deductible or deposit on the day of service as defined by your plan.    Call your surgeon immediately if you experience any of the following symptoms:  Sore Throat  Shortness of Breath or difficulty breathing  Cough  Chills  Body soreness or muscle pain  Headache  Fever  New loss of taste or smell  Do not arrive for your surgery ill.  Your procedure will need to be rescheduled to another time.  You will need to call your physician before the day of surgery to avoid any unnecessary exposure to hospital staff as well as other patients.       CHLORHEXIDINE CLOTH INSTRUCTIONS  The morning of surgery follow these instructions using the Chlorhexidine cloths you've been given.  These steps reduce bacteria on the body.  Do not use the cloths near your eyes, ears mouth, genitalia or on open  wounds.  Throw the cloths away after use but do not try to flush them down a toilet.      Open and remove one cloth at a time from the package.    Leave the cloth unfolded and begin the bathing.  Massage the skin with the cloths using gentle pressure to remove bacteria.  Do not scrub harshly.   Follow the steps below with one 2% CHG cloth per area (6 total cloths).  One cloth for neck, shoulders and chest.  One cloth for both arms, hands, fingers and underarms (do underarms last).  One cloth for the abdomen followed by groin.  One cloth for right leg and foot including between the toes.  One cloth for left leg and foot including between the toes.  The last cloth is to be used for the back of the neck, back and buttocks.    Allow the CHG to air dry 3 minutes on the skin which will give it time to work and decrease the chance of irritation.  The skin may feel sticky until it is dry.  Do not rinse with water or any other liquid or you will lose the beneficial effects of the CHG.  If mild skin irritation occurs, do rinse the skin to remove the CHG.  Report this to the nurse at time of admission.  Do not apply lotions, creams, ointments, deodorants or perfumes after using the clothes. Dress in clean clothes before coming to the hospital.

## 2022-11-22 ENCOUNTER — HOSPITAL ENCOUNTER (OUTPATIENT)
Facility: HOSPITAL | Age: 82
Discharge: NURSING, REHAB OR LONG-TERM CARE | End: 2022-11-24
Attending: ORTHOPAEDIC SURGERY | Admitting: ORTHOPAEDIC SURGERY

## 2022-11-22 ENCOUNTER — ANESTHESIA (OUTPATIENT)
Dept: PERIOP | Facility: HOSPITAL | Age: 82
End: 2022-11-22

## 2022-11-22 ENCOUNTER — APPOINTMENT (OUTPATIENT)
Dept: GENERAL RADIOLOGY | Facility: HOSPITAL | Age: 82
End: 2022-11-22

## 2022-11-22 ENCOUNTER — ANESTHESIA EVENT (OUTPATIENT)
Dept: PERIOP | Facility: HOSPITAL | Age: 82
End: 2022-11-22

## 2022-11-22 DIAGNOSIS — M17.12 PRIMARY OSTEOARTHRITIS OF LEFT KNEE: Primary | ICD-10-CM

## 2022-11-22 PROCEDURE — 25010000002 PROPOFOL 10 MG/ML EMULSION: Performed by: ANESTHESIOLOGY

## 2022-11-22 PROCEDURE — 76942 ECHO GUIDE FOR BIOPSY: CPT | Performed by: ORTHOPAEDIC SURGERY

## 2022-11-22 PROCEDURE — 25010000002 EPINEPHRINE 1 MG/ML SOLUTION 30 ML VIAL: Performed by: ORTHOPAEDIC SURGERY

## 2022-11-22 PROCEDURE — G0378 HOSPITAL OBSERVATION PER HR: HCPCS

## 2022-11-22 PROCEDURE — 25010000002 CEFAZOLIN IN DEXTROSE 2-4 GM/100ML-% SOLUTION: Performed by: ORTHOPAEDIC SURGERY

## 2022-11-22 PROCEDURE — C1776 JOINT DEVICE (IMPLANTABLE): HCPCS | Performed by: ORTHOPAEDIC SURGERY

## 2022-11-22 PROCEDURE — 25010000002 HYDROMORPHONE PER 4 MG: Performed by: ANESTHESIOLOGY

## 2022-11-22 PROCEDURE — 25010000002 ROPIVACAINE PER 1 MG: Performed by: ANESTHESIOLOGY

## 2022-11-22 PROCEDURE — 25010000002 ROPIVACAINE PER 1 MG: Performed by: ORTHOPAEDIC SURGERY

## 2022-11-22 PROCEDURE — 63710000001 DIAZEPAM 5 MG TABLET: Performed by: ORTHOPAEDIC SURGERY

## 2022-11-22 PROCEDURE — 25010000002 MORPHINE PER 10 MG: Performed by: ORTHOPAEDIC SURGERY

## 2022-11-22 PROCEDURE — A9270 NON-COVERED ITEM OR SERVICE: HCPCS | Performed by: ORTHOPAEDIC SURGERY

## 2022-11-22 PROCEDURE — 25010000002 MIDAZOLAM PER 1 MG: Performed by: ANESTHESIOLOGY

## 2022-11-22 PROCEDURE — 25010000002 ONDANSETRON PER 1 MG: Performed by: ANESTHESIOLOGY

## 2022-11-22 PROCEDURE — 25010000002 KETOROLAC TROMETHAMINE PER 15 MG: Performed by: ORTHOPAEDIC SURGERY

## 2022-11-22 PROCEDURE — C1713 ANCHOR/SCREW BN/BN,TIS/BN: HCPCS | Performed by: ORTHOPAEDIC SURGERY

## 2022-11-22 PROCEDURE — 25010000002 DEXAMETHASONE PER 1 MG: Performed by: ANESTHESIOLOGY

## 2022-11-22 PROCEDURE — 25010000002 NEOSTIGMINE 5 MG/10ML SOLUTION: Performed by: ANESTHESIOLOGY

## 2022-11-22 PROCEDURE — 25010000002 HYDROMORPHONE 1 MG/ML SOLUTION: Performed by: ANESTHESIOLOGY

## 2022-11-22 PROCEDURE — 25010000002 FENTANYL CITRATE (PF) 50 MCG/ML SOLUTION: Performed by: ANESTHESIOLOGY

## 2022-11-22 PROCEDURE — 63710000001 ATORVASTATIN 20 MG TABLET: Performed by: ORTHOPAEDIC SURGERY

## 2022-11-22 PROCEDURE — 25010000002 PHENYLEPHRINE 10 MG/ML SOLUTION: Performed by: ANESTHESIOLOGY

## 2022-11-22 PROCEDURE — 73560 X-RAY EXAM OF KNEE 1 OR 2: CPT

## 2022-11-22 DEVICE — CAP TOTL KN CMT PRIMARY: Type: IMPLANTABLE DEVICE | Site: KNEE | Status: FUNCTIONAL

## 2022-11-22 DEVICE — COMP FEM/KN VANGUARD INTLK CR 65MM NS LT: Type: IMPLANTABLE DEVICE | Site: KNEE | Status: FUNCTIONAL

## 2022-11-22 DEVICE — TRY TIB INTERLOK PRI 71MM: Type: IMPLANTABLE DEVICE | Site: KNEE | Status: FUNCTIONAL

## 2022-11-22 DEVICE — CMT BONE R 1X40: Type: IMPLANTABLE DEVICE | Site: KNEE | Status: FUNCTIONAL

## 2022-11-22 DEVICE — STEM TIB PRI FINN 46X40MM: Type: IMPLANTABLE DEVICE | Site: KNEE | Status: FUNCTIONAL

## 2022-11-22 DEVICE — PAT 3PEG THN 28X6.2 28MM: Type: IMPLANTABLE DEVICE | Site: KNEE | Status: FUNCTIONAL

## 2022-11-22 DEVICE — IMPLANTABLE DEVICE
Type: IMPLANTABLE DEVICE | Site: KNEE | Status: FUNCTIONAL
Brand: VANGUARD® KNEE SYSTEM

## 2022-11-22 RX ORDER — SODIUM CHLORIDE 450 MG/100ML
100 INJECTION, SOLUTION INTRAVENOUS CONTINUOUS
Status: DISCONTINUED | OUTPATIENT
Start: 2022-11-22 | End: 2022-11-24 | Stop reason: HOSPADM

## 2022-11-22 RX ORDER — MAGNESIUM HYDROXIDE 1200 MG/15ML
LIQUID ORAL AS NEEDED
Status: DISCONTINUED | OUTPATIENT
Start: 2022-11-22 | End: 2022-11-22 | Stop reason: HOSPADM

## 2022-11-22 RX ORDER — SODIUM CHLORIDE 9 MG/ML
40 INJECTION, SOLUTION INTRAVENOUS AS NEEDED
Status: DISCONTINUED | OUTPATIENT
Start: 2022-11-22 | End: 2022-11-24 | Stop reason: HOSPADM

## 2022-11-22 RX ORDER — DIAZEPAM 5 MG/1
5 TABLET ORAL 2 TIMES DAILY PRN
Status: DISCONTINUED | OUTPATIENT
Start: 2022-11-22 | End: 2022-11-24 | Stop reason: HOSPADM

## 2022-11-22 RX ORDER — LIDOCAINE HYDROCHLORIDE 10 MG/ML
0.5 INJECTION, SOLUTION EPIDURAL; INFILTRATION; INTRACAUDAL; PERINEURAL ONCE AS NEEDED
Status: DISCONTINUED | OUTPATIENT
Start: 2022-11-22 | End: 2022-11-22 | Stop reason: HOSPADM

## 2022-11-22 RX ORDER — LISINOPRIL 5 MG/1
5 TABLET ORAL
Status: DISCONTINUED | OUTPATIENT
Start: 2022-11-22 | End: 2022-11-24 | Stop reason: HOSPADM

## 2022-11-22 RX ORDER — CLINDAMYCIN PHOSPHATE 900 MG/50ML
900 INJECTION INTRAVENOUS EVERY 8 HOURS
Status: COMPLETED | OUTPATIENT
Start: 2022-11-22 | End: 2022-11-23

## 2022-11-22 RX ORDER — DIPHENHYDRAMINE HYDROCHLORIDE 50 MG/ML
12.5 INJECTION INTRAMUSCULAR; INTRAVENOUS EVERY 6 HOURS PRN
Status: DISCONTINUED | OUTPATIENT
Start: 2022-11-22 | End: 2022-11-24 | Stop reason: HOSPADM

## 2022-11-22 RX ORDER — PANTOPRAZOLE SODIUM 40 MG/1
40 TABLET, DELAYED RELEASE ORAL
Status: DISCONTINUED | OUTPATIENT
Start: 2022-11-22 | End: 2022-11-24 | Stop reason: HOSPADM

## 2022-11-22 RX ORDER — SODIUM CHLORIDE 0.9 % (FLUSH) 0.9 %
1-10 SYRINGE (ML) INJECTION AS NEEDED
Status: DISCONTINUED | OUTPATIENT
Start: 2022-11-22 | End: 2022-11-24 | Stop reason: HOSPADM

## 2022-11-22 RX ORDER — SODIUM CHLORIDE, SODIUM LACTATE, POTASSIUM CHLORIDE, CALCIUM CHLORIDE 600; 310; 30; 20 MG/100ML; MG/100ML; MG/100ML; MG/100ML
9 INJECTION, SOLUTION INTRAVENOUS CONTINUOUS
Status: DISCONTINUED | OUTPATIENT
Start: 2022-11-22 | End: 2022-11-24 | Stop reason: HOSPADM

## 2022-11-22 RX ORDER — FERROUS SULFATE 325(65) MG
325 TABLET ORAL
Status: DISCONTINUED | OUTPATIENT
Start: 2022-11-23 | End: 2022-11-24 | Stop reason: HOSPADM

## 2022-11-22 RX ORDER — CHOLECALCIFEROL (VITAMIN D3) 125 MCG
5 CAPSULE ORAL NIGHTLY PRN
Status: DISCONTINUED | OUTPATIENT
Start: 2022-11-22 | End: 2022-11-24 | Stop reason: HOSPADM

## 2022-11-22 RX ORDER — ROPIVACAINE HYDROCHLORIDE 5 MG/ML
INJECTION, SOLUTION EPIDURAL; INFILTRATION; PERINEURAL
Status: COMPLETED | OUTPATIENT
Start: 2022-11-22 | End: 2022-11-22

## 2022-11-22 RX ORDER — ASPIRIN 325 MG
325 TABLET, DELAYED RELEASE (ENTERIC COATED) ORAL DAILY
Status: DISCONTINUED | OUTPATIENT
Start: 2022-11-23 | End: 2022-11-24 | Stop reason: HOSPADM

## 2022-11-22 RX ORDER — ONDANSETRON 4 MG/1
4 TABLET, FILM COATED ORAL EVERY 6 HOURS PRN
Status: DISCONTINUED | OUTPATIENT
Start: 2022-11-22 | End: 2022-11-24 | Stop reason: HOSPADM

## 2022-11-22 RX ORDER — ROCURONIUM BROMIDE 10 MG/ML
INJECTION, SOLUTION INTRAVENOUS AS NEEDED
Status: DISCONTINUED | OUTPATIENT
Start: 2022-11-22 | End: 2022-11-22 | Stop reason: SURG

## 2022-11-22 RX ORDER — KETOROLAC TROMETHAMINE 15 MG/ML
15 INJECTION, SOLUTION INTRAMUSCULAR; INTRAVENOUS EVERY 6 HOURS PRN
Status: DISCONTINUED | OUTPATIENT
Start: 2022-11-22 | End: 2022-11-24 | Stop reason: HOSPADM

## 2022-11-22 RX ORDER — DEXAMETHASONE SODIUM PHOSPHATE 4 MG/ML
INJECTION, SOLUTION INTRA-ARTICULAR; INTRALESIONAL; INTRAMUSCULAR; INTRAVENOUS; SOFT TISSUE
Status: COMPLETED | OUTPATIENT
Start: 2022-11-22 | End: 2022-11-22

## 2022-11-22 RX ORDER — ONDANSETRON 2 MG/ML
4 INJECTION INTRAMUSCULAR; INTRAVENOUS EVERY 6 HOURS PRN
Status: DISCONTINUED | OUTPATIENT
Start: 2022-11-22 | End: 2022-11-24 | Stop reason: HOSPADM

## 2022-11-22 RX ORDER — ASPIRIN 325 MG
325 TABLET ORAL DAILY
Qty: 45 TABLET | Refills: 0 | Status: SHIPPED | OUTPATIENT
Start: 2022-11-22 | End: 2022-11-24 | Stop reason: HOSPADM

## 2022-11-22 RX ORDER — GLYCOPYRROLATE 0.2 MG/ML
INJECTION INTRAMUSCULAR; INTRAVENOUS AS NEEDED
Status: DISCONTINUED | OUTPATIENT
Start: 2022-11-22 | End: 2022-11-22 | Stop reason: SURG

## 2022-11-22 RX ORDER — ACETAMINOPHEN 325 MG/1
325 TABLET ORAL EVERY 4 HOURS PRN
Status: DISCONTINUED | OUTPATIENT
Start: 2022-11-22 | End: 2022-11-24 | Stop reason: HOSPADM

## 2022-11-22 RX ORDER — SODIUM CHLORIDE 9 MG/ML
40 INJECTION, SOLUTION INTRAVENOUS AS NEEDED
Status: DISCONTINUED | OUTPATIENT
Start: 2022-11-22 | End: 2022-11-22 | Stop reason: HOSPADM

## 2022-11-22 RX ORDER — OXYCODONE HYDROCHLORIDE AND ACETAMINOPHEN 5; 325 MG/1; MG/1
1-2 TABLET ORAL EVERY 4 HOURS PRN
Qty: 60 TABLET | Refills: 0 | Status: SHIPPED | OUTPATIENT
Start: 2022-11-22

## 2022-11-22 RX ORDER — SODIUM CHLORIDE 0.9 % (FLUSH) 0.9 %
10 SYRINGE (ML) INJECTION AS NEEDED
Status: DISCONTINUED | OUTPATIENT
Start: 2022-11-22 | End: 2022-11-22 | Stop reason: HOSPADM

## 2022-11-22 RX ORDER — ACETAMINOPHEN 500 MG
500 TABLET ORAL ONCE
Status: COMPLETED | OUTPATIENT
Start: 2022-11-22 | End: 2022-11-22

## 2022-11-22 RX ORDER — LIDOCAINE HYDROCHLORIDE 20 MG/ML
INJECTION, SOLUTION INFILTRATION; PERINEURAL AS NEEDED
Status: DISCONTINUED | OUTPATIENT
Start: 2022-11-22 | End: 2022-11-22 | Stop reason: SURG

## 2022-11-22 RX ORDER — MIDAZOLAM HYDROCHLORIDE 1 MG/ML
1 INJECTION INTRAMUSCULAR; INTRAVENOUS
Status: DISCONTINUED | OUTPATIENT
Start: 2022-11-22 | End: 2022-11-22 | Stop reason: HOSPADM

## 2022-11-22 RX ORDER — NEOSTIGMINE METHYLSULFATE 0.5 MG/ML
INJECTION, SOLUTION INTRAVENOUS AS NEEDED
Status: DISCONTINUED | OUTPATIENT
Start: 2022-11-22 | End: 2022-11-22 | Stop reason: SURG

## 2022-11-22 RX ORDER — CEFAZOLIN SODIUM 2 G/100ML
2 INJECTION, SOLUTION INTRAVENOUS ONCE
Status: COMPLETED | OUTPATIENT
Start: 2022-11-22 | End: 2022-11-22

## 2022-11-22 RX ORDER — ONDANSETRON 2 MG/ML
4 INJECTION INTRAMUSCULAR; INTRAVENOUS ONCE AS NEEDED
Status: DISCONTINUED | OUTPATIENT
Start: 2022-11-22 | End: 2022-11-22 | Stop reason: HOSPADM

## 2022-11-22 RX ORDER — LABETALOL HYDROCHLORIDE 5 MG/ML
INJECTION, SOLUTION INTRAVENOUS AS NEEDED
Status: DISCONTINUED | OUTPATIENT
Start: 2022-11-22 | End: 2022-11-22 | Stop reason: SURG

## 2022-11-22 RX ORDER — ATORVASTATIN CALCIUM 20 MG/1
10 TABLET, FILM COATED ORAL DAILY
Status: DISCONTINUED | OUTPATIENT
Start: 2022-11-22 | End: 2022-11-24 | Stop reason: HOSPADM

## 2022-11-22 RX ORDER — PROMETHAZINE HYDROCHLORIDE 12.5 MG/1
12.5 TABLET ORAL EVERY 6 HOURS PRN
Status: DISCONTINUED | OUTPATIENT
Start: 2022-11-22 | End: 2022-11-24 | Stop reason: HOSPADM

## 2022-11-22 RX ORDER — CELECOXIB 200 MG/1
200 CAPSULE ORAL ONCE
Status: COMPLETED | OUTPATIENT
Start: 2022-11-22 | End: 2022-11-22

## 2022-11-22 RX ORDER — DOCUSATE SODIUM 250 MG
250 CAPSULE ORAL 2 TIMES DAILY PRN
Qty: 30 CAPSULE | Refills: 1 | Status: SHIPPED | OUTPATIENT
Start: 2022-11-22 | End: 2022-11-24 | Stop reason: HOSPADM

## 2022-11-22 RX ORDER — POVIDONE-IODINE 10 MG/ML
1 SOLUTION TOPICAL AS NEEDED
Status: DISCONTINUED | OUTPATIENT
Start: 2022-11-22 | End: 2022-11-22 | Stop reason: HOSPADM

## 2022-11-22 RX ORDER — NALOXONE HCL 0.4 MG/ML
0.4 VIAL (ML) INJECTION
Status: DISCONTINUED | OUTPATIENT
Start: 2022-11-22 | End: 2022-11-24 | Stop reason: HOSPADM

## 2022-11-22 RX ORDER — FENTANYL CITRATE 50 UG/ML
25 INJECTION, SOLUTION INTRAMUSCULAR; INTRAVENOUS
Status: DISCONTINUED | OUTPATIENT
Start: 2022-11-22 | End: 2022-11-22 | Stop reason: HOSPADM

## 2022-11-22 RX ORDER — DOCUSATE SODIUM 250 MG
250 CAPSULE ORAL 2 TIMES DAILY PRN
Qty: 30 CAPSULE | Refills: 1 | Status: SHIPPED | OUTPATIENT
Start: 2022-11-22 | End: 2022-11-24 | Stop reason: SDUPTHER

## 2022-11-22 RX ORDER — ONDANSETRON 2 MG/ML
INJECTION INTRAMUSCULAR; INTRAVENOUS AS NEEDED
Status: DISCONTINUED | OUTPATIENT
Start: 2022-11-22 | End: 2022-11-22 | Stop reason: SURG

## 2022-11-22 RX ORDER — OXYCODONE HYDROCHLORIDE AND ACETAMINOPHEN 5; 325 MG/1; MG/1
2 TABLET ORAL EVERY 4 HOURS PRN
Status: DISCONTINUED | OUTPATIENT
Start: 2022-11-22 | End: 2022-11-24 | Stop reason: HOSPADM

## 2022-11-22 RX ORDER — SODIUM CHLORIDE 0.9 % (FLUSH) 0.9 %
10 SYRINGE (ML) INJECTION EVERY 12 HOURS SCHEDULED
Status: DISCONTINUED | OUTPATIENT
Start: 2022-11-22 | End: 2022-11-22 | Stop reason: HOSPADM

## 2022-11-22 RX ORDER — ACETAMINOPHEN 500 MG
1000 TABLET ORAL ONCE
Status: COMPLETED | OUTPATIENT
Start: 2022-11-22 | End: 2022-11-22

## 2022-11-22 RX ORDER — TRANEXAMIC ACID 100 MG/ML
INJECTION, SOLUTION INTRAVENOUS AS NEEDED
Status: DISCONTINUED | OUTPATIENT
Start: 2022-11-22 | End: 2022-11-22 | Stop reason: SURG

## 2022-11-22 RX ORDER — ASPIRIN 325 MG
325 TABLET ORAL DAILY
Qty: 45 TABLET | Refills: 0 | Status: SHIPPED | OUTPATIENT
Start: 2022-11-22 | End: 2022-11-24 | Stop reason: SDUPTHER

## 2022-11-22 RX ORDER — CELECOXIB 200 MG/1
200 CAPSULE ORAL DAILY
Status: DISCONTINUED | OUTPATIENT
Start: 2022-11-22 | End: 2022-11-24 | Stop reason: HOSPADM

## 2022-11-22 RX ORDER — HYDROMORPHONE HYDROCHLORIDE 1 MG/ML
0.25 INJECTION, SOLUTION INTRAMUSCULAR; INTRAVENOUS; SUBCUTANEOUS
Status: DISCONTINUED | OUTPATIENT
Start: 2022-11-22 | End: 2022-11-22 | Stop reason: HOSPADM

## 2022-11-22 RX ORDER — SODIUM CHLORIDE 0.9 % (FLUSH) 0.9 %
10 SYRINGE (ML) INJECTION EVERY 12 HOURS SCHEDULED
Status: DISCONTINUED | OUTPATIENT
Start: 2022-11-22 | End: 2022-11-24 | Stop reason: HOSPADM

## 2022-11-22 RX ORDER — DOCUSATE SODIUM 100 MG/1
100 CAPSULE, LIQUID FILLED ORAL 2 TIMES DAILY PRN
Status: DISCONTINUED | OUTPATIENT
Start: 2022-11-22 | End: 2022-11-24 | Stop reason: HOSPADM

## 2022-11-22 RX ORDER — PHENYLEPHRINE HYDROCHLORIDE 10 MG/ML
INJECTION INTRAVENOUS AS NEEDED
Status: DISCONTINUED | OUTPATIENT
Start: 2022-11-22 | End: 2022-11-22 | Stop reason: SURG

## 2022-11-22 RX ORDER — OXYCODONE HYDROCHLORIDE AND ACETAMINOPHEN 5; 325 MG/1; MG/1
1-2 TABLET ORAL EVERY 4 HOURS PRN
Qty: 60 TABLET | Refills: 0 | Status: SHIPPED | OUTPATIENT
Start: 2022-11-22 | End: 2022-11-24 | Stop reason: SDUPTHER

## 2022-11-22 RX ORDER — OXYCODONE HYDROCHLORIDE AND ACETAMINOPHEN 5; 325 MG/1; MG/1
1 TABLET ORAL EVERY 4 HOURS PRN
Status: DISCONTINUED | OUTPATIENT
Start: 2022-11-22 | End: 2022-11-24 | Stop reason: HOSPADM

## 2022-11-22 RX ORDER — DIPHENHYDRAMINE HCL 25 MG
25 CAPSULE ORAL EVERY 6 HOURS PRN
Status: DISCONTINUED | OUTPATIENT
Start: 2022-11-22 | End: 2022-11-24 | Stop reason: HOSPADM

## 2022-11-22 RX ORDER — PROPOFOL 10 MG/ML
VIAL (ML) INTRAVENOUS AS NEEDED
Status: DISCONTINUED | OUTPATIENT
Start: 2022-11-22 | End: 2022-11-22 | Stop reason: SURG

## 2022-11-22 RX ORDER — FENTANYL CITRATE 50 UG/ML
50 INJECTION, SOLUTION INTRAMUSCULAR; INTRAVENOUS
Status: DISCONTINUED | OUTPATIENT
Start: 2022-11-22 | End: 2022-11-22 | Stop reason: HOSPADM

## 2022-11-22 RX ORDER — MORPHINE SULFATE 2 MG/ML
4 INJECTION, SOLUTION INTRAMUSCULAR; INTRAVENOUS
Status: DISCONTINUED | OUTPATIENT
Start: 2022-11-22 | End: 2022-11-24 | Stop reason: HOSPADM

## 2022-11-22 RX ADMIN — TRANEXAMIC ACID 1000 MG: 1 INJECTION, SOLUTION INTRAVENOUS at 15:45

## 2022-11-22 RX ADMIN — DEXAMETHASONE SODIUM PHOSPHATE 8 MG: 4 INJECTION, SOLUTION INTRAMUSCULAR; INTRAVENOUS at 16:12

## 2022-11-22 RX ADMIN — FENTANYL CITRATE 50 MCG: 50 INJECTION, SOLUTION INTRAMUSCULAR; INTRAVENOUS at 15:55

## 2022-11-22 RX ADMIN — FENTANYL CITRATE 50 MCG: 50 INJECTION, SOLUTION INTRAMUSCULAR; INTRAVENOUS at 18:00

## 2022-11-22 RX ADMIN — LABETALOL HYDROCHLORIDE 10 MG: 5 INJECTION, SOLUTION INTRAVENOUS at 15:55

## 2022-11-22 RX ADMIN — ONDANSETRON 4 MG: 2 INJECTION INTRAMUSCULAR; INTRAVENOUS at 16:35

## 2022-11-22 RX ADMIN — CLINDAMYCIN PHOSPHATE 900 MG: 900 INJECTION, SOLUTION INTRAVENOUS at 22:34

## 2022-11-22 RX ADMIN — ROPIVACAINE HYDROCHLORIDE 30 ML: 5 INJECTION, SOLUTION EPIDURAL; INFILTRATION; PERINEURAL at 14:17

## 2022-11-22 RX ADMIN — LABETALOL HYDROCHLORIDE 5 MG: 5 INJECTION, SOLUTION INTRAVENOUS at 15:59

## 2022-11-22 RX ADMIN — CEFAZOLIN SODIUM 2 G: 2 INJECTION, SOLUTION INTRAVENOUS at 15:13

## 2022-11-22 RX ADMIN — FENTANYL CITRATE 25 MCG: 50 INJECTION, SOLUTION INTRAMUSCULAR; INTRAVENOUS at 14:10

## 2022-11-22 RX ADMIN — HYDROMORPHONE HYDROCHLORIDE 0.25 MG: 1 INJECTION, SOLUTION INTRAMUSCULAR; INTRAVENOUS; SUBCUTANEOUS at 17:31

## 2022-11-22 RX ADMIN — FENTANYL CITRATE 50 MCG: 50 INJECTION, SOLUTION INTRAMUSCULAR; INTRAVENOUS at 15:51

## 2022-11-22 RX ADMIN — ROCURONIUM BROMIDE 30 MG: 10 INJECTION, SOLUTION INTRAVENOUS at 15:30

## 2022-11-22 RX ADMIN — DEXAMETHASONE SODIUM PHOSPHATE 4 MG: 4 INJECTION, SOLUTION INTRAMUSCULAR; INTRAVENOUS at 14:17

## 2022-11-22 RX ADMIN — Medication 10 ML: at 20:27

## 2022-11-22 RX ADMIN — SODIUM CHLORIDE, POTASSIUM CHLORIDE, SODIUM LACTATE AND CALCIUM CHLORIDE 9 ML/HR: 600; 310; 30; 20 INJECTION, SOLUTION INTRAVENOUS at 13:28

## 2022-11-22 RX ADMIN — FENTANYL CITRATE 50 MCG: 50 INJECTION, SOLUTION INTRAMUSCULAR; INTRAVENOUS at 17:48

## 2022-11-22 RX ADMIN — GLYCOPYRROLATE 0.4 MCG: 1 INJECTION INTRAMUSCULAR; INTRAVENOUS at 17:16

## 2022-11-22 RX ADMIN — ACETAMINOPHEN 500 MG: 500 TABLET ORAL at 13:28

## 2022-11-22 RX ADMIN — PROPOFOL 100 MG: 10 INJECTION, EMULSION INTRAVENOUS at 15:30

## 2022-11-22 RX ADMIN — DIAZEPAM 5 MG: 5 TABLET ORAL at 20:34

## 2022-11-22 RX ADMIN — Medication 10 ML: at 13:28

## 2022-11-22 RX ADMIN — ATORVASTATIN CALCIUM 10 MG: 20 TABLET, FILM COATED ORAL at 20:26

## 2022-11-22 RX ADMIN — ACETAMINOPHEN 1000 MG: 500 TABLET ORAL at 13:28

## 2022-11-22 RX ADMIN — LIDOCAINE HYDROCHLORIDE 40 MG: 20 INJECTION, SOLUTION INFILTRATION; PERINEURAL at 15:29

## 2022-11-22 RX ADMIN — HYDROMORPHONE HYDROCHLORIDE 0.5 MG: 1 INJECTION, SOLUTION INTRAMUSCULAR; INTRAVENOUS; SUBCUTANEOUS at 15:59

## 2022-11-22 RX ADMIN — SODIUM CHLORIDE 100 ML/HR: 4.5 INJECTION, SOLUTION INTRAVENOUS at 20:27

## 2022-11-22 RX ADMIN — MIDAZOLAM 1 MG: 1 INJECTION INTRAMUSCULAR; INTRAVENOUS at 14:43

## 2022-11-22 RX ADMIN — POVIDONE-IODINE 1 EACH: 10 SOLUTION TOPICAL at 13:32

## 2022-11-22 RX ADMIN — PHENYLEPHRINE HYDROCHLORIDE 200 MCG: 10 INJECTION INTRAVENOUS at 17:07

## 2022-11-22 RX ADMIN — HYDROMORPHONE HYDROCHLORIDE 0.25 MG: 1 INJECTION, SOLUTION INTRAMUSCULAR; INTRAVENOUS; SUBCUTANEOUS at 17:43

## 2022-11-22 RX ADMIN — CELECOXIB 200 MG: 200 CAPSULE ORAL at 13:28

## 2022-11-22 RX ADMIN — NEOSTIGMINE METHYLSULFATE 3 MG: 0.5 INJECTION INTRAVENOUS at 17:16

## 2022-11-22 NOTE — ANESTHESIA PROCEDURE NOTES
Airway  Urgency: elective    Date/Time: 11/22/2022 3:32 PM    General Information and Staff    Patient location during procedure: OR  Anesthesiologist: Adam Fitzgerald MD    Indications and Patient Condition  Indications for airway management: airway protection    Preoxygenated: yes  MILS maintained throughout  Mask difficulty assessment: 1 - vent by mask    Final Airway Details  Final airway type: endotracheal airway      Successful airway: ETT  Cuffed: yes   Successful intubation technique: video laryngoscopy and direct laryngoscopy  Facilitating devices/methods: intubating stylet  Endotracheal tube insertion site: oral  Blade: Rafa  Blade size: 3  ETT size (mm): 7.0  Cormack-Lehane Classification: grade IIb - view of arytenoids or posterior of glottis only  Placement verified by: chest auscultation   Measured from: lips  ETT/EBT  to lips (cm): 21  Number of attempts at approach: 1  Assessment: lips, teeth, and gum same as pre-op and atraumatic intubation

## 2022-11-22 NOTE — ANESTHESIA POSTPROCEDURE EVALUATION
Patient: Madisyn Plascencia    Procedure Summary     Date: 11/22/22 Room / Location:  [facility]  [facility]    Anesthesia Start: 1522 Anesthesia Stop: 1733    Procedure: LEFT TOTAL KNEE ARTHROPLASTY (Left: Knee) Diagnosis:     Surgeons: Antonio Riggins MD Provider: Adam Fitzgerald MD    Anesthesia Type: general ASA Status: 3          Anesthesia Type: general    Vitals  Vitals Value Taken Time   /59 11/22/22 1800   Temp 36.6 °C (97.8 °F) 11/22/22 1730   Pulse 69 11/22/22 1804   Resp 18 11/22/22 1750   SpO2 98 % 11/22/22 1804   Vitals shown include unvalidated device data.        Post Anesthesia Care and Evaluation    Patient location during evaluation: bedside  Patient participation: complete - patient participated  Level of consciousness: awake and alert  Pain management: adequate    Airway patency: patent  Anesthetic complications: No anesthetic complications    Cardiovascular status: acceptable  Respiratory status: acceptable  Hydration status: acceptable    Comments: /64 (BP Location: Left arm, Patient Position: Lying)   Pulse 67   Temp 36.6 °C (97.8 °F) (Oral)   Resp 18   LMP  (LMP Unknown)   SpO2 99%

## 2022-11-22 NOTE — ANESTHESIA PREPROCEDURE EVALUATION
Anesthesia Evaluation                  Airway   Mallampati: IV  TM distance: >3 FB  Neck ROM: full  Difficult intubation highly probable  Dental - normal exam     Pulmonary - negative pulmonary ROS   (-) wheezes  Cardiovascular     ECG reviewed  Rhythm: regular    (+) hypertension, hyperlipidemia,   (-) murmur      Neuro/Psych  GI/Hepatic/Renal/Endo    (+)  GERD,  thyroid problem     Musculoskeletal     Abdominal    Substance History      OB/GYN          Other                        Anesthesia Plan    ASA 3     general     (  D/W R&B of GA including but not limited to: heart, lung, liver, kidney, neurologic problems, positioning injuries, dental damage, corneal abrasion and TMJ.  .)  intravenous induction           CODE STATUS:

## 2022-11-22 NOTE — ANESTHESIA PROCEDURE NOTES
Peripheral Block    Pre-sedation assessment completed: 11/22/2022 2:14 PM    Patient reassessed immediately prior to procedure    Patient location during procedure: pre-op  Start time: 11/22/2022 2:17 PM  Stop time: 11/22/2022 2:24 PM  Reason for block: at surgeon's request and post-op pain management  Performed by  Anesthesiologist: Hugo Kumar MD  Preanesthetic Checklist  Completed: patient identified, IV checked, site marked, risks and benefits discussed, surgical consent, monitors and equipment checked, pre-op evaluation and timeout performed  Prep:  Sterile barriers:gloves  Prep: ChloraPrep  Patient monitoring: blood pressure monitoring, continuous pulse oximetry and EKG  Procedure    Sedation: yes    Guidance:ultrasound guided    ULTRASOUND INTERPRETATION.  Using ultrasound guidance a 22 G gauge needle was placed in close proximity to the femoral nerve, at which point, under ultrasound guidance anesthetic was injected in the area of the nerve and spread of the anesthesia was seen on ultrasound in close proximity thereto.  There were no abnormalities seen on ultrasound; a digital image was taken; and the patient tolerated the procedure with no complications. Images:still images obtained    Laterality:left  Block Type:adductor canal block  Injection Technique:single-shot  Needle Type:short-bevel  Needle Gauge:22 G      Medications Used: ropivacaine (NAROPIN) 0.5 % injection - Injection   30 mL - 11/22/2022 2:17:00 PM  dexamethasone (DECADRON) injection - Injection   4 mg - 11/22/2022 2:17:00 PM      Medications  Comment:.    Post Assessment  Injection Assessment: negative aspiration for heme, no paresthesia on injection and incremental injection  Patient Tolerance:comfortable throughout block  Complications:no

## 2022-11-23 LAB
ANION GAP SERPL CALCULATED.3IONS-SCNC: 11.7 MMOL/L (ref 5–15)
BUN SERPL-MCNC: 18 MG/DL (ref 8–23)
BUN/CREAT SERPL: 19.4 (ref 7–25)
CALCIUM SPEC-SCNC: 8.8 MG/DL (ref 8.6–10.5)
CHLORIDE SERPL-SCNC: 99 MMOL/L (ref 98–107)
CO2 SERPL-SCNC: 20.3 MMOL/L (ref 22–29)
CREAT SERPL-MCNC: 0.93 MG/DL (ref 0.57–1)
DEPRECATED RDW RBC AUTO: 45.7 FL (ref 37–54)
EGFRCR SERPLBLD CKD-EPI 2021: 61.9 ML/MIN/1.73
ERYTHROCYTE [DISTWIDTH] IN BLOOD BY AUTOMATED COUNT: 13.6 % (ref 12.3–15.4)
GLUCOSE SERPL-MCNC: 175 MG/DL (ref 65–99)
HCT VFR BLD AUTO: 31.5 % (ref 34–46.6)
HGB BLD-MCNC: 10.6 G/DL (ref 12–15.9)
MCH RBC QN AUTO: 31.5 PG (ref 26.6–33)
MCHC RBC AUTO-ENTMCNC: 33.7 G/DL (ref 31.5–35.7)
MCV RBC AUTO: 93.8 FL (ref 79–97)
PLATELET # BLD AUTO: 187 10*3/MM3 (ref 140–450)
PMV BLD AUTO: 9.4 FL (ref 6–12)
POTASSIUM SERPL-SCNC: 4.9 MMOL/L (ref 3.5–5.2)
RBC # BLD AUTO: 3.36 10*6/MM3 (ref 3.77–5.28)
SODIUM SERPL-SCNC: 131 MMOL/L (ref 136–145)
WBC NRBC COR # BLD: 13.73 10*3/MM3 (ref 3.4–10.8)

## 2022-11-23 PROCEDURE — G0378 HOSPITAL OBSERVATION PER HR: HCPCS

## 2022-11-23 PROCEDURE — A9270 NON-COVERED ITEM OR SERVICE: HCPCS | Performed by: ORTHOPAEDIC SURGERY

## 2022-11-23 PROCEDURE — 63710000001 DOCUSATE SODIUM 100 MG CAPSULE: Performed by: ORTHOPAEDIC SURGERY

## 2022-11-23 PROCEDURE — 63710000001 OXYCODONE-ACETAMINOPHEN 5-325 MG TABLET: Performed by: ORTHOPAEDIC SURGERY

## 2022-11-23 PROCEDURE — 63710000001 FERROUS SULFATE 325 (65 FE) MG TABLET: Performed by: ORTHOPAEDIC SURGERY

## 2022-11-23 PROCEDURE — 63710000001 ATORVASTATIN 20 MG TABLET: Performed by: ORTHOPAEDIC SURGERY

## 2022-11-23 PROCEDURE — 97530 THERAPEUTIC ACTIVITIES: CPT

## 2022-11-23 PROCEDURE — 63710000001 PANTOPRAZOLE 40 MG TABLET DELAYED-RELEASE: Performed by: ORTHOPAEDIC SURGERY

## 2022-11-23 PROCEDURE — 63710000001 MELATONIN 5 MG TABLET: Performed by: ORTHOPAEDIC SURGERY

## 2022-11-23 PROCEDURE — 63710000001 ASPIRIN EC 325 MG TABLET DELAYED-RELEASE: Performed by: ORTHOPAEDIC SURGERY

## 2022-11-23 PROCEDURE — 85027 COMPLETE CBC AUTOMATED: CPT | Performed by: ORTHOPAEDIC SURGERY

## 2022-11-23 PROCEDURE — 97110 THERAPEUTIC EXERCISES: CPT

## 2022-11-23 PROCEDURE — 63710000001 MUPIROCIN 2 % OINTMENT: Performed by: ORTHOPAEDIC SURGERY

## 2022-11-23 PROCEDURE — 80048 BASIC METABOLIC PNL TOTAL CA: CPT | Performed by: ORTHOPAEDIC SURGERY

## 2022-11-23 PROCEDURE — 97161 PT EVAL LOW COMPLEX 20 MIN: CPT

## 2022-11-23 PROCEDURE — 63710000001 CELECOXIB 200 MG CAPSULE: Performed by: ORTHOPAEDIC SURGERY

## 2022-11-23 PROCEDURE — 63710000001 LISINOPRIL 5 MG TABLET: Performed by: ORTHOPAEDIC SURGERY

## 2022-11-23 PROCEDURE — 63710000001 POLYETHYLENE GLYCOL 17 G PACK: Performed by: ORTHOPAEDIC SURGERY

## 2022-11-23 RX ORDER — POLYETHYLENE GLYCOL 3350 17 G/17G
17 POWDER, FOR SOLUTION ORAL DAILY
Status: DISCONTINUED | OUTPATIENT
Start: 2022-11-23 | End: 2022-11-24 | Stop reason: HOSPADM

## 2022-11-23 RX ADMIN — OXYCODONE AND ACETAMINOPHEN 1 TABLET: 5; 325 TABLET ORAL at 23:31

## 2022-11-23 RX ADMIN — DOCUSATE SODIUM 100 MG: 100 CAPSULE, LIQUID FILLED ORAL at 23:45

## 2022-11-23 RX ADMIN — OXYCODONE AND ACETAMINOPHEN 1 TABLET: 5; 325 TABLET ORAL at 14:14

## 2022-11-23 RX ADMIN — LISINOPRIL 5 MG: 5 TABLET ORAL at 08:22

## 2022-11-23 RX ADMIN — Medication 10 ML: at 21:36

## 2022-11-23 RX ADMIN — DOCUSATE SODIUM 100 MG: 100 CAPSULE, LIQUID FILLED ORAL at 00:43

## 2022-11-23 RX ADMIN — OXYCODONE AND ACETAMINOPHEN 2 TABLET: 5; 325 TABLET ORAL at 18:26

## 2022-11-23 RX ADMIN — MUPIROCIN 1 APPLICATION: 20 OINTMENT TOPICAL at 21:36

## 2022-11-23 RX ADMIN — Medication 10 ML: at 10:50

## 2022-11-23 RX ADMIN — PANTOPRAZOLE SODIUM 40 MG: 40 TABLET, DELAYED RELEASE ORAL at 18:26

## 2022-11-23 RX ADMIN — Medication 5 MG: at 00:43

## 2022-11-23 RX ADMIN — POLYETHYLENE GLYCOL 3350 17 G: 17 POWDER, FOR SOLUTION ORAL at 10:50

## 2022-11-23 RX ADMIN — ASPIRIN 325 MG: 325 TABLET, COATED ORAL at 08:21

## 2022-11-23 RX ADMIN — CLINDAMYCIN PHOSPHATE 900 MG: 900 INJECTION, SOLUTION INTRAVENOUS at 06:04

## 2022-11-23 RX ADMIN — FERROUS SULFATE TAB 325 MG (65 MG ELEMENTAL FE) 325 MG: 325 (65 FE) TAB at 08:22

## 2022-11-23 RX ADMIN — MUPIROCIN: 20 OINTMENT TOPICAL at 10:49

## 2022-11-23 RX ADMIN — ATORVASTATIN CALCIUM 10 MG: 20 TABLET, FILM COATED ORAL at 08:23

## 2022-11-23 RX ADMIN — OXYCODONE AND ACETAMINOPHEN 1 TABLET: 5; 325 TABLET ORAL at 14:39

## 2022-11-23 RX ADMIN — CELECOXIB 200 MG: 200 CAPSULE ORAL at 08:23

## 2022-11-24 VITALS
RESPIRATION RATE: 16 BRPM | OXYGEN SATURATION: 94 % | SYSTOLIC BLOOD PRESSURE: 128 MMHG | DIASTOLIC BLOOD PRESSURE: 69 MMHG | TEMPERATURE: 97.9 F | HEART RATE: 66 BPM

## 2022-11-24 LAB — SARS-COV-2 RNA RESP QL NAA+PROBE: NOT DETECTED

## 2022-11-24 PROCEDURE — 97110 THERAPEUTIC EXERCISES: CPT

## 2022-11-24 PROCEDURE — 63710000001 LISINOPRIL 5 MG TABLET: Performed by: ORTHOPAEDIC SURGERY

## 2022-11-24 PROCEDURE — 63710000001 FERROUS SULFATE 325 (65 FE) MG TABLET: Performed by: ORTHOPAEDIC SURGERY

## 2022-11-24 PROCEDURE — A9270 NON-COVERED ITEM OR SERVICE: HCPCS | Performed by: ORTHOPAEDIC SURGERY

## 2022-11-24 PROCEDURE — 63710000001 MELATONIN 5 MG TABLET: Performed by: ORTHOPAEDIC SURGERY

## 2022-11-24 PROCEDURE — 63710000001 POLYETHYLENE GLYCOL 17 G PACK: Performed by: ORTHOPAEDIC SURGERY

## 2022-11-24 PROCEDURE — 63710000001 MUPIROCIN 2 % OINTMENT: Performed by: ORTHOPAEDIC SURGERY

## 2022-11-24 PROCEDURE — G0378 HOSPITAL OBSERVATION PER HR: HCPCS

## 2022-11-24 PROCEDURE — 63710000001 ATORVASTATIN 20 MG TABLET: Performed by: ORTHOPAEDIC SURGERY

## 2022-11-24 PROCEDURE — 97530 THERAPEUTIC ACTIVITIES: CPT

## 2022-11-24 PROCEDURE — U0003 INFECTIOUS AGENT DETECTION BY NUCLEIC ACID (DNA OR RNA); SEVERE ACUTE RESPIRATORY SYNDROME CORONAVIRUS 2 (SARS-COV-2) (CORONAVIRUS DISEASE [COVID-19]), AMPLIFIED PROBE TECHNIQUE, MAKING USE OF HIGH THROUGHPUT TECHNOLOGIES AS DESCRIBED BY CMS-2020-01-R: HCPCS | Performed by: ORTHOPAEDIC SURGERY

## 2022-11-24 PROCEDURE — 63710000001 PANTOPRAZOLE 40 MG TABLET DELAYED-RELEASE: Performed by: ORTHOPAEDIC SURGERY

## 2022-11-24 PROCEDURE — 63710000001 DIAZEPAM 5 MG TABLET: Performed by: ORTHOPAEDIC SURGERY

## 2022-11-24 PROCEDURE — 63710000001 CELECOXIB 200 MG CAPSULE: Performed by: ORTHOPAEDIC SURGERY

## 2022-11-24 PROCEDURE — 63710000001 OXYCODONE-ACETAMINOPHEN 5-325 MG TABLET: Performed by: ORTHOPAEDIC SURGERY

## 2022-11-24 PROCEDURE — 63710000001 ASPIRIN EC 325 MG TABLET DELAYED-RELEASE: Performed by: ORTHOPAEDIC SURGERY

## 2022-11-24 RX ORDER — OXYCODONE HYDROCHLORIDE AND ACETAMINOPHEN 5; 325 MG/1; MG/1
1-2 TABLET ORAL EVERY 4 HOURS PRN
Qty: 60 TABLET | Refills: 0 | Status: SHIPPED | OUTPATIENT
Start: 2022-11-24

## 2022-11-24 RX ORDER — ASPIRIN 325 MG
325 TABLET ORAL DAILY
Qty: 45 TABLET | Refills: 0 | Status: SHIPPED | OUTPATIENT
Start: 2022-11-24

## 2022-11-24 RX ORDER — DOCUSATE SODIUM 250 MG
250 CAPSULE ORAL 2 TIMES DAILY PRN
Qty: 30 CAPSULE | Refills: 1 | Status: SHIPPED | OUTPATIENT
Start: 2022-11-24

## 2022-11-24 RX ADMIN — Medication 5 MG: at 01:01

## 2022-11-24 RX ADMIN — FERROUS SULFATE TAB 325 MG (65 MG ELEMENTAL FE) 325 MG: 325 (65 FE) TAB at 09:47

## 2022-11-24 RX ADMIN — Medication 10 ML: at 09:51

## 2022-11-24 RX ADMIN — OXYCODONE AND ACETAMINOPHEN 2 TABLET: 5; 325 TABLET ORAL at 06:30

## 2022-11-24 RX ADMIN — DIAZEPAM 5 MG: 5 TABLET ORAL at 09:52

## 2022-11-24 RX ADMIN — PANTOPRAZOLE SODIUM 40 MG: 40 TABLET, DELAYED RELEASE ORAL at 06:30

## 2022-11-24 RX ADMIN — CELECOXIB 200 MG: 200 CAPSULE ORAL at 09:46

## 2022-11-24 RX ADMIN — ASPIRIN 325 MG: 325 TABLET, COATED ORAL at 09:46

## 2022-11-24 RX ADMIN — MUPIROCIN 1 APPLICATION: 20 OINTMENT TOPICAL at 09:47

## 2022-11-24 RX ADMIN — LISINOPRIL 5 MG: 5 TABLET ORAL at 09:47

## 2022-11-24 RX ADMIN — ATORVASTATIN CALCIUM 10 MG: 20 TABLET, FILM COATED ORAL at 09:46

## 2022-11-24 RX ADMIN — OXYCODONE AND ACETAMINOPHEN 1 TABLET: 5; 325 TABLET ORAL at 00:15

## 2022-11-24 RX ADMIN — POLYETHYLENE GLYCOL 3350 17 G: 17 POWDER, FOR SOLUTION ORAL at 09:47

## 2022-11-24 RX ADMIN — OXYCODONE AND ACETAMINOPHEN 2 TABLET: 5; 325 TABLET ORAL at 11:30

## 2022-11-29 ENCOUNTER — TELEPHONE (OUTPATIENT)
Dept: ORTHOPEDIC SURGERY | Facility: HOSPITAL | Age: 82
End: 2022-11-29

## 2022-11-29 NOTE — TELEPHONE ENCOUNTER
Attempted to speak with Ms. Plascencia to see how she is doing as she is 1 week SP LTK. Message left at this time.

## 2022-12-05 ENCOUNTER — TRANSCRIBE ORDERS (OUTPATIENT)
Dept: PHYSICAL THERAPY | Facility: CLINIC | Age: 82
End: 2022-12-05

## 2022-12-05 DIAGNOSIS — Z96.652 STATUS POST TOTAL KNEE REPLACEMENT, LEFT: Primary | ICD-10-CM

## 2022-12-08 ENCOUNTER — TREATMENT (OUTPATIENT)
Dept: PHYSICAL THERAPY | Facility: CLINIC | Age: 82
End: 2022-12-08

## 2022-12-08 DIAGNOSIS — R26.81 UNSTEADY GAIT: ICD-10-CM

## 2022-12-08 DIAGNOSIS — M25.562 ACUTE PAIN OF LEFT KNEE: Primary | ICD-10-CM

## 2022-12-08 DIAGNOSIS — Z47.89 ORTHOPEDIC AFTERCARE: ICD-10-CM

## 2022-12-08 PROCEDURE — 97110 THERAPEUTIC EXERCISES: CPT | Performed by: PHYSICAL THERAPIST

## 2022-12-08 PROCEDURE — 97162 PT EVAL MOD COMPLEX 30 MIN: CPT | Performed by: PHYSICAL THERAPIST

## 2022-12-08 PROCEDURE — 97116 GAIT TRAINING THERAPY: CPT | Performed by: PHYSICAL THERAPIST

## 2022-12-08 NOTE — PROGRESS NOTES
Physical Therapy Initial Evaluation and Plan of Care    [facility]  [address] [place] [address]  [phone] (phone)  [phone] (fax)      Patient: Madisyn Plascencia   : 1940  Diagnosis/ICD-10 Code:  Acute pain of left knee [M25.562]  Referring practitioner: Antonio Riggins MD  Date of Initial Visit: 2022  Today's Date: 2022  Patient seen for 1 sessions           Subjective Evaluation    History of Present Illness  Date of surgery: 2022  Mechanism of injury: Lives  Alone normally. But living with daughter for the time being.     spc gait 5-10 mins    2-3 hr sleep.     aspirin    Subjective comment: pt presents to PT 2.5 wks s/p (L) TKAQuality of life: good    Pain  Current pain ratin  At best pain ratin  At worst pain ratin  Quality: dull ache, cramping, discomfort, knife-like, pulling and tight  Aggravating factors: ambulation, squatting, stairs, standing, movement, lifting, prolonged positioning and repetitive movement  Progression: improved    Patient Goals  Patient goals for therapy: increased strength, independence with ADLs/IADLs, decreased pain, improved balance and return to sport/leisure activities             Objective          Observations   Left Knee   Positive for effusion.     Additional Knee Observation Details  (L) knee effusion noted; (-) homans; take aspirin    Tenderness     Additional Tenderness Details  Incision healing.     Active Range of Motion   Left Knee   Flexion: 105 degrees   Extensor la degrees     Strength/Myotome Testing     Left Knee   Flexion: 3+  Extension: 3+  Quadriceps contraction: fair    Additional Strength Details  Hip flexion (L) 4-/5 (B) glute medius 4-/5     General Comments     Knee Comments  Pain with transfers/ poor tolerance ascending stairs       See Exercise, Manual, and Modality Logs for complete treatment.       Functional Outcome Score: timed sit-stand x 5 test 24  s.        Assessment & Plan     Assessment  Impairments: abnormal gait, abnormal or restricted ROM, activity intolerance, impaired balance, impaired physical strength, lacks appropriate home exercise program, pain with function and safety issue  Functional Limitations: carrying objects, walking, uncomfortable because of pain, sitting, standing and stooping  Assessment details: Madisyn Plascencia is a 82 y.o. year-old female referred to physical therapy for 2.5 wks s/p (L) TKA.  Pt reports 4/ 10 (L) knee pain. Pt notes limited tolerance with prolonged ambulation with use of spc 10 mins;  Pain with all transfers. Pt demonstrates decreased (L) knee AAROM 0/2/ 105;  Unsteady gait is noted with gait with use of spc.  Decrease (L) quad/ hs MMT 3+/5;   Incision is healing. She presents with a evolving clinical presentation.  She has no comorbidities  and no personal factors  that may affect her progress in the plan of care.  Signs and symptoms are consistent with physical therapy diagnosis of unsteady gait/ orthopedic aftercare/ (L) knee pain  Prognosis: good    Goals  Plan Goals: stg 6 wks    Pt to be educated/ independent with initial HEP    Increased (L) knee AAROM 0/0/ 110 to allow for increased ease with dressing/grooming/ dressing activities.    Pt to demonstrate correct gait sequence with use of spc    ltg 12    Increase (L) knee AAROM 0/0/ 120 to allow for increased ease with all transfers    Increase (L) quad/ glute medius MMT 4/5 to allow for increased ease 25 min prolonged ambulation    Pt to tolerate frontal/ transverse plane mvmt patterns without evidence of LOB consistently.     Improve timed sit-stand x 5 test from 24 s to 20s     Plan  Therapy options: will be seen for skilled therapy services  Planned modality interventions: cryotherapy, electrical stimulation/Russian stimulation, TENS, ultrasound and thermotherapy (hydrocollator packs)  Planned therapy interventions: abdominal trunk stabilization, manual  therapy, motor coordination training, neuromuscular re-education, balance/weight-bearing training, ADL retraining, flexibility, functional ROM exercises, gait training, home exercise program, joint mobilization, transfer training, therapeutic activities, strengthening, stretching and soft tissue mobilization  Frequency: 2x week  Duration in weeks: 12  Treatment plan discussed with: patient        Timed:  Manual Therapy:       mins  98852;  Therapeutic Exercise:     15    mins  48662;     Neuromuscular Bruno:        mins  17130;    Therapeutic Activity:          mins  51621;     Gait Trainin     mins  92738;     Ultrasound:          mins  30116;    Iontophoresis         mins 44353      Untimed:  Electrical Stimulation:         mins  44145 ( );  Traction:       mins  18986;   Low Eval          Mins  22335  Mod Eval   20       Mins  06867  High Eval                            Mins  01744  Dry Needling  (1-2 muscles)                 mins 67136 (Self-pay)  Dry Needling (3-4 muscles)      mins 38206 (Self-pay)  Dry Needling Trial         mins DRYNDLTRIAL  (No Charge)      Timed Treatment:    23  mins   Total Treatment:      43  mins    PT SIGNATURE: Carson Goodman PT     [place] License Number: [id number]    Electronically signed by Carson Goodman, PT, 22, 11:12 AM EST    DATE TREATMENT INITIATED: 2022    Initial Certification  Certification Period: 3/8/2023  I certify that the therapy services are furnished while this patient is under my care.  The services outlined above are required by this patient, and will be reviewed every 90 days.     PHYSICIAN: Antonio Riggins MD   NPI: [id number]                                         DATE:     Please sign and return via fax to [phone] Thank you, [facility].

## 2022-12-13 ENCOUNTER — TREATMENT (OUTPATIENT)
Dept: PHYSICAL THERAPY | Facility: CLINIC | Age: 82
End: 2022-12-13

## 2022-12-13 DIAGNOSIS — M25.562 ACUTE PAIN OF LEFT KNEE: Primary | ICD-10-CM

## 2022-12-13 DIAGNOSIS — R26.81 UNSTEADY GAIT: ICD-10-CM

## 2022-12-13 DIAGNOSIS — Z47.89 ORTHOPEDIC AFTERCARE: ICD-10-CM

## 2022-12-13 PROCEDURE — 97116 GAIT TRAINING THERAPY: CPT | Performed by: PHYSICAL THERAPIST

## 2022-12-13 PROCEDURE — 97110 THERAPEUTIC EXERCISES: CPT | Performed by: PHYSICAL THERAPIST

## 2022-12-13 NOTE — PROGRESS NOTES
Physical Therapy Daily Progress Note    Patient: Madisyn Plascencia   : 1940  Diagnosis/ICD-10 Code:  Acute pain of left knee [M25.562]  Referring practitioner: Antonio Riggins MD  Date of Initial Visit: No linked episodes  Today's Date: 2022  Patient seen for Visit count could not be calculated. Make sure you are using a visit which is associated with an episode. sessions           Subjective took two pain pills bc I had the shakes    Objective   See Exercise, Manual, and Modality Logs for complete treatment.       Assessment/Plan    Progressed with knee AAROM/ AROM.  Improved stride length with gait with use of spc. Removed extra staple from knee. Full knee extension is noted.  Pain controlled. Implemented quad strengthening 0-30 degrees.  Anxious at times.        Timed:    Manual Therapy:        mins  76214;  Therapeutic Exercise:       32mins  78550;     Neuromuscular Bruno:       mins  60717;    Therapeutic Activity:          mins  72335;     Gait Trainin    mins  95946;     Ultrasound:          mins  65148;    Electrical Stimulation:         mins  55999 ( );  Iontophoresis         mins 32533;  Aquatic Therapy         mins 95539;  Dry Needling                   mins /  (Self-pay)    Untimed:  Electrical Stimulation:         mins  06845 ( );  Traction:         mins  80767;     Timed Treatment:   40   mins   Total Treatment:      40  mins    Carson Goodman PT  Physical Therapist    [place] License: [id number]

## 2022-12-15 ENCOUNTER — TREATMENT (OUTPATIENT)
Dept: PHYSICAL THERAPY | Facility: CLINIC | Age: 82
End: 2022-12-15

## 2022-12-15 DIAGNOSIS — M25.562 ACUTE PAIN OF LEFT KNEE: Primary | ICD-10-CM

## 2022-12-15 DIAGNOSIS — R26.81 UNSTEADY GAIT: ICD-10-CM

## 2022-12-15 DIAGNOSIS — M62.569 MUSCLE WASTING AND ATROPHY, NEC, UNSP LOWER LEG: ICD-10-CM

## 2022-12-15 DIAGNOSIS — Z47.89 ORTHOPEDIC AFTERCARE: ICD-10-CM

## 2022-12-15 PROCEDURE — 97116 GAIT TRAINING THERAPY: CPT | Performed by: PHYSICAL THERAPIST

## 2022-12-15 PROCEDURE — 97110 THERAPEUTIC EXERCISES: CPT | Performed by: PHYSICAL THERAPIST

## 2022-12-15 NOTE — PROGRESS NOTES
Physical Therapy Daily Progress Note    Patient: Madisyn Plascencia   : 1940  Diagnosis/ICD-10 Code:  Acute pain of left knee [M25.562]  Referring practitioner: Antonio Riggins MD  Date of Initial Visit: Type: THERAPY  Noted: 2022  Today's Date: 12/15/2022  Patient seen for 2 sessions           Subjective I feel real anxious today for some reason    Objective   See Exercise, Manual, and Modality Logs for complete treatment.       Assessment/Plan  Pt needed time to regroup from walking into facility. Anxious.  Encouraged pt/son to discuss high level of anxiety with PCP.  Progressed with knee AAROM/ AROM;  Ambulating well with use of spc. Full knee extension.          Timed:    Manual Therapy:       mins  36406;  Therapeutic Exercise:    25     mins  99471;     Neuromuscular Bruno:        mins  90903;    Therapeutic Activity:          mins  42467;     Gait Training:       10    mins  37450;     Ultrasound:          mins  01886;    Electrical Stimulation:         mins  72903 ( );  Iontophoresis         mins 22528;  Aquatic Therapy         mins 78990;  Dry Needling                   mins /  (Self-pay)    Untimed:  Electrical Stimulation:         mins  76142 ( );  Traction:         mins  61678;     Timed Treatment:   35   mins   Total Treatment:     35   mins    Carson Goodman, PT  Physical Therapist    [place] License: [id number]

## 2022-12-20 ENCOUNTER — TREATMENT (OUTPATIENT)
Dept: PHYSICAL THERAPY | Facility: CLINIC | Age: 82
End: 2022-12-20

## 2022-12-20 DIAGNOSIS — M25.562 ACUTE PAIN OF LEFT KNEE: Primary | ICD-10-CM

## 2022-12-20 DIAGNOSIS — Z47.89 ORTHOPEDIC AFTERCARE: ICD-10-CM

## 2022-12-20 DIAGNOSIS — R26.81 UNSTEADY GAIT: ICD-10-CM

## 2022-12-20 PROCEDURE — 97110 THERAPEUTIC EXERCISES: CPT | Performed by: PHYSICAL THERAPIST

## 2022-12-20 NOTE — PROGRESS NOTES
Physical Therapy Daily Progress Note    Patient: Madisyn Plascencia   : 1940  Diagnosis/ICD-10 Code:  Acute pain of left knee [M25.562]  Referring practitioner: Antonio Riggins MD  Date of Initial Visit: Type: THERAPY  Noted: 2022  Today's Date: 2022  Patient seen for 3 sessions           Subjective my anxiety is better. Going to md to address it.     Objective   See Exercise, Manual, and Modality Logs for complete treatment.       Assessment/Plan  Progressed with knee AAROM/ AROM;  Improving gait sequence with use of spc; sound knee flexion with hip swing.  (L) knee AAROM 0/0/ 114;  Improved sit-stand x 5 test 20 s.          Timed:    Manual Therapy:       mins  46108;  Therapeutic Exercise:     35    mins  46949;     Neuromuscular Bruno:        mins  04968;    Therapeutic Activity:          mins  32877;     Gait Training:           mins  73560;     Ultrasound:          mins  87078;    Electrical Stimulation:         mins  35920 ( );  Iontophoresis         mins 77175;  Aquatic Therapy         mins 82124;  Dry Needling                   mins /  (Self-pay)    Untimed:  Electrical Stimulation:         mins  15203 ( );  Traction:         mins  47565;     Timed Treatment:   35   mins   Total Treatment:    35   mins    Carson Goodman PT  Physical Therapist    [place] License: [id number]

## 2022-12-22 ENCOUNTER — HOSPITAL ENCOUNTER (OUTPATIENT)
Dept: CARDIOLOGY | Facility: HOSPITAL | Age: 82
Discharge: HOME | End: 2022-12-22
Admitting: ORTHOPAEDIC SURGERY

## 2022-12-22 ENCOUNTER — TRANSCRIBE ORDERS (OUTPATIENT)
Dept: ADMINISTRATIVE | Facility: HOSPITAL | Age: 82
End: 2022-12-22

## 2022-12-22 ENCOUNTER — TREATMENT (OUTPATIENT)
Dept: PHYSICAL THERAPY | Facility: CLINIC | Age: 82
End: 2022-12-22

## 2022-12-22 DIAGNOSIS — M79.89 PAIN AND SWELLING OF LOWER LEG, LEFT: Primary | ICD-10-CM

## 2022-12-22 DIAGNOSIS — M25.562 ACUTE PAIN OF LEFT KNEE: Primary | ICD-10-CM

## 2022-12-22 DIAGNOSIS — M79.89 PAIN AND SWELLING OF LOWER LEG, LEFT: ICD-10-CM

## 2022-12-22 DIAGNOSIS — M79.662 PAIN AND SWELLING OF LOWER LEG, LEFT: ICD-10-CM

## 2022-12-22 DIAGNOSIS — R26.81 UNSTEADY GAIT: ICD-10-CM

## 2022-12-22 DIAGNOSIS — M79.662 PAIN AND SWELLING OF LOWER LEG, LEFT: Primary | ICD-10-CM

## 2022-12-22 LAB
BH CV LOWER VASCULAR LEFT COMMON FEMORAL AUGMENT: NORMAL
BH CV LOWER VASCULAR LEFT COMMON FEMORAL COMPETENT: NORMAL
BH CV LOWER VASCULAR LEFT COMMON FEMORAL COMPRESS: NORMAL
BH CV LOWER VASCULAR LEFT COMMON FEMORAL PHASIC: NORMAL
BH CV LOWER VASCULAR LEFT COMMON FEMORAL SPONT: NORMAL
BH CV LOWER VASCULAR LEFT DISTAL FEMORAL COMPRESS: NORMAL
BH CV LOWER VASCULAR LEFT GASTRONEMIUS COMPRESS: NORMAL
BH CV LOWER VASCULAR LEFT GREATER SAPH AK COMPRESS: NORMAL
BH CV LOWER VASCULAR LEFT GREATER SAPH BK COMPRESS: NORMAL
BH CV LOWER VASCULAR LEFT LESSER SAPH COMPRESS: NORMAL
BH CV LOWER VASCULAR LEFT MID FEMORAL AUGMENT: NORMAL
BH CV LOWER VASCULAR LEFT MID FEMORAL COMPETENT: NORMAL
BH CV LOWER VASCULAR LEFT MID FEMORAL COMPRESS: NORMAL
BH CV LOWER VASCULAR LEFT MID FEMORAL PHASIC: NORMAL
BH CV LOWER VASCULAR LEFT MID FEMORAL SPONT: NORMAL
BH CV LOWER VASCULAR LEFT PERONEAL COMPRESS: NORMAL
BH CV LOWER VASCULAR LEFT POPLITEAL AUGMENT: NORMAL
BH CV LOWER VASCULAR LEFT POPLITEAL COMPETENT: NORMAL
BH CV LOWER VASCULAR LEFT POPLITEAL COMPRESS: NORMAL
BH CV LOWER VASCULAR LEFT POPLITEAL PHASIC: NORMAL
BH CV LOWER VASCULAR LEFT POPLITEAL SPONT: NORMAL
BH CV LOWER VASCULAR LEFT POSTERIOR TIBIAL COMPRESS: NORMAL
BH CV LOWER VASCULAR LEFT PROFUNDA FEMORAL COMPRESS: NORMAL
BH CV LOWER VASCULAR LEFT PROXIMAL FEMORAL COMPRESS: NORMAL
BH CV LOWER VASCULAR LEFT SAPHENOFEMORAL JUNCTION COMPRESS: NORMAL
BH CV LOWER VASCULAR RIGHT COMMON FEMORAL AUGMENT: NORMAL
BH CV LOWER VASCULAR RIGHT COMMON FEMORAL COMPETENT: NORMAL
BH CV LOWER VASCULAR RIGHT COMMON FEMORAL COMPRESS: NORMAL
BH CV LOWER VASCULAR RIGHT COMMON FEMORAL PHASIC: NORMAL
BH CV LOWER VASCULAR RIGHT COMMON FEMORAL SPONT: NORMAL
BH CV POP FLUID COLLECT LEFT: 1
MAXIMAL PREDICTED HEART RATE: 138 BPM
STRESS TARGET HR: 117 BPM

## 2022-12-22 PROCEDURE — 93971 EXTREMITY STUDY: CPT

## 2022-12-22 PROCEDURE — 97116 GAIT TRAINING THERAPY: CPT | Performed by: PHYSICAL THERAPIST

## 2022-12-22 PROCEDURE — 97110 THERAPEUTIC EXERCISES: CPT | Performed by: PHYSICAL THERAPIST

## 2022-12-22 NOTE — PROGRESS NOTES
Physical Therapy Daily Progress Note    Patient: Madisyn Plascencia   : 1940  Diagnosis/ICD-10 Code:  Acute pain of left knee [M25.562]  Referring practitioner: Antonio Riggins MD  Date of Initial Visit: Type: THERAPY  Noted: 2022  Today's Date: 2022  Patient seen for 4 sessions           Subjective knee is doing well . today    Objective   See Exercise, Manual, and Modality Logs for complete treatment.       Assessment/Plan    Knee AAROM 0/0/ 113; increased (L) quad MMT 4-/5; ambulating 10-15 mins with use of spc. No lob.  Verbal cuing to increased knee flexion with swing through gait sequence.        Timed:    Manual Therapy:        mins  38092;  Therapeutic Exercise:   25      mins  62977;     Neuromuscular Bruno:        mins  67246;    Therapeutic Activity:          mins  78091;     Gait Trainin        mins  27890;     Ultrasound:          mins  08727;    Electrical Stimulation:        mins  56365 ( );  Iontophoresis         mins 81495;  Aquatic Therapy         mins 64792;  Dry Needling                   mins /  (Self-pay)    Untimed:  Electrical Stimulation:         mins  02227 ( );  Traction:         mins  09087;     Timed Treatment:     33 mins   Total Treatment:     33  mins    Carson Goodman PT  Physical Therapist    [place] License: [id number]

## 2022-12-22 NOTE — PROGRESS NOTES
Patient sent for a left lower extremity venous duplex scan completed and preliminary report of negative for DVT called to [facility] Plascencia for Dr. Antonio Riggins. Patient advised and free to leave.

## 2022-12-27 ENCOUNTER — TREATMENT (OUTPATIENT)
Dept: PHYSICAL THERAPY | Facility: CLINIC | Age: 82
End: 2022-12-27

## 2022-12-27 DIAGNOSIS — Z47.89 ORTHOPEDIC AFTERCARE: ICD-10-CM

## 2022-12-27 DIAGNOSIS — R26.81 UNSTEADY GAIT: ICD-10-CM

## 2022-12-27 DIAGNOSIS — M62.569 MUSCLE WASTING AND ATROPHY, NEC, UNSP LOWER LEG: ICD-10-CM

## 2022-12-27 DIAGNOSIS — M25.562 ACUTE PAIN OF LEFT KNEE: Primary | ICD-10-CM

## 2022-12-27 PROCEDURE — 97110 THERAPEUTIC EXERCISES: CPT | Performed by: PHYSICAL THERAPIST

## 2022-12-27 PROCEDURE — 97116 GAIT TRAINING THERAPY: CPT | Performed by: PHYSICAL THERAPIST

## 2022-12-27 NOTE — PROGRESS NOTES
Physical Therapy Daily Progress Note    Patient: Madisyn Plascencia   : 1940  Diagnosis/ICD-10 Code:  Acute pain of left knee [M25.562]  Referring practitioner: Antonio Riggins MD  Date of Initial Visit: Type: THERAPY  Noted: 2022  Today's Date: 2022  Patient seen for 5 sessions           Subjective knee seems to be doing very well. I drove to PT today without issue    Objective   See Exercise, Manual, and Modality Logs for complete treatment.       Assessment/Plan  Knee is progressing well with all intervention provided as evidence of no LOB with use of spc. Progressed with quad/ glute medius strengthening. Improving (L) knee AAROM 0/0/ 120. All stg met          Timed:    Manual Therapy:        mins  80588;  Therapeutic Exercise:      32   mins  37312;     Neuromuscular Bruno:        mins  39675;    Therapeutic Activity:          mins  06287;     Gait Trainin      mins  10431;     Ultrasound:          mins  13681;    Electrical Stimulation:         mins  02499 ( );  Iontophoresis         mins 99333;  Aquatic Therapy         mins 84630;  Dry Needling                  mins /  (Self-pay)    Untimed:  Electrical Stimulation:         mins  53002 ( );  Traction:         mins  80955;     Timed Treatment: 40     mins   Total Treatment:     40   mins    Carson Goodman PT  Physical Therapist    [place] License: [id number]

## 2022-12-29 ENCOUNTER — TREATMENT (OUTPATIENT)
Dept: PHYSICAL THERAPY | Facility: CLINIC | Age: 82
End: 2022-12-29

## 2022-12-29 ENCOUNTER — DOCUMENTATION (OUTPATIENT)
Dept: PHYSICAL THERAPY | Facility: HOSPITAL | Age: 82
End: 2022-12-29
Payer: MEDICARE

## 2022-12-29 DIAGNOSIS — M25.562 ACUTE PAIN OF LEFT KNEE: Primary | ICD-10-CM

## 2022-12-29 DIAGNOSIS — Z47.89 ORTHOPEDIC AFTERCARE: ICD-10-CM

## 2022-12-29 DIAGNOSIS — R26.81 UNSTEADY GAIT: ICD-10-CM

## 2022-12-29 PROCEDURE — 97110 THERAPEUTIC EXERCISES: CPT | Performed by: PHYSICAL THERAPIST

## 2022-12-29 PROCEDURE — 97116 GAIT TRAINING THERAPY: CPT | Performed by: PHYSICAL THERAPIST

## 2022-12-29 PROCEDURE — 97530 THERAPEUTIC ACTIVITIES: CPT | Performed by: PHYSICAL THERAPIST

## 2022-12-29 NOTE — PROGRESS NOTES
Physical Therapy Daily Progress Note    Patient: Madisyn Plascencia   : 1940  Diagnosis/ICD-10 Code:  Acute pain of left knee [M25.562]  Referring practitioner: Antonio Riggins MD  Date of Initial Visit: Type: THERAPY  Noted: 2022  Today's Date: 2022  Patient seen for 6 sessions           Subjective walking better. No knee pain    Objective   See Exercise, Manual, and Modality Logs for complete treatment.       Assessment/Plan    Ambulating 10-15 mins with use of spc. (L) knee AAROM 0/118; no pain with transfers/ no antalgia with gait. Independent with ascending stairs. With lashay of HR A.  All goals are met. Appropriate for discharge in 1 visit       Timed:    Manual Therapy:        mins  58744;  Therapeutic Exercise:     22    mins  04266;     Neuromuscular Bruno:        mins  35147;    Therapeutic Activity:    8      mins  73867;     Gait Training:      10     mins  79341;     Ultrasound:          mins  68045;    Electrical Stimulation:         mins  23173 ( );  Iontophoresis         mins 74934;  Aquatic Therapy         mins 62713;  Dry Needling                   mins 65739/  (Self-pay)    Untimed:  Electrical Stimulation:         mins  26166 ( );  Traction:         mins  99722;     Timed Treatment: 40     mins   Total Treatment:     40   mins    Carson Goodman PT  Physical Therapist    [place] License: [id number]

## 2023-01-18 ENCOUNTER — APPOINTMENT (OUTPATIENT)
Dept: CT IMAGING | Facility: HOSPITAL | Age: 83
End: 2023-01-18
Payer: MEDICARE

## 2023-01-18 ENCOUNTER — HOSPITAL ENCOUNTER (EMERGENCY)
Facility: HOSPITAL | Age: 83
Discharge: HOME | End: 2023-01-18
Attending: EMERGENCY MEDICINE | Admitting: EMERGENCY MEDICINE
Payer: MEDICARE

## 2023-01-18 VITALS
OXYGEN SATURATION: 96 % | WEIGHT: 207 LBS | TEMPERATURE: 98 F | RESPIRATION RATE: 20 BRPM | BODY MASS INDEX: 38.09 KG/M2 | SYSTOLIC BLOOD PRESSURE: 154 MMHG | HEART RATE: 68 BPM | HEIGHT: 62 IN | DIASTOLIC BLOOD PRESSURE: 92 MMHG

## 2023-01-18 DIAGNOSIS — R10.9 ACUTE RIGHT FLANK PAIN: Primary | ICD-10-CM

## 2023-01-18 LAB
ALBUMIN SERPL-MCNC: 4.4 G/DL (ref 3.5–5.2)
ALBUMIN/GLOB SERPL: 1.8 G/DL
ALP SERPL-CCNC: 93 U/L (ref 39–117)
ALT SERPL W P-5'-P-CCNC: 18 U/L (ref 1–33)
ANION GAP SERPL CALCULATED.3IONS-SCNC: 10.9 MMOL/L (ref 5–15)
AST SERPL-CCNC: 20 U/L (ref 1–32)
BASOPHILS # BLD AUTO: 0.03 10*3/MM3 (ref 0–0.2)
BASOPHILS NFR BLD AUTO: 0.3 % (ref 0–1.5)
BILIRUB SERPL-MCNC: 0.3 MG/DL (ref 0–1.2)
BILIRUB UR QL STRIP: NEGATIVE
BUN SERPL-MCNC: 18 MG/DL (ref 8–23)
BUN/CREAT SERPL: 20.2 (ref 7–25)
CALCIUM SPEC-SCNC: 9.9 MG/DL (ref 8.6–10.5)
CHLORIDE SERPL-SCNC: 102 MMOL/L (ref 98–107)
CLARITY UR: CLEAR
CO2 SERPL-SCNC: 25.1 MMOL/L (ref 22–29)
COLOR UR: YELLOW
CREAT SERPL-MCNC: 0.89 MG/DL (ref 0.57–1)
D DIMER PPP FEU-MCNC: 1.45 MCGFEU/ML (ref 0–0.82)
DEPRECATED RDW RBC AUTO: 41.8 FL (ref 37–54)
EGFRCR SERPLBLD CKD-EPI 2021: 64.8 ML/MIN/1.73
EOSINOPHIL # BLD AUTO: 0.23 10*3/MM3 (ref 0–0.4)
EOSINOPHIL NFR BLD AUTO: 2.6 % (ref 0.3–6.2)
ERYTHROCYTE [DISTWIDTH] IN BLOOD BY AUTOMATED COUNT: 12.3 % (ref 12.3–15.4)
GLOBULIN UR ELPH-MCNC: 2.5 GM/DL
GLUCOSE SERPL-MCNC: 123 MG/DL (ref 65–99)
GLUCOSE UR STRIP-MCNC: NEGATIVE MG/DL
HCT VFR BLD AUTO: 37.8 % (ref 34–46.6)
HGB BLD-MCNC: 12.7 G/DL (ref 12–15.9)
HGB UR QL STRIP.AUTO: NEGATIVE
HOLD SPECIMEN: NORMAL
HOLD SPECIMEN: NORMAL
IMM GRANULOCYTES # BLD AUTO: 0.03 10*3/MM3 (ref 0–0.05)
IMM GRANULOCYTES NFR BLD AUTO: 0.3 % (ref 0–0.5)
KETONES UR QL STRIP: NEGATIVE
LEUKOCYTE ESTERASE UR QL STRIP.AUTO: NEGATIVE
LIPASE SERPL-CCNC: 20 U/L (ref 13–60)
LYMPHOCYTES # BLD AUTO: 1.8 10*3/MM3 (ref 0.7–3.1)
LYMPHOCYTES NFR BLD AUTO: 20.1 % (ref 19.6–45.3)
MCH RBC QN AUTO: 31 PG (ref 26.6–33)
MCHC RBC AUTO-ENTMCNC: 33.6 G/DL (ref 31.5–35.7)
MCV RBC AUTO: 92.2 FL (ref 79–97)
MONOCYTES # BLD AUTO: 0.78 10*3/MM3 (ref 0.1–0.9)
MONOCYTES NFR BLD AUTO: 8.7 % (ref 5–12)
NEUTROPHILS NFR BLD AUTO: 6.07 10*3/MM3 (ref 1.7–7)
NEUTROPHILS NFR BLD AUTO: 68 % (ref 42.7–76)
NITRITE UR QL STRIP: NEGATIVE
NRBC BLD AUTO-RTO: 0 /100 WBC (ref 0–0.2)
PH UR STRIP.AUTO: 8 [PH] (ref 5–8)
PLATELET # BLD AUTO: 258 10*3/MM3 (ref 140–450)
PMV BLD AUTO: 9.5 FL (ref 6–12)
POTASSIUM SERPL-SCNC: 4.1 MMOL/L (ref 3.5–5.2)
PROT SERPL-MCNC: 6.9 G/DL (ref 6–8.5)
PROT UR QL STRIP: NEGATIVE
RBC # BLD AUTO: 4.1 10*6/MM3 (ref 3.77–5.28)
SODIUM SERPL-SCNC: 138 MMOL/L (ref 136–145)
SP GR UR STRIP: 1.01 (ref 1–1.03)
UROBILINOGEN UR QL STRIP: NORMAL
WBC NRBC COR # BLD: 8.94 10*3/MM3 (ref 3.4–10.8)
WHOLE BLOOD HOLD COAG: NORMAL
WHOLE BLOOD HOLD SPECIMEN: NORMAL

## 2023-01-18 PROCEDURE — 71275 CT ANGIOGRAPHY CHEST: CPT

## 2023-01-18 PROCEDURE — 80053 COMPREHEN METABOLIC PANEL: CPT | Performed by: EMERGENCY MEDICINE

## 2023-01-18 PROCEDURE — 85379 FIBRIN DEGRADATION QUANT: CPT | Performed by: EMERGENCY MEDICINE

## 2023-01-18 PROCEDURE — 0 IOPAMIDOL PER 1 ML: Performed by: EMERGENCY MEDICINE

## 2023-01-18 PROCEDURE — 83690 ASSAY OF LIPASE: CPT | Performed by: EMERGENCY MEDICINE

## 2023-01-18 PROCEDURE — 85025 COMPLETE CBC W/AUTO DIFF WBC: CPT | Performed by: EMERGENCY MEDICINE

## 2023-01-18 PROCEDURE — 74177 CT ABD & PELVIS W/CONTRAST: CPT

## 2023-01-18 PROCEDURE — 81003 URINALYSIS AUTO W/O SCOPE: CPT | Performed by: EMERGENCY MEDICINE

## 2023-01-18 PROCEDURE — 99284 EMERGENCY DEPT VISIT MOD MDM: CPT

## 2023-01-18 PROCEDURE — 63710000001 ONDANSETRON ODT 4 MG TABLET DISPERSIBLE: Performed by: EMERGENCY MEDICINE

## 2023-01-18 RX ORDER — HYDROCODONE BITARTRATE AND ACETAMINOPHEN 5; 325 MG/1; MG/1
1 TABLET ORAL EVERY 6 HOURS PRN
Qty: 14 TABLET | Refills: 0 | Status: SHIPPED | OUTPATIENT
Start: 2023-01-18

## 2023-01-18 RX ORDER — HYDROCODONE BITARTRATE AND ACETAMINOPHEN 5; 325 MG/1; MG/1
1 TABLET ORAL ONCE
Status: COMPLETED | OUTPATIENT
Start: 2023-01-18 | End: 2023-01-18

## 2023-01-18 RX ORDER — ONDANSETRON 4 MG/1
4 TABLET, ORALLY DISINTEGRATING ORAL ONCE
Status: COMPLETED | OUTPATIENT
Start: 2023-01-18 | End: 2023-01-18

## 2023-01-18 RX ORDER — SODIUM CHLORIDE 0.9 % (FLUSH) 0.9 %
10 SYRINGE (ML) INJECTION AS NEEDED
Status: DISCONTINUED | OUTPATIENT
Start: 2023-01-18 | End: 2023-01-18 | Stop reason: HOSPADM

## 2023-01-18 RX ADMIN — SODIUM CHLORIDE 500 ML: 9 INJECTION, SOLUTION INTRAVENOUS at 17:32

## 2023-01-18 RX ADMIN — HYDROCODONE BITARTRATE AND ACETAMINOPHEN 1 TABLET: 5; 325 TABLET ORAL at 15:48

## 2023-01-18 RX ADMIN — Medication 10 ML: at 17:33

## 2023-01-18 RX ADMIN — IOPAMIDOL 95 ML: 755 INJECTION, SOLUTION INTRAVENOUS at 19:22

## 2023-01-18 RX ADMIN — ONDANSETRON 4 MG: 4 TABLET, ORALLY DISINTEGRATING ORAL at 15:48

## 2023-01-18 NOTE — ED PROVIDER NOTES
EMERGENCY DEPARTMENT ENCOUNTER    Room Number:  11/11  Date seen:  1/19/2023  PCP: Seda Amaya DO  Historian: Patient and family member at bedside  Relevant information and history provided by sources other than the patient will be included in the context section.  Review of pertinent past medical records may also be included in the context section rather than MDM to avoid unnecessary redundancy.    HPI:  Chief Complaint: Right flank pain  A complete HPI/ROS/PMH/PSH/SH/FH are unobtainable due to: Nothing  Context: Madisyn Plascencia is a 82 y.o. female who presents to the ED c/o severe and constant right flank pain for the last 3 to 4 days.  The pain is sharp and stabbing, nonradiating, and worse with certain movements.  She has not injured herself that she is aware of, has not noticed any swelling or rash, has not had a fever, has no chest pain or shortness of breath and no abdominal pain.  She has no urinary symptoms, no abnormal GI symptoms, no change in her appetite, and nothing makes this better or worse    Patient was seen by the [facility] yesterday and diagnosed with musculoskeletal pain.  She was prescribed muscle relaxers and lidocaine patches and said that that has not helped whatsoever.    Looking back the patient's past medical record, it does say that she has a history of chronic pain syndrome, and when I ask her about that she was very displeased with her PCP who had recently discontinued her prescription for controlled substance pain management.  I am not entirely sure why that happened, and is not documented in the records that I can tell        REVIEW OF SYSTEMS  Review of Systems   All negative except above      PAST MEDICAL HISTORY  Active Ambulatory Problems     Diagnosis Date Noted   • Primary osteoarthritis of left knee 10/07/2022     Resolved Ambulatory Problems     Diagnosis Date Noted   • No Resolved Ambulatory Problems     Past Medical History:   Diagnosis Date   • Anxiety     • Arthritis    • C. difficile colitis    • Disease of thyroid gland    • GERD (gastroesophageal reflux disease)    • H/O uterine prolapse    • Hyperlipidemia    • Hypertension    • Hypothyroidism    • Knee swelling    • Osteopenia    • Scoliosis    • Shoulder injury    • Uterine prolapse          PAST SURGICAL HISTORY  Past Surgical History:   Procedure Laterality Date   • CHOLECYSTECTOMY     • COLONOSCOPY     • ENDOSCOPY     • ENDOSCOPY N/A 2021    Procedure: ESOPHAGOGASTRODUODENOSCOPY WITH [address] DILATION;  Surgeon: Tripp Pascal MD;  Location: [facility] ENDOSCOPY;  Service: Gastroenterology;  Laterality: N/A;  PRE- DYSPHAGIA  POST- ESOPHAGEAL RING   • JOINT REPLACEMENT Right     KNEE   • KNEE SURGERY     • TONSILLECTOMY     • TOTAL KNEE ARTHROPLASTY Left 2022    Procedure: LEFT TOTAL KNEE ARTHROPLASTY;  Surgeon: Antonio Riggins MD;  Location: [facility] OR [facility];  Service: Orthopedics;  Laterality: Left;         FAMILY HISTORY  Family History   Problem Relation Age of Onset   • Cancer Mother             • Early death Mother    • Heart attack Father    • Heart disease Father    • Migraines Daughter    • Malig Hyperthermia Neg Hx          SOCIAL HISTORY  Social History     Socioeconomic History   • Marital status: Single   Tobacco Use   • Smoking status: Never   • Smokeless tobacco: Never   Vaping Use   • Vaping Use: Never used   Substance and Sexual Activity   • Alcohol use: No   • Drug use: Never   • Sexual activity: Not Currently     Partners: Male     Birth control/protection: None     Comment: N/A         ALLERGIES  Sulfa antibiotics          PHYSICAL EXAM  ED Triage Vitals   Temp Heart Rate Resp BP SpO2   23 1426 23 1426 23 1426 23 1428 23 1426   98 °F (36.7 °C) 106 22 120/74 97 %      Temp src Heart Rate Source Patient Position BP Location FiO2 (%)   -- -- -- -- --              Physical Exam      GENERAL: Awake and alert, not acutely toxic,  clearly has increased pain with mood  HENT: nares patent  EYES: no scleral icterus, PERRL, EOMI  CV: regular rhythm, normal rate, distal pulses symmetric and palpable  RESPIRATORY: normal effort  ABDOMEN: soft, nondistended nontender with normal bowel sound.  There is tenderness to palpation in the right flank and a fairly small area which she has marked with a pen to show the location for us.  There is no redness, no swelling, no rash.  This appears to be overlying the lower posterior lateral ribs.  There is no bruising or signs of trauma, and it requires only very superficial palpation to reproduce the symptom.  MUSCULOSKELETAL: no deformity, there is no calf tenderness or pedal edema, there is no midline spine tenderness or step-off  NEURO: alert, moves all extremities, follows commands  PSYCH:  calm, cooperative  SKIN: warm, dry with no rash    Vital signs and nursing notes reviewed.          LAB RESULTS  Recent Results (from the past 24 hour(s))   Comprehensive Metabolic Panel    Collection Time: 01/18/23  3:54 PM    Specimen: Blood   Result Value Ref Range    Glucose 123 (H) 65 - 99 mg/dL    BUN 18 8 - 23 mg/dL    Creatinine 0.89 0.57 - 1.00 mg/dL    Sodium 138 136 - 145 mmol/L    Potassium 4.1 3.5 - 5.2 mmol/L    Chloride 102 98 - 107 mmol/L    CO2 25.1 22.0 - 29.0 mmol/L    Calcium 9.9 8.6 - 10.5 mg/dL    Total Protein 6.9 6.0 - 8.5 g/dL    Albumin 4.4 3.5 - 5.2 g/dL    ALT (SGPT) 18 1 - 33 U/L    AST (SGOT) 20 1 - 32 U/L    Alkaline Phosphatase 93 39 - 117 U/L    Total Bilirubin 0.3 0.0 - 1.2 mg/dL    Globulin 2.5 gm/dL    A/G Ratio 1.8 g/dL    BUN/Creatinine Ratio 20.2 7.0 - 25.0    Anion Gap 10.9 5.0 - 15.0 mmol/L    eGFR 64.8 >60.0 mL/min/1.73   Lipase    Collection Time: 01/18/23  3:54 PM    Specimen: Blood   Result Value Ref Range    Lipase 20 13 - 60 U/L   Green Top (Gel)    Collection Time: 01/18/23  3:54 PM   Result Value Ref Range    Extra Tube Hold for add-ons.    Lavender Top    Collection Time:  01/18/23  3:54 PM   Result Value Ref Range    Extra Tube hold for add-on    Gold Top - SST    Collection Time: 01/18/23  3:54 PM   Result Value Ref Range    Extra Tube Hold for add-ons.    Light Blue Top    Collection Time: 01/18/23  3:54 PM   Result Value Ref Range    Extra Tube Hold for add-ons.    CBC Auto Differential    Collection Time: 01/18/23  3:54 PM    Specimen: Blood   Result Value Ref Range    WBC 8.94 3.40 - 10.80 10*3/mm3    RBC 4.10 3.77 - 5.28 10*6/mm3    Hemoglobin 12.7 12.0 - 15.9 g/dL    Hematocrit 37.8 34.0 - 46.6 %    MCV 92.2 79.0 - 97.0 fL    MCH 31.0 26.6 - 33.0 pg    MCHC 33.6 31.5 - 35.7 g/dL    RDW 12.3 12.3 - 15.4 %    RDW-SD 41.8 37.0 - 54.0 fl    MPV 9.5 6.0 - 12.0 fL    Platelets 258 140 - 450 10*3/mm3    Neutrophil % 68.0 42.7 - 76.0 %    Lymphocyte % 20.1 19.6 - 45.3 %    Monocyte % 8.7 5.0 - 12.0 %    Eosinophil % 2.6 0.3 - 6.2 %    Basophil % 0.3 0.0 - 1.5 %    Immature Grans % 0.3 0.0 - 0.5 %    Neutrophils, Absolute 6.07 1.70 - 7.00 10*3/mm3    Lymphocytes, Absolute 1.80 0.70 - 3.10 10*3/mm3    Monocytes, Absolute 0.78 0.10 - 0.90 10*3/mm3    Eosinophils, Absolute 0.23 0.00 - 0.40 10*3/mm3    Basophils, Absolute 0.03 0.00 - 0.20 10*3/mm3    Immature Grans, Absolute 0.03 0.00 - 0.05 10*3/mm3    nRBC 0.0 0.0 - 0.2 /100 WBC   D-dimer, Quantitative    Collection Time: 01/18/23  3:54 PM    Specimen: Blood   Result Value Ref Range    D-Dimer, Quantitative 1.45 (H) 0.00 - 0.82 MCGFEU/mL   Urinalysis With Microscopic If Indicated (No Culture) - Urine, Clean Catch    Collection Time: 01/18/23  4:09 PM    Specimen: Urine, Clean Catch   Result Value Ref Range    Color, UA Yellow Yellow, Straw    Appearance, UA Clear Clear    pH, UA 8.0 5.0 - 8.0    Specific Gravity, UA 1.010 1.005 - 1.030    Glucose, UA Negative Negative    Ketones, UA Negative Negative    Bilirubin, UA Negative Negative    Blood, UA Negative Negative    Protein, UA Negative Negative    Leuk Esterase, UA Negative Negative     Nitrite, UA Negative Negative    Urobilinogen, UA 0.2 E.U./dL 0.2 - 1.0 E.U./dL       Ordered the above labs and reviewed the results.        RADIOLOGY  CT Angiogram Chest, CT Abdomen Pelvis With Contrast    Result Date: 1/18/2023   CT ANGIOGRAM OF THE CHEST. MULTIPLE CORONAL, SAGITTAL, AND 3-D RECONSTRUCTIONS. CT ABDOMEN AND PELVIS WITH IV CONTRAST  HISTORY: 82-year-old female with right-sided flank pain. Cholecystectomy in the past. TECHNIQUE: Radiation dose reduction techniques were utilized, including automated exposure control and exposure modulation based on body size. CT angiogram of the chest was performed. Multiple coronal, sagittal, and 3-D reconstruction images were obtained. Subsequently, 3 mm images were obtained through the abdomen and pelvis after the administration of IV contrast. Compared with CT abdomen and pelvis 12/17/2018. There is no previous chest CT for comparison.  FINDINGS: CHEST CTA: There is adequate opacification of the pulmonary arteries and there is no convincing evidence for pulmonary thromboemboli. There are no acute appearing airspace opacities and there are no pleural or pericardial effusions. There are moderate interstitial fibrotic changes and bronchiolectasis at the posterior aspect of the right lower lobe which is more prominent than previously. There is no lymphadenopathy within the chest. No acute fractures are seen.  ABDOMEN/PELVIS: There is a rotatory dextroscoliosis at the upper lumbar spine with advanced spondylosis at L1-L2 and L2-L3. Significant hypertrophic facet changes are noted at L4-L5 and L5-S1, greater on the right. Overall appearance is unchanged. The liver appears unremarkable. There is a 1.8 cm dilatation of the common bile duct with rapid tapering to normal caliber at the preampullary portion. Measurement is obtained on the coronal reconstruction series. When measured along the same plane the diameter is stable. The pancreas, spleen, and adrenals appear  unremarkable. Horseshoe kidney. There is no hydronephrosis. There is no acute bowel abnormality. There is diverticulosis throughout the colon without evidence for diverticulitis. The appendix appears within normal limits. The uterus and adnexa appear unremarkable. There is no free fluid or lymphadenopathy.      1. There is no convincing evidence for pulmonary thromboemboli. 2. No acute abnormality is seen within the chest. Interstitial fibrotic change at the right lower lobe. 3. There is no significant change in the extrahepatic biliary dilatation. There are no findings to suggest choledocholithiasis. 4. Upper lumbar rotatory dextroscoliosis with advanced spondylosis at L1-L2 and L2-L3. 5. Diffuse colonic diverticulosis without evidence for diverticulitis.   This report was finalized on 1/18/2023 8:32 PM by Dr. Lis Chakraborty M.D.        Ordered the above noted radiological studies. Reviewed by me in PACS.            PROCEDURES  Procedures              MEDICATIONS GIVEN IN ER  Medications   HYDROcodone-acetaminophen (NORCO) 5-325 MG per tablet 1 tablet (1 tablet Oral Given 1/18/23 1548)   ondansetron ODT (ZOFRAN-ODT) disintegrating tablet 4 mg (4 mg Oral Given 1/18/23 1548)   sodium chloride 0.9 % bolus 500 mL (0 mL Intravenous Stopped 1/18/23 1802)   iopamidol (ISOVUE-370) 76 % injection 95 mL (95 mL Intravenous Given 1/18/23 1922)                   MEDICAL DECISION MAKING, PROGRESS, and CONSULTS    All labs have been independently reviewed by me.    All radiology studies have been independently reviewed by me and discussed with radiologist dictating the report.    EKG's are independently viewed and interpreted by me.      The discussion below represents my analysis of pertinent findings related to patient's current condition, review of past medical history and available medical records, differential diagnosis, and discussion with consultants regarding this encounter.          ED Course as of 01/19/23 0211   Thu Stanley  19, 2023   0206 ECG and chemistry are unremarkable [DP]   0206 Lipase and urinalysis are unremarkable [DP]   0206 Liver enzymes normal [DP]   0207 Her D-dimer is elevated [DP]   0207 Because of the elevated dimer and the location of her pain we decided to CT angiogram of the chest.  Fortunately it did not show any evidence of pulmonary embolism, no pleural effusions or pulmonary infiltrates are identified.  There are some chronic appearing fibrotic changes in the right base which is in the area of the pain.  Perhaps some atelectasis question    CT the abdomen pelvis shows no acute abnormalities in the renal collecting system to suggest obstruction or infection.  There is no evidence of rib fractures.  The only abnormality of note is that she has a dilated common bile duct to 1.8 cm, which tapers down at the ampulla and I suspect is a result of previous surgery.  She is not having any biliary colic type symptoms, normal liver enzymes including bilirubin, normal lipase, and I do not suspect that this is an acute process. [DP]   0209 Ultimately, there is no evidence for an obvious cause.  There are certainly no serious pleuritic cause such as PE or infection.  There are no identifiable rib fractures or osteolytic lesions.  She does have severe degenerative changes that are appreciated throughout the thoracolumbar spine which probably explain the chronic pain that is is documented.    There is no evidence of kidney stone or pyelonephritis.  There is no bowel obstruction or masses or areas of inflammation to explain the pain.    Further, as I said above the pain was clearly reproduced with very superficial palpation in this area overlying the lower ribs.  Is also increased with twisting movements of the torso and this all points towards a musculoskeletal cause    I even considered shingles, but there is no rash [DP]   0210 I did give the patient a dose of some hydrocodone prior to discharge.  She was very frustrated that  there was nothing more that we could do to help reduce the pain at this point, and we talked for a while about different strategies she could use.  I was trying to avoid any opiate prescriptions as I wonder if there was some problematic encounter with her PCP that caused her to stop writing for the controlled substances.    However, she was very adamant that the pain was too much and the nothing they were trying was helping and she insisted that we do something.  I did agree that I would give her a 3-day prescription for hydrocodone but anything beyond that would have to be arranged with her PCP.  I also suggested that perhaps a referral to pain management would be in order if the pain persists [DP]      ED Course User Index  [DP] Khari Jackman MD              All appropriate hygiene and PPE requirements were satisfied with this patient encounter    FINAL DIAGNOSES  Final diagnoses:   Acute right flank pain         DISPOSITION  Discharge            Latest Documented Vital Signs:  As of 02:11 EST  BP- 154/92 HR- 68 Temp- 98 °F (36.7 °C) O2 sat- 96%        --    Please note that portions of this were completed with a voice recognition program.       Note Disclaimer: At [facility], we believe that sharing information builds trust and better relationships. You are receiving this note because you are receiving care at [facility] or recently visited. It is possible you will see health information before a provider has talked with you about it. This kind of information can be easy to misunderstand. To help you fully understand what it      Khari Jackman MD  01/19/23 [id number]

## 2023-01-18 NOTE — ED NOTES
Patient from home via pv; c/o right flank pain that started Sunday. Patient was given lidocaine patch and muscle relaxant w/o any relief.

## 2023-02-24 ENCOUNTER — TRANSCRIBE ORDERS (OUTPATIENT)
Dept: ADMINISTRATIVE | Facility: HOSPITAL | Age: 83
End: 2023-02-24
Payer: MEDICARE

## 2023-02-24 DIAGNOSIS — R06.00 DYSPNEA, UNSPECIFIED TYPE: ICD-10-CM

## 2023-02-27 ENCOUNTER — TRANSCRIBE ORDERS (OUTPATIENT)
Dept: ADMINISTRATIVE | Facility: HOSPITAL | Age: 83
End: 2023-02-27
Payer: MEDICARE

## 2023-02-27 ENCOUNTER — TELEPHONE (OUTPATIENT)
Dept: ORTHOPEDIC SURGERY | Facility: CLINIC | Age: 83
End: 2023-02-27

## 2023-02-27 DIAGNOSIS — R06.09 OTHER FORM OF DYSPNEA: Primary | ICD-10-CM

## 2023-02-28 ENCOUNTER — HOSPITAL ENCOUNTER (OUTPATIENT)
Dept: CT IMAGING | Facility: HOSPITAL | Age: 83
Discharge: HOME | End: 2023-02-28
Admitting: INTERNAL MEDICINE
Payer: MEDICARE

## 2023-02-28 DIAGNOSIS — R06.09 OTHER FORM OF DYSPNEA: ICD-10-CM

## 2023-02-28 PROCEDURE — 82565 ASSAY OF CREATININE: CPT

## 2023-02-28 PROCEDURE — 25510000001 IOPAMIDOL PER 1 ML: Performed by: INTERNAL MEDICINE

## 2023-02-28 PROCEDURE — 71275 CT ANGIOGRAPHY CHEST: CPT

## 2023-02-28 RX ADMIN — IOPAMIDOL 100 ML: 755 INJECTION, SOLUTION INTRAVENOUS at 11:59

## 2023-03-01 LAB — CREAT BLDA-MCNC: 1.1 MG/DL (ref 0.6–1.3)

## 2023-03-03 ENCOUNTER — TRANSCRIBE ORDERS (OUTPATIENT)
Dept: ADMINISTRATIVE | Facility: HOSPITAL | Age: 83
End: 2023-03-03
Payer: MEDICARE

## 2023-03-03 DIAGNOSIS — R06.00 DYSPNEA, UNSPECIFIED TYPE: Primary | ICD-10-CM

## 2023-03-03 DIAGNOSIS — R79.89 ELEVATED D-DIMER: Primary | ICD-10-CM

## 2023-03-06 ENCOUNTER — CLINICAL SUPPORT (OUTPATIENT)
Dept: ORTHOPEDIC SURGERY | Facility: CLINIC | Age: 83
End: 2023-03-06
Payer: MEDICARE

## 2023-03-06 VITALS — HEIGHT: 62 IN | TEMPERATURE: 97.9 F | WEIGHT: 208 LBS | BODY MASS INDEX: 38.28 KG/M2

## 2023-03-06 DIAGNOSIS — G89.29 CHRONIC RIGHT SHOULDER PAIN: Primary | ICD-10-CM

## 2023-03-06 DIAGNOSIS — M25.511 CHRONIC RIGHT SHOULDER PAIN: Primary | ICD-10-CM

## 2023-03-06 DIAGNOSIS — M25.512 CHRONIC LEFT SHOULDER PAIN: ICD-10-CM

## 2023-03-06 DIAGNOSIS — G89.29 CHRONIC LEFT SHOULDER PAIN: ICD-10-CM

## 2023-03-06 PROCEDURE — 20610 DRAIN/INJ JOINT/BURSA W/O US: CPT | Performed by: NURSE PRACTITIONER

## 2023-03-06 RX ORDER — METHYLPREDNISOLONE ACETATE 80 MG/ML
80 INJECTION, SUSPENSION INTRA-ARTICULAR; INTRALESIONAL; INTRAMUSCULAR; SOFT TISSUE
Status: COMPLETED | OUTPATIENT
Start: 2023-03-06 | End: 2023-03-06

## 2023-03-06 RX ADMIN — METHYLPREDNISOLONE ACETATE 80 MG: 80 INJECTION, SUSPENSION INTRA-ARTICULAR; INTRALESIONAL; INTRAMUSCULAR; SOFT TISSUE at 13:46

## 2023-03-06 NOTE — PROGRESS NOTES
Ms. Plascencia comes in today for follow-up.  Injections have worked well in the past.  The patient would like to get repeat injections today.  The risks, benefits and alternatives were discussed and the patient consented.  Going forward, the patient will follow-up as needed.    ADRYAN Velazquez    03/06/2023      Large Joint Arthrocentesis: R subacromial bursa  Date/Time: 3/6/2023 1:46 PM  Consent given by: patient  Site marked: site marked  Timeout: Immediately prior to procedure a time out was called to verify the correct patient, procedure, equipment, support staff and site/side marked as required   Supporting Documentation  Indications: pain   Procedure Details  Location: shoulder - R subacromial bursa  Preparation: Patient was prepped and draped in the usual sterile fashion  Needle gauge: 21G.  Approach: posterior  Medications administered: 80 mg methylPREDNISolone acetate 80 MG/ML; 2 mL lidocaine (cardiac)  Patient tolerance: patient tolerated the procedure well with no immediate complications    Large Joint Arthrocentesis: L subacromial bursa  Date/Time: 3/6/2023 1:46 PM  Consent given by: patient  Site marked: site marked  Timeout: Immediately prior to procedure a time out was called to verify the correct patient, procedure, equipment, support staff and site/side marked as required   Supporting Documentation  Indications: pain   Procedure Details  Location: shoulder - L subacromial bursa  Preparation: Patient was prepped and draped in the usual sterile fashion  Needle gauge: 21G.  Approach: posterior  Medications administered: 80 mg methylPREDNISolone acetate 80 MG/ML; 2 mL lidocaine (cardiac)  Patient tolerance: patient tolerated the procedure well with no immediate complications        Answers for HPI/ROS submitted by the patient on 3/6/2023  Please describe your symptoms.: Pain in both shoulders  Have you had these symptoms before?: Yes  How long have you been having these symptoms?: 1-2 weeks  Please list  any medications you are currently taking for this condition.: None… Dr. Hamilton gave me shots in back of both shoulders for bursitis  last summer and they lasted until recently.  Please describe any probable cause for these symptoms. : Unknown  What is the primary reason for your visit?: Other

## 2023-03-07 ENCOUNTER — HOSPITAL ENCOUNTER (OUTPATIENT)
Dept: RESPIRATORY THERAPY | Facility: HOSPITAL | Age: 83
Discharge: HOME | End: 2023-03-07
Admitting: INTERNAL MEDICINE
Payer: MEDICARE

## 2023-03-07 DIAGNOSIS — R06.00 DYSPNEA, UNSPECIFIED TYPE: ICD-10-CM

## 2023-03-07 LAB
ARTERIAL PATENCY WRIST A: ABNORMAL
ATMOSPHERIC PRESS: 760.2 MMHG
BASE EXCESS BLDA CALC-SCNC: -1.7 MMOL/L (ref 0–2)
BDY SITE: ABNORMAL
HCO3 BLDA-SCNC: 19.3 MMOL/L (ref 22–28)
MODALITY: ABNORMAL
PCO2 BLDA: 22.9 MM HG (ref 35–45)
PH BLDA: 7.53 PH UNITS (ref 7.35–7.45)
PO2 BLDA: 108.5 MM HG (ref 80–100)
SAO2 % BLDCOA: 98.9 % (ref 92–99)
TOTAL RATE: 24 BREATHS/MINUTE

## 2023-03-07 PROCEDURE — 94726 PLETHYSMOGRAPHY LUNG VOLUMES: CPT

## 2023-03-07 PROCEDURE — 94729 DIFFUSING CAPACITY: CPT

## 2023-03-07 PROCEDURE — 36600 WITHDRAWAL OF ARTERIAL BLOOD: CPT

## 2023-03-07 PROCEDURE — 82803 BLOOD GASES ANY COMBINATION: CPT

## 2023-03-07 PROCEDURE — 94010 BREATHING CAPACITY TEST: CPT

## 2023-03-14 ENCOUNTER — HOSPITAL ENCOUNTER (OUTPATIENT)
Dept: CARDIOLOGY | Facility: HOSPITAL | Age: 83
Discharge: HOME | End: 2023-03-14
Admitting: INTERNAL MEDICINE
Payer: MEDICARE

## 2023-03-14 DIAGNOSIS — R79.89 ELEVATED D-DIMER: ICD-10-CM

## 2023-03-14 LAB

## 2023-03-14 PROCEDURE — 93970 EXTREMITY STUDY: CPT

## 2023-03-21 ENCOUNTER — TRANSCRIBE ORDERS (OUTPATIENT)
Dept: ADMINISTRATIVE | Facility: HOSPITAL | Age: 83
End: 2023-03-21
Payer: MEDICARE

## 2023-03-21 DIAGNOSIS — J84.9 ILD (INTERSTITIAL LUNG DISEASE): ICD-10-CM

## 2023-03-21 DIAGNOSIS — J84.112 IPF (IDIOPATHIC PULMONARY FIBROSIS): Primary | ICD-10-CM

## 2023-04-11 ENCOUNTER — HOSPITAL ENCOUNTER (OUTPATIENT)
Dept: CT IMAGING | Facility: HOSPITAL | Age: 83
Discharge: HOME | End: 2023-04-11
Admitting: INTERNAL MEDICINE
Payer: MEDICARE

## 2023-04-11 DIAGNOSIS — J84.112 IPF (IDIOPATHIC PULMONARY FIBROSIS): ICD-10-CM

## 2023-04-11 DIAGNOSIS — J84.9 ILD (INTERSTITIAL LUNG DISEASE): ICD-10-CM

## 2023-04-11 PROCEDURE — 71250 CT THORAX DX C-: CPT

## 2023-04-14 ENCOUNTER — OFFICE VISIT (OUTPATIENT)
Dept: ORTHOPEDIC SURGERY | Facility: CLINIC | Age: 83
End: 2023-04-14
Payer: MEDICARE

## 2023-04-14 VITALS — HEIGHT: 64 IN | WEIGHT: 206.3 LBS | OXYGEN SATURATION: 99 % | BODY MASS INDEX: 35.22 KG/M2 | TEMPERATURE: 97.6 F

## 2023-04-14 DIAGNOSIS — M25.511 CHRONIC RIGHT SHOULDER PAIN: Primary | ICD-10-CM

## 2023-04-14 DIAGNOSIS — M25.512 CHRONIC LEFT SHOULDER PAIN: ICD-10-CM

## 2023-04-14 DIAGNOSIS — G89.29 CHRONIC LEFT SHOULDER PAIN: ICD-10-CM

## 2023-04-14 DIAGNOSIS — G89.29 CHRONIC RIGHT SHOULDER PAIN: Primary | ICD-10-CM

## 2023-04-14 PROCEDURE — 1160F RVW MEDS BY RX/DR IN RCRD: CPT | Performed by: ORTHOPAEDIC SURGERY

## 2023-04-14 PROCEDURE — 99212 OFFICE O/P EST SF 10 MIN: CPT | Performed by: ORTHOPAEDIC SURGERY

## 2023-04-14 PROCEDURE — 1159F MED LIST DOCD IN RCRD: CPT | Performed by: ORTHOPAEDIC SURGERY

## 2023-04-14 RX ORDER — ESCITALOPRAM OXALATE 10 MG/1
5 TABLET ORAL DAILY
COMMUNITY
Start: 2023-03-18

## 2023-04-14 RX ORDER — LEVOTHYROXINE SODIUM 100 MCG
TABLET ORAL
COMMUNITY
Start: 2023-01-24

## 2023-04-14 RX ORDER — VENLAFAXINE HYDROCHLORIDE 75 MG/1
1 CAPSULE, EXTENDED RELEASE ORAL DAILY
COMMUNITY
Start: 2023-04-06

## 2023-04-14 RX ORDER — NYSTATIN AND TRIAMCINOLONE ACETONIDE 100000; 1 [USP'U]/G; MG/G
OINTMENT TOPICAL
COMMUNITY

## 2023-04-14 RX ORDER — CELECOXIB 200 MG/1
CAPSULE ORAL
COMMUNITY
Start: 2023-01-21

## 2023-04-14 RX ORDER — PROMETHAZINE HYDROCHLORIDE 25 MG/1
TABLET ORAL AS NEEDED
COMMUNITY
Start: 2022-12-06

## 2023-04-14 RX ORDER — ZOLPIDEM TARTRATE 5 MG/1
5 TABLET ORAL NIGHTLY PRN
COMMUNITY

## 2023-04-14 RX ORDER — VENLAFAXINE HYDROCHLORIDE 37.5 MG/1
CAPSULE, EXTENDED RELEASE ORAL
COMMUNITY
Start: 2023-04-06

## 2023-04-14 RX ORDER — POLYETHYLENE GLYCOL 3350 17 G/17G
17 POWDER, FOR SOLUTION ORAL DAILY
COMMUNITY

## 2023-04-14 RX ORDER — LORAZEPAM 0.5 MG/1
.25-.5 TABLET ORAL 2 TIMES DAILY PRN
COMMUNITY
Start: 2023-04-06

## 2023-04-14 NOTE — PROGRESS NOTES
Patient:Madisyn Plascencia    YOB: 1940    Medical Record Number:[id number]    Chief Complaints:  Follow up bilateral shoulder pain    History of Present Illness:     82 y.o. female patient who presents for follow-up of both shoulders.  She got injections with Reina about a month ago.  They helped but she is still having some discomfort.  She comes in today to discuss further options.  Of note, she has a history of problems with anxiety.  She started having an anxiety attack soon after we roomed her here at the office.  We gave her a glass of water and she really seemed to calm down.  She says that this happens quite a lot and this is not at all unusual for her.  She remained fully awake, alert and did not seem to be in any distress throughout the encounter.    Extremities: Both shoulders were just briefly examined.  Skin is benign.  No focal areas of tenderness.  She has good motion.  When I have her resist elevation in the scapular plane, this is very uncomfortable for her bilaterally.  It is difficult to really gauge her strength.    Imaging:  No new x-rays taken today    Assessment/Plan:  Bilateral shoulder pain, suspected rotator cuff tears    I explained that it is too soon to consider repeating the injections.  Therapy may be beneficial and I have given her an order for that.  We can schedule her to come back in for injections in about 2 months.  She is in agreement with that plan.  She left the office in good condition.    Venkata Hamilton MD    04/14/2023

## 2023-04-17 ENCOUNTER — TRANSCRIBE ORDERS (OUTPATIENT)
Dept: ADMINISTRATIVE | Facility: HOSPITAL | Age: 83
End: 2023-04-17
Payer: MEDICARE

## 2023-04-17 DIAGNOSIS — R06.00 DYSPNEA, UNSPECIFIED TYPE: Primary | ICD-10-CM

## 2023-05-02 ENCOUNTER — HOSPITAL ENCOUNTER (OUTPATIENT)
Dept: CARDIOLOGY | Facility: HOSPITAL | Age: 83
Discharge: HOME | End: 2023-05-02
Admitting: INTERNAL MEDICINE
Payer: MEDICARE

## 2023-05-02 VITALS
SYSTOLIC BLOOD PRESSURE: 150 MMHG | WEIGHT: 206 LBS | HEIGHT: 64 IN | BODY MASS INDEX: 35.17 KG/M2 | DIASTOLIC BLOOD PRESSURE: 80 MMHG | HEART RATE: 87 BPM

## 2023-05-02 DIAGNOSIS — R06.00 DYSPNEA, UNSPECIFIED TYPE: ICD-10-CM

## 2023-05-02 LAB
AORTIC ARCH: 2.7 CM
ASCENDING AORTA: 3.3 CM
BH CV ECHO MEAS - ACS: 1.89 CM
BH CV ECHO MEAS - AO MAX PG: 8.5 MMHG
BH CV ECHO MEAS - AO MEAN PG: 4.5 MMHG
BH CV ECHO MEAS - AO ROOT DIAM: 3.3 CM
BH CV ECHO MEAS - AO V2 MAX: 145.8 CM/SEC
BH CV ECHO MEAS - AO V2 VTI: 30.7 CM
BH CV ECHO MEAS - AVA(I,D): 2.45 CM2
BH CV ECHO MEAS - EDV(CUBED): 68.8 ML
BH CV ECHO MEAS - EDV(MOD-SP2): 41 ML
BH CV ECHO MEAS - EDV(MOD-SP4): 52 ML
BH CV ECHO MEAS - EF(MOD-BP): 61.6 %
BH CV ECHO MEAS - EF(MOD-SP2): 68.3 %
BH CV ECHO MEAS - EF(MOD-SP4): 57.7 %
BH CV ECHO MEAS - ESV(CUBED): 17.2 ML
BH CV ECHO MEAS - ESV(MOD-SP2): 13 ML
BH CV ECHO MEAS - ESV(MOD-SP4): 22 ML
BH CV ECHO MEAS - FS: 37 %
BH CV ECHO MEAS - IVS/LVPW: 1.12 CM
BH CV ECHO MEAS - IVSD: 0.75 CM
BH CV ECHO MEAS - LAT PEAK E' VEL: 5 CM/SEC
BH CV ECHO MEAS - LV DIASTOLIC VOL/BSA (35-75): 26.3 CM2
BH CV ECHO MEAS - LV MASS(C)D: 82.9 GRAMS
BH CV ECHO MEAS - LV MAX PG: 6.9 MMHG
BH CV ECHO MEAS - LV MEAN PG: 3.5 MMHG
BH CV ECHO MEAS - LV SYSTOLIC VOL/BSA (12-30): 11.1 CM2
BH CV ECHO MEAS - LV V1 MAX: 130.9 CM/SEC
BH CV ECHO MEAS - LV V1 VTI: 24.9 CM
BH CV ECHO MEAS - LVIDD: 4.1 CM
BH CV ECHO MEAS - LVIDS: 2.6 CM
BH CV ECHO MEAS - LVOT AREA: 3 CM2
BH CV ECHO MEAS - LVOT DIAM: 1.96 CM
BH CV ECHO MEAS - LVPWD: 0.67 CM
BH CV ECHO MEAS - MED PEAK E' VEL: 11.3 CM/SEC
BH CV ECHO MEAS - MV A DUR: 0.14 SEC
BH CV ECHO MEAS - MV A MAX VEL: 92.1 CM/SEC
BH CV ECHO MEAS - MV DEC SLOPE: 262.1 CM/SEC2
BH CV ECHO MEAS - MV DEC TIME: 0.27 MSEC
BH CV ECHO MEAS - MV E MAX VEL: 52.3 CM/SEC
BH CV ECHO MEAS - MV E/A: 0.57
BH CV ECHO MEAS - MV MAX PG: 6 MMHG
BH CV ECHO MEAS - MV MEAN PG: 1.87 MMHG
BH CV ECHO MEAS - MV P1/2T: 70.2 MSEC
BH CV ECHO MEAS - MV V2 VTI: 18.4 CM
BH CV ECHO MEAS - MVA(P1/2T): 3.1 CM2
BH CV ECHO MEAS - MVA(VTI): 4.1 CM2
BH CV ECHO MEAS - PA ACC TIME: 0.16 SEC
BH CV ECHO MEAS - PA PR(ACCEL): 7.7 MMHG
BH CV ECHO MEAS - PA V2 MAX: 85.5 CM/SEC
BH CV ECHO MEAS - PULM A REVS DUR: 0.12 SEC
BH CV ECHO MEAS - PULM A REVS VEL: 34.7 CM/SEC
BH CV ECHO MEAS - PULM DIAS VEL: 31.3 CM/SEC
BH CV ECHO MEAS - PULM S/D: 1.73
BH CV ECHO MEAS - PULM SYS VEL: 54 CM/SEC
BH CV ECHO MEAS - RV MAX PG: 1.74 MMHG
BH CV ECHO MEAS - RV V1 MAX: 66 CM/SEC
BH CV ECHO MEAS - RV V1 VTI: 12.6 CM
BH CV ECHO MEAS - SI(MOD-SP2): 14.1 ML/M2
BH CV ECHO MEAS - SI(MOD-SP4): 15.1 ML/M2
BH CV ECHO MEAS - SUP REN AO DIAM: 2.5 CM
BH CV ECHO MEAS - SV(LVOT): 75.1 ML
BH CV ECHO MEAS - SV(MOD-SP2): 28 ML
BH CV ECHO MEAS - SV(MOD-SP4): 30 ML
BH CV ECHO MEAS - TAPSE (>1.6): 1.22 CM
BH CV ECHO MEASUREMENTS AVERAGE E/E' RATIO: 6.42
BH CV XLRA - RV BASE: 2.6 CM
BH CV XLRA - RV LENGTH: 6.1 CM
BH CV XLRA - RV MID: 2.32 CM
BH CV XLRA - TDI S': 8.7 CM/SEC
LEFT ATRIUM VOLUME INDEX: 16.4 ML/M2
MAXIMAL PREDICTED HEART RATE: 138 BPM
SINUS: 2.9 CM
STJ: 2.8 CM
STRESS TARGET HR: 117 BPM

## 2023-05-02 PROCEDURE — 93306 TTE W/DOPPLER COMPLETE: CPT | Performed by: INTERNAL MEDICINE

## 2023-05-02 PROCEDURE — 93306 TTE W/DOPPLER COMPLETE: CPT

## 2023-05-08 ENCOUNTER — TRANSCRIBE ORDERS (OUTPATIENT)
Dept: ADMINISTRATIVE | Facility: HOSPITAL | Age: 83
End: 2023-05-08
Payer: MEDICARE

## 2023-05-08 DIAGNOSIS — R22.1 NECK MASS: Primary | ICD-10-CM

## 2023-05-12 ENCOUNTER — HOSPITAL ENCOUNTER (OUTPATIENT)
Dept: CT IMAGING | Facility: HOSPITAL | Age: 83
Discharge: HOME | End: 2023-05-12
Payer: MEDICARE

## 2023-05-12 DIAGNOSIS — R06.00 DYSPNEA, UNSPECIFIED TYPE: ICD-10-CM

## 2023-05-12 DIAGNOSIS — R22.1 NECK MASS: ICD-10-CM

## 2023-05-12 PROCEDURE — 25510000001 IOPAMIDOL 61 % SOLUTION: Performed by: INTERNAL MEDICINE

## 2023-05-12 PROCEDURE — 70470 CT HEAD/BRAIN W/O & W/DYE: CPT

## 2023-05-12 PROCEDURE — 70491 CT SOFT TISSUE NECK W/DYE: CPT

## 2023-05-12 RX ADMIN — IOPAMIDOL 100 ML: 612 INJECTION, SOLUTION INTRAVENOUS at 15:32

## 2023-05-15 LAB — CREAT BLDA-MCNC: 1.2 MG/DL (ref 0.6–1.3)

## 2023-06-05 ENCOUNTER — TRANSCRIBE ORDERS (OUTPATIENT)
Dept: GENERAL RADIOLOGY | Facility: HOSPITAL | Age: 83
End: 2023-06-05
Payer: MEDICARE

## 2023-06-05 DIAGNOSIS — D37.030 NEOPLASM OF UNCERTAIN BEHAVIOR OF PAROTID SALIVARY GLAND: Primary | ICD-10-CM

## 2023-06-07 ENCOUNTER — CLINICAL SUPPORT (OUTPATIENT)
Dept: ORTHOPEDIC SURGERY | Facility: CLINIC | Age: 83
End: 2023-06-07
Payer: MEDICARE

## 2023-06-07 VITALS — HEIGHT: 63 IN | WEIGHT: 207 LBS | TEMPERATURE: 98.6 F | BODY MASS INDEX: 36.68 KG/M2

## 2023-06-07 DIAGNOSIS — M25.511 CHRONIC RIGHT SHOULDER PAIN: Primary | ICD-10-CM

## 2023-06-07 DIAGNOSIS — G89.29 CHRONIC LEFT SHOULDER PAIN: ICD-10-CM

## 2023-06-07 DIAGNOSIS — G89.29 CHRONIC RIGHT SHOULDER PAIN: Primary | ICD-10-CM

## 2023-06-07 DIAGNOSIS — M25.512 CHRONIC LEFT SHOULDER PAIN: ICD-10-CM

## 2023-06-07 RX ORDER — LIDOCAINE HYDROCHLORIDE 10 MG/ML
2 INJECTION, SOLUTION EPIDURAL; INFILTRATION; INTRACAUDAL; PERINEURAL
Status: COMPLETED | OUTPATIENT
Start: 2023-06-07 | End: 2023-06-07

## 2023-06-07 RX ORDER — METHYLPREDNISOLONE ACETATE 80 MG/ML
80 INJECTION, SUSPENSION INTRA-ARTICULAR; INTRALESIONAL; INTRAMUSCULAR; SOFT TISSUE
Status: COMPLETED | OUTPATIENT
Start: 2023-06-07 | End: 2023-06-07

## 2023-06-07 RX ADMIN — LIDOCAINE HYDROCHLORIDE 2 ML: 10 INJECTION, SOLUTION EPIDURAL; INFILTRATION; INTRACAUDAL; PERINEURAL at 13:08

## 2023-06-07 RX ADMIN — METHYLPREDNISOLONE ACETATE 80 MG: 80 INJECTION, SUSPENSION INTRA-ARTICULAR; INTRALESIONAL; INTRAMUSCULAR; SOFT TISSUE at 13:08

## 2023-06-07 NOTE — H&P (VIEW-ONLY)
Ms. Plascencia comes in today for follow-up.  Injections have worked well in the past.  The patient would like to get repeat injections today.  The risks, benefits and alternatives were discussed and the patient consented.  Going forward, the patient will follow-up as needed.    Venkata Hamilton MD    06/07/2023    Large Joint Arthrocentesis: R subacromial bursa  Date/Time: 6/7/2023 1:08 PM  Consent given by: patient  Site marked: site marked  Timeout: Immediately prior to procedure a time out was called to verify the correct patient, procedure, equipment, support staff and site/side marked as required   Supporting Documentation  Indications: pain   Procedure Details  Location: shoulder - R subacromial bursa  Preparation: Patient was prepped and draped in the usual sterile fashion  Needle gauge: 21G.  Approach: posterior  Medications administered: 80 mg methylPREDNISolone acetate 80 MG/ML; 2 mL lidocaine PF 1% 1 %  Patient tolerance: patient tolerated the procedure well with no immediate complications      Large Joint Arthrocentesis: L subacromial bursa  Date/Time: 6/7/2023 1:08 PM  Consent given by: patient  Site marked: site marked  Timeout: Immediately prior to procedure a time out was called to verify the correct patient, procedure, equipment, support staff and site/side marked as required   Supporting Documentation  Indications: pain   Procedure Details  Location: shoulder - L subacromial bursa  Preparation: Patient was prepped and draped in the usual sterile fashion  Needle gauge: 21G.  Approach: posterior  Medications administered: 80 mg methylPREDNISolone acetate 80 MG/ML; 2 mL lidocaine PF 1% 1 %  Patient tolerance: patient tolerated the procedure well with no immediate complications

## 2023-06-13 ENCOUNTER — HOSPITAL ENCOUNTER (OUTPATIENT)
Dept: ULTRASOUND IMAGING | Facility: HOSPITAL | Age: 83
Discharge: HOME | End: 2023-06-13
Payer: MEDICARE

## 2023-06-13 VITALS
WEIGHT: 203.8 LBS | HEIGHT: 63 IN | DIASTOLIC BLOOD PRESSURE: 89 MMHG | OXYGEN SATURATION: 95 % | SYSTOLIC BLOOD PRESSURE: 128 MMHG | HEART RATE: 85 BPM | TEMPERATURE: 97.8 F | BODY MASS INDEX: 36.11 KG/M2 | RESPIRATION RATE: 18 BRPM

## 2023-06-13 DIAGNOSIS — D37.030 NEOPLASM OF UNCERTAIN BEHAVIOR OF PAROTID SALIVARY GLAND: ICD-10-CM

## 2023-06-13 PROCEDURE — 0 LIDOCAINE 1 % SOLUTION: Performed by: RADIOLOGY

## 2023-06-13 PROCEDURE — 63710000001 ALPRAZOLAM 0.5 MG TABLET: Performed by: RADIOLOGY

## 2023-06-13 PROCEDURE — A9270 NON-COVERED ITEM OR SERVICE: HCPCS | Performed by: RADIOLOGY

## 2023-06-13 PROCEDURE — 76942 ECHO GUIDE FOR BIOPSY: CPT

## 2023-06-13 RX ORDER — LIDOCAINE HYDROCHLORIDE 10 MG/ML
10 INJECTION, SOLUTION INFILTRATION; PERINEURAL ONCE
Status: COMPLETED | OUTPATIENT
Start: 2023-06-13 | End: 2023-06-13

## 2023-06-13 RX ORDER — ALPRAZOLAM 0.5 MG/1
0.5 TABLET ORAL ONCE
Status: COMPLETED | OUTPATIENT
Start: 2023-06-13 | End: 2023-06-13

## 2023-06-13 RX ADMIN — ALPRAZOLAM 0.5 MG: 0.5 TABLET ORAL at 13:10

## 2023-06-13 RX ADMIN — LIDOCAINE HYDROCHLORIDE 2 ML: 10 INJECTION, SOLUTION INFILTRATION; PERINEURAL at 14:08

## 2023-06-13 NOTE — INTERVAL H&P NOTE
H&P reviewed. The patient was examined and there are no changes to the H&P.   Unremark.for intend.proc.

## 2023-06-13 NOTE — NURSING NOTE
Pt d/maureen via w/c per Dada. No sx or symptoms of distress noted. No c/o voiced at this time. Pt's son, Deric, to drive pt home.

## 2023-06-13 NOTE — DISCHARGE INSTRUCTIONS
EDUCATION / DISCHARGE INSTRUCTIONS  Aspiration:  An aspiration is a procedure used to take a sample of cells or tissue from a lump, mass or other area of concern.   Within a week the radiologist will send a report to your physician.  A pathologist will also examine the tissue and send a report.    Post Procedure:    *  Rest today (no pushing pulling or straining).   *  Slowly increase activity tomorow.    *  If you received sedation do not drive for 24 hours.   *  Keep dressing clean and dry.   *  Leave dressing on puncture site for 24 hours.    *  You may shower when dressing removed.   *  If needed, use an ice pack at the site for up to 20 minutes at a time for pain.    Call your doctor if experiencing:   *  Signs of infection such as redness, swelling, excessive pain and / or foul smelling drainage from the puncture site.   *  Chills or fever over 101 degrees (by mouth).   *  Unrelieved pain.   *  Any new or severe symptoms.      Following the procedure:     Follow-up with the ordering physician as directed.    Continue to take other medications as directed by your physician unless  otherwise instructed.      If you have any concerns please call the Radiology Nurses Desk at [phone].  You are the most important factor in your recovery.  Follow the above instructions carefully.

## 2023-06-14 ENCOUNTER — TELEPHONE (OUTPATIENT)
Dept: INTERVENTIONAL RADIOLOGY/VASCULAR | Facility: HOSPITAL | Age: 83
End: 2023-06-14
Payer: MEDICARE

## 2023-06-15 LAB
DX PRELIMINARY: NORMAL
LAB AP CASE REPORT: NORMAL
LAB AP CLINICAL INFORMATION: NORMAL
LAB AP DIAGNOSIS COMMENT: NORMAL
LAB AP SPECIAL STAINS: NORMAL
PATH REPORT.GROSS SPEC: NORMAL

## 2023-06-20 LAB
DX PRELIMINARY: NORMAL
LAB AP CASE REPORT: NORMAL
LAB AP CLINICAL INFORMATION: NORMAL
LAB AP DIAGNOSIS COMMENT: NORMAL
LAB AP SPECIAL STAINS: NORMAL
PATH REPORT.FINAL DX SPEC: NORMAL
PATH REPORT.GROSS SPEC: NORMAL

## 2023-08-07 NOTE — PROGRESS NOTES
REASON FOR CONSULTATION: Potential lymphoma involving parotid gland                               REQUESTING PHYSICIAN: Katarzyna Bland MD, Alek Brunson MD    RECORDS OBTAINED:  Records of the patients history including those obtained from the referring provider were reviewed and summarized in detail.    HISTORY OF PRESENT ILLNESS:  The patient is a 82 y.o. year old female who is here for an opinion about the above issue.    History of Present Illness     The patient is an  82-year-old female followed by primary care for the below medical disorders who had undergone studies recently including February 20, 2023 when she had increasing shortness of breath with a CTA that failed to show any evidence of pulmonary embolism.  Additional duplex lower extremities included left popliteal fossa, normal bilateral lower extremity studies and, with a history of idiopathic pulmonary fibrosis a high-resolution chest CT 4/11/2023 showed bibasilar atelectasis, curvilinear opacity in the right lower lobe and subpleural reticular nodule opacities, calcified and noncalcified micronodules without substantial change from previous.    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  There were mildly prominent nodes throughout the neck but mostly within the right level 2 and right level 3 regions?  Metastatic adenopathy.      These findings led to an ultrasound-guided lymph node biopsy 6/13/2023 that was felt to be an atypical lymphoid infiltrate that, upon pathologic review by [facility] labs, was concerning for a T-cell malignancy such that the entire mass was was felt to require removal.  Immunohistochemical stains are essentially negative  "but significant for CD3 and CD5, interspersed CD20, Bcl-2 positive BCx and cyclin D1 negative.    n additional biopsy obtained now core sampling reveals lymphoid tissue with interfollicular expansion by an atypical mixed lymphohistiocytic and eosinophilic infiltrate with a background of reactive lymphoid hyperplasia.      Please note both the biopsies suggest excisional biopsy.      Concurrently she has considerable orthopedic issues including suspected bilateral rotator cuff tears assessed in April treated with injection therapy 6/7/2023 through orthopedics.       We are asked to see the patient for her potential lymphoma.  She indicates that she has been having symptoms for several months at least from December of general weakness and fatigue as her analysis has been continuing.  Please see review of systems below.  She had noted over the 2 to 3-month period of development of right parotid gland swelling nonpainful and not progressing clearly in size more recently but clearly noticeable over the 2 to 3 months.  As result of the biopsy the area has \"changed\".  She has not had fever, chills, night sweats but clearly reduction in performance status.  She indicates that some of this was affected by development of COVID from which she thought she had recovered.    Additional family history include the parotid gland enlargement-benign-and her daughter who attends with her in office visit 8/8/2023 in the history and her son of high-grade lymphoma diagnosed 1985 treated with apparent [facility] chemotherapy.  He has not had a relapse.      Past Medical History:   Diagnosis Date    Anxiety     Arthritis     C. difficile colitis     Disease of thyroid gland     Diverticulitis     GERD (gastroesophageal reflux disease)     H/O uterine prolapse     Hyperlipidemia     Hypertension     Hypothyroidism     Knee swelling 2011    Had right knee replaced first . had to choose which knee first    Osteopenia     Prediabetes     Scoliosis  "    Shoulder injury     Uterine prolapse         Past Surgical History:   Procedure Laterality Date    CHOLECYSTECTOMY      COLONOSCOPY      ENDOSCOPY      ENDOSCOPY N/A 05/25/2021    Procedure: ESOPHAGOGASTRODUODENOSCOPY WITH [address] DILATION;  Surgeon: Tripp Pascal MD;  Location: [facility] ENDOSCOPY;  Service: Gastroenterology;  Laterality: N/A;  PRE- DYSPHAGIA  POST- ESOPHAGEAL RING    JOINT REPLACEMENT Right     KNEE    KNEE SURGERY      TONSILLECTOMY      TOTAL KNEE ARTHROPLASTY Left 11/22/2022    Procedure: LEFT TOTAL KNEE ARTHROPLASTY;  Surgeon: Antonio Riggins MD;  Location: [facility] OR [facility];  Service: Orthopedics;  Laterality: Left;        Current Outpatient Medications on File Prior to Visit   Medication Sig Dispense Refill    atorvastatin (LIPITOR) 10 MG tablet Take 1 tablet by mouth Daily.      celecoxib (CeleBREX) 200 MG capsule       nystatin-triamcinolone (MYCOLOG) 707550-4.1 UNIT/GM-% ointment nystatin-triamcinolone 100,000 unit/gram-0.1 % topical ointment      pantoprazole (PROTONIX) 40 MG EC tablet Take 1 tablet by mouth As Needed.      Synthroid 100 MCG tablet       vitamin C (ASCORBIC ACID) 500 MG tablet Take 1 tablet by mouth Daily.      zolpidem (AMBIEN) 5 MG tablet Take 1 tablet by mouth At Night As Needed for Sleep.      Cholecalciferol (VITAMIN D3) 5000 UNITS capsule capsule Take 1 capsule by mouth Daily.      escitalopram (LEXAPRO) 10 MG tablet Take 5 mg by mouth Daily.      LORazepam (ATIVAN) 0.5 MG tablet Take 0.25-0.5 mg by mouth 2 (Two) Times a Day As Needed.      polyethylene glycol (MIRALAX) 17 GM/SCOOP powder Take 17 g by mouth Daily.      promethazine (PHENERGAN) 25 MG tablet As Needed.      ramipril (ALTACE) 5 MG capsule Take 1 capsule by mouth Daily.      venlafaxine XR (EFFEXOR-XR) 37.5 MG 24 hr capsule TAKE 1 CAPSULE BY MOUTH EVERY DAY FOR 2 WEEKS      venlafaxine XR (EFFEXOR-XR) 75 MG 24 hr capsule Take 1 capsule by mouth Daily. (Patient not taking: Reported on  "2023)       No current facility-administered medications on file prior to visit.        ALLERGIES:    Allergies   Allergen Reactions    Sulfa Antibiotics Itching        Social History     Socioeconomic History    Marital status: Single   Tobacco Use    Smoking status: Never    Smokeless tobacco: Never   Vaping Use    Vaping Use: Never used   Substance and Sexual Activity    Alcohol use: Not Currently    Drug use: Never    Sexual activity: Not Currently     Partners: Male     Birth control/protection: None     Comment: N/A        Family History   Problem Relation Age of Onset    Cancer Mother          1964    Early death Mother     Heart attack Father     Heart disease Father     Migraines Daughter     Malig Hyperthermia Neg Hx         Review of Systems   Constitutional:  Positive for activity change, fatigue and unexpected weight change (Weight gain- 35 lbs - 2 years).   HENT:  Positive for facial swelling. Negative for dental problem, ear discharge, ear pain and trouble swallowing.    Eyes: Negative.    Respiratory:  Positive for cough, shortness of breath and wheezing. Negative for chest tightness.    Cardiovascular:  Positive for leg swelling.   Gastrointestinal:  Positive for constipation.        Esophogeal stricture   Endocrine: Positive for heat intolerance.   Genitourinary:  Positive for frequency.   Musculoskeletal:  Positive for arthralgias (Shoulders- bilaterally) and joint swelling.   Neurological: Negative.    Psychiatric/Behavioral:  The patient is nervous/anxious.       Objective     Vitals:    23 1056   BP: 122/80   Pulse: 88   Resp: 18   Temp: 97.3 øF (36.3 øC)   TempSrc: Temporal   SpO2: 96%   Weight: 97.8 kg (215 lb 11.2 oz)   Height: 160 cm (62.99\")   PainSc:   4   PainLoc: Generalized         2023    10:59 AM   Current Status   ECOG score 1       Physical Exam  Constitutional:       Appearance: She is obese.   HENT:      Head: Normocephalic and atraumatic.      Nose: Nose " normal.      Mouth/Throat:      Mouth: Mucous membranes are moist.   Eyes:      Extraocular Movements: Extraocular movements intact.      Conjunctiva/sclera: Conjunctivae normal.      Pupils: Pupils are equal, round, and reactive to light.   Neck:      Comments: Enlarged right parotid gland approximately 3 to 4 cm greatest diameter, nontender  Cardiovascular:      Rate and Rhythm: Normal rate and regular rhythm.      Pulses: Normal pulses.      Heart sounds: Normal heart sounds.   Pulmonary:      Effort: Pulmonary effort is normal.      Breath sounds: Normal breath sounds.   Abdominal:      General: Bowel sounds are normal.      Palpations: Abdomen is soft.   Musculoskeletal:      Cervical back: Normal range of motion.      Right lower leg: Edema (Trace to 1+) present.      Left lower leg: Edema (Trace to 1+) present.   Skin:     General: Skin is warm and dry.   Neurological:      General: No focal deficit present.      Mental Status: She is oriented to person, place, and time.   Psychiatric:         Mood and Affect: Mood normal.         Behavior: Behavior normal.         RECENT LABS:  Hematology WBC   Date Value Ref Range Status   08/08/2023 7.52 3.40 - 10.80 10*3/mm3 Final     RBC   Date Value Ref Range Status   08/08/2023 4.07 3.77 - 5.28 10*6/mm3 Final     Hemoglobin   Date Value Ref Range Status   08/08/2023 12.8 12.0 - 15.9 g/dL Final     Hematocrit   Date Value Ref Range Status   08/08/2023 37.8 34.0 - 46.6 % Final     Platelets   Date Value Ref Range Status   08/08/2023 213 140 - 450 10*3/mm3 Final          Assessment & Plan   The patient is a 82-year-old female:    with a history of arthritis, diverticulitis, GE reflux, hyperlipidemia, hypertension, hypothyroidism, prediabetes, shoulder injury and studies, more recently, demonstrating a mass and superficial of the right parotid gland that was concerning either benign or malignant tumor versus sialocele.  There is minimal lymphadenopathy throughout the neck  otherwise and additional scans done, including those for IPF, demonstrate subpleural reticular nodular opacities and noncalcified micronodules.    These findings led to ultrasound-guided lymph node biopsy 6/13/2023 that was felt to be an atypical lymphoid infiltrate that, upon pathologic review by [facility] labs, was concerning for a T-cell malignancy such that the entire mass was was felt to require removal.  Immunohistochemical stains are essentially negative but significant for CD3 and CD5, interspersed CD20, Bcl-2 positive BCx and cyclin D1 negative.    An additional biopsy obtained now core sampling reveals lymphoid tissue with interfollicular expansion by an atypical mixed lymphohistiocytic and eosinophilic infiltrate with a background of reactive lymphoid hyperplasia.      Please note both the biopsies suggest excisional biopsy.    The patient is seen with her daughter and we have had a lengthy conversation as to how to proceed.  At this point it may be more prudent to have her staged as if she had been diagnosed with lymphoma looking for other sites that might be more accessible for biopsy.    Plan:  *Return to laboratory for TSH, sed rate, rheumatoid factor, LDH, FINA, PE, free serum light chains, CRP, TIGRE, amylase    *Schedule CT of neck, chest, abdomen, pelvis in 2 weeks    *MD follow-up 3 weeks prior to her ENT follow-up.    *At that point we can possibly determine as to how to proceed including follow-up on her parotid gland.    *Patient and her daughter who was seen in follow-up.      I spent 55 minutes caring for Madisyn on this date of service. This time includes time spent by me in the following activities: preparing for the visit, reviewing tests, obtaining and/or reviewing a separately obtained history, performing a medically appropriate examination and/or evaluation, counseling and educating the patient/family/caregiver, ordering medications, tests, or procedures, referring and communicating with other  health care professionals, documenting information in the medical record, independently interpreting results and communicating that information with the patient/family/caregiver, and care coordination.

## 2023-08-08 ENCOUNTER — CONSULT (OUTPATIENT)
Dept: ONCOLOGY | Facility: CLINIC | Age: 83
End: 2023-08-08
Payer: MEDICARE

## 2023-08-08 ENCOUNTER — LAB (OUTPATIENT)
Dept: OTHER | Facility: HOSPITAL | Age: 83
End: 2023-08-08
Payer: MEDICARE

## 2023-08-08 VITALS
HEART RATE: 88 BPM | OXYGEN SATURATION: 96 % | DIASTOLIC BLOOD PRESSURE: 80 MMHG | HEIGHT: 63 IN | RESPIRATION RATE: 18 BRPM | BODY MASS INDEX: 38.22 KG/M2 | WEIGHT: 215.7 LBS | SYSTOLIC BLOOD PRESSURE: 122 MMHG | TEMPERATURE: 97.3 F

## 2023-08-08 DIAGNOSIS — K11.8 PAROTID MASS: Primary | ICD-10-CM

## 2023-08-08 DIAGNOSIS — C85.91: Primary | ICD-10-CM

## 2023-08-08 DIAGNOSIS — U09.9 POST COVID-19 CONDITION, UNSPECIFIED: ICD-10-CM

## 2023-08-08 LAB
ALBUMIN SERPL-MCNC: 4 G/DL (ref 3.5–5.2)
ALBUMIN/GLOB SERPL: 1.3 G/DL
ALP SERPL-CCNC: 96 U/L (ref 39–117)
ALT SERPL W P-5'-P-CCNC: 22 U/L (ref 1–33)
AMYLASE SERPL-CCNC: 66 U/L (ref 28–100)
ANION GAP SERPL CALCULATED.3IONS-SCNC: 9.6 MMOL/L (ref 5–15)
AST SERPL-CCNC: 24 U/L (ref 1–32)
BASOPHILS # BLD AUTO: 0.04 10*3/MM3 (ref 0–0.2)
BASOPHILS NFR BLD AUTO: 0.5 % (ref 0–1.5)
BILIRUB SERPL-MCNC: 0.4 MG/DL (ref 0–1.2)
BUN SERPL-MCNC: 19 MG/DL (ref 8–23)
BUN/CREAT SERPL: 22.1 (ref 7–25)
CALCIUM SPEC-SCNC: 9.9 MG/DL (ref 8.6–10.5)
CHLORIDE SERPL-SCNC: 105 MMOL/L (ref 98–107)
CHROMATIN AB SERPL-ACNC: <10 IU/ML (ref 0–14)
CO2 SERPL-SCNC: 25.4 MMOL/L (ref 22–29)
CREAT SERPL-MCNC: 0.86 MG/DL (ref 0.57–1)
CRP SERPL-MCNC: <0.3 MG/DL (ref 0–0.5)
DEPRECATED RDW RBC AUTO: 48.9 FL (ref 37–54)
EGFRCR SERPLBLD CKD-EPI 2021: 67.5 ML/MIN/1.73
EOSINOPHIL # BLD AUTO: 0.29 10*3/MM3 (ref 0–0.4)
EOSINOPHIL NFR BLD AUTO: 3.9 % (ref 0.3–6.2)
ERYTHROCYTE [DISTWIDTH] IN BLOOD BY AUTOMATED COUNT: 14.4 % (ref 12.3–15.4)
ERYTHROCYTE [SEDIMENTATION RATE] IN BLOOD: 12 MM/HR (ref 0–30)
GLOBULIN UR ELPH-MCNC: 3.2 GM/DL
GLUCOSE SERPL-MCNC: 128 MG/DL (ref 65–99)
HCT VFR BLD AUTO: 37.8 % (ref 34–46.6)
HGB BLD-MCNC: 12.8 G/DL (ref 12–15.9)
IMM GRANULOCYTES # BLD AUTO: 0.04 10*3/MM3 (ref 0–0.05)
IMM GRANULOCYTES NFR BLD AUTO: 0.5 % (ref 0–0.5)
LDH SERPL-CCNC: 237 U/L (ref 135–214)
LYMPHOCYTES # BLD AUTO: 1.52 10*3/MM3 (ref 0.7–3.1)
LYMPHOCYTES NFR BLD AUTO: 20.2 % (ref 19.6–45.3)
MCH RBC QN AUTO: 31.4 PG (ref 26.6–33)
MCHC RBC AUTO-ENTMCNC: 33.9 G/DL (ref 31.5–35.7)
MCV RBC AUTO: 92.9 FL (ref 79–97)
MONOCYTES # BLD AUTO: 0.66 10*3/MM3 (ref 0.1–0.9)
MONOCYTES NFR BLD AUTO: 8.8 % (ref 5–12)
NEUTROPHILS NFR BLD AUTO: 4.97 10*3/MM3 (ref 1.7–7)
NEUTROPHILS NFR BLD AUTO: 66.1 % (ref 42.7–76)
NRBC BLD AUTO-RTO: 0 /100 WBC (ref 0–0.2)
PLATELET # BLD AUTO: 213 10*3/MM3 (ref 140–450)
PMV BLD AUTO: 9.8 FL (ref 6–12)
POTASSIUM SERPL-SCNC: 4 MMOL/L (ref 3.5–5.2)
PROT SERPL-MCNC: 7.2 G/DL (ref 6–8.5)
RBC # BLD AUTO: 4.07 10*6/MM3 (ref 3.77–5.28)
SODIUM SERPL-SCNC: 140 MMOL/L (ref 136–145)
TSH SERPL DL<=0.05 MIU/L-ACNC: 2.03 UIU/ML (ref 0.27–4.2)
WBC NRBC COR # BLD: 7.52 10*3/MM3 (ref 3.4–10.8)

## 2023-08-08 PROCEDURE — 86235 NUCLEAR ANTIGEN ANTIBODY: CPT | Performed by: INTERNAL MEDICINE

## 2023-08-08 PROCEDURE — 84165 PROTEIN E-PHORESIS SERUM: CPT | Performed by: INTERNAL MEDICINE

## 2023-08-08 PROCEDURE — 82150 ASSAY OF AMYLASE: CPT | Performed by: INTERNAL MEDICINE

## 2023-08-08 PROCEDURE — 80053 COMPREHEN METABOLIC PANEL: CPT | Performed by: INTERNAL MEDICINE

## 2023-08-08 PROCEDURE — 36415 COLL VENOUS BLD VENIPUNCTURE: CPT | Performed by: INTERNAL MEDICINE

## 2023-08-08 PROCEDURE — 86140 C-REACTIVE PROTEIN: CPT | Performed by: INTERNAL MEDICINE

## 2023-08-08 PROCEDURE — 82784 ASSAY IGA/IGD/IGG/IGM EACH: CPT | Performed by: INTERNAL MEDICINE

## 2023-08-08 PROCEDURE — 85025 COMPLETE CBC W/AUTO DIFF WBC: CPT | Performed by: INTERNAL MEDICINE

## 2023-08-08 PROCEDURE — 85652 RBC SED RATE AUTOMATED: CPT | Performed by: INTERNAL MEDICINE

## 2023-08-08 PROCEDURE — 83521 IG LIGHT CHAINS FREE EACH: CPT | Performed by: INTERNAL MEDICINE

## 2023-08-08 PROCEDURE — 86225 DNA ANTIBODY NATIVE: CPT | Performed by: INTERNAL MEDICINE

## 2023-08-08 PROCEDURE — 86431 RHEUMATOID FACTOR QUANT: CPT | Performed by: INTERNAL MEDICINE

## 2023-08-08 PROCEDURE — 84443 ASSAY THYROID STIM HORMONE: CPT | Performed by: INTERNAL MEDICINE

## 2023-08-08 PROCEDURE — 86334 IMMUNOFIX E-PHORESIS SERUM: CPT | Performed by: INTERNAL MEDICINE

## 2023-08-08 PROCEDURE — 83615 LACTATE (LD) (LDH) ENZYME: CPT | Performed by: INTERNAL MEDICINE

## 2023-08-09 ENCOUNTER — PATIENT ROUNDING (BHMG ONLY) (OUTPATIENT)
Dept: ONCOLOGY | Facility: CLINIC | Age: 83
End: 2023-08-09
Payer: MEDICARE

## 2023-08-09 LAB
ALBUMIN SERPL ELPH-MCNC: 3.6 G/DL (ref 2.9–4.4)
ALBUMIN/GLOB SERPL: 1.3 {RATIO} (ref 0.7–1.7)
ALPHA1 GLOB SERPL ELPH-MCNC: 0.3 G/DL (ref 0–0.4)
ALPHA2 GLOB SERPL ELPH-MCNC: 0.8 G/DL (ref 0.4–1)
B-GLOBULIN SERPL ELPH-MCNC: 1 G/DL (ref 0.7–1.3)
CENTROMERE B AB SER-ACNC: <0.2 AI (ref 0–0.9)
CHROMATIN AB SERPL-ACNC: <0.2 AI (ref 0–0.9)
DSDNA AB SER-ACNC: <1 IU/ML (ref 0–9)
ENA JO1 AB SER-ACNC: <0.2 AI (ref 0–0.9)
ENA RNP AB SER-ACNC: <0.2 AI (ref 0–0.9)
ENA SCL70 AB SER-ACNC: 2 AI (ref 0–0.9)
ENA SM AB SER-ACNC: <0.2 AI (ref 0–0.9)
ENA SS-A AB SER-ACNC: <0.2 AI (ref 0–0.9)
ENA SS-B AB SER-ACNC: <0.2 AI (ref 0–0.9)
GAMMA GLOB SERPL ELPH-MCNC: 0.9 G/DL (ref 0.4–1.8)
GLOBULIN SER-MCNC: 3 G/DL (ref 2.2–3.9)
IGA SERPL-MCNC: 199 MG/DL (ref 64–422)
IGG SERPL-MCNC: 875 MG/DL (ref 586–1602)
IGM SERPL-MCNC: 43 MG/DL (ref 26–217)
INTERPRETATION SERPL IEP-IMP: ABNORMAL
KAPPA LC FREE SER-MCNC: 22.1 MG/L (ref 3.3–19.4)
KAPPA LC FREE/LAMBDA FREE SER: 1.8 {RATIO} (ref 0.26–1.65)
LABORATORY COMMENT REPORT: ABNORMAL
LAMBDA LC FREE SERPL-MCNC: 12.3 MG/L (ref 5.7–26.3)
Lab: ABNORMAL
M PROTEIN SERPL ELPH-MCNC: ABNORMAL G/DL
PROT SERPL-MCNC: 6.6 G/DL (ref 6–8.5)

## 2023-08-09 NOTE — PROGRESS NOTES
A My-Chart message has been sent to the patient for PATIENT ROUNDING with [facility].

## 2023-08-19 ENCOUNTER — HOSPITAL ENCOUNTER (OUTPATIENT)
Facility: HOSPITAL | Age: 83
Discharge: HOME | End: 2023-08-19
Attending: EMERGENCY MEDICINE
Payer: MEDICARE

## 2023-08-19 ENCOUNTER — APPOINTMENT (OUTPATIENT)
Dept: GENERAL RADIOLOGY | Facility: HOSPITAL | Age: 83
End: 2023-08-19
Payer: MEDICARE

## 2023-08-19 VITALS
SYSTOLIC BLOOD PRESSURE: 115 MMHG | OXYGEN SATURATION: 96 % | DIASTOLIC BLOOD PRESSURE: 95 MMHG | BODY MASS INDEX: 38.09 KG/M2 | WEIGHT: 215 LBS | RESPIRATION RATE: 18 BRPM | TEMPERATURE: 98.5 F | HEART RATE: 80 BPM | HEIGHT: 63 IN

## 2023-08-19 DIAGNOSIS — R05.9 COUGH, UNSPECIFIED TYPE: Primary | ICD-10-CM

## 2023-08-19 PROCEDURE — G0463 HOSPITAL OUTPT CLINIC VISIT: HCPCS | Performed by: NURSE PRACTITIONER

## 2023-08-19 PROCEDURE — 71046 X-RAY EXAM CHEST 2 VIEWS: CPT

## 2023-08-19 RX ORDER — ALBUTEROL SULFATE 90 UG/1
2 AEROSOL, METERED RESPIRATORY (INHALATION) EVERY 6 HOURS PRN
Qty: 1 G | Refills: 0 | Status: SHIPPED | OUTPATIENT
Start: 2023-08-19 | End: 2023-08-22

## 2023-08-19 RX ORDER — IPRATROPIUM BROMIDE AND ALBUTEROL SULFATE 2.5; .5 MG/3ML; MG/3ML
3 SOLUTION RESPIRATORY (INHALATION) ONCE
Status: DISCONTINUED | OUTPATIENT
Start: 2023-08-19 | End: 2023-08-19

## 2023-08-19 RX ORDER — METHYLPREDNISOLONE 4 MG/1
TABLET ORAL
Qty: 21 TABLET | Refills: 0 | Status: SHIPPED | OUTPATIENT
Start: 2023-08-19

## 2023-08-19 NOTE — FSED PROVIDER NOTE
"Subjective   History of Present Illness  Patient is an 82-year-old female who presents complaining of cough and wheezing.  States her primary care told her to take Delsym which she has been but states it has not been helping.  Complains of chest tightness with coughing but denies any chest pain.  States it is a dry cough and she denies any fever or chills.  She denies any history of COPD, asthma, bronchitis but reports she has \"a touch\" of pulmonary fibrosis.    Review of Systems   Respiratory:  Positive for cough.    All other systems reviewed and are negative.    Past Medical History:   Diagnosis Date    Anxiety     Arthritis     C. difficile colitis     Disease of thyroid gland     Diverticulitis     GERD (gastroesophageal reflux disease)     H/O uterine prolapse     Hyperlipidemia     Hypertension     Hypothyroidism     Knee swelling     Had right knee replaced first . had to choose which knee first    Osteopenia     Prediabetes     Scoliosis     Shoulder injury     Uterine prolapse        Allergies   Allergen Reactions    Sulfa Antibiotics Itching       Past Surgical History:   Procedure Laterality Date    CHOLECYSTECTOMY      COLONOSCOPY      ENDOSCOPY      ENDOSCOPY N/A 2021    Procedure: ESOPHAGOGASTRODUODENOSCOPY WITH [address] DILATION;  Surgeon: Tripp Pascal MD;  Location: [facility] ENDOSCOPY;  Service: Gastroenterology;  Laterality: N/A;  PRE- DYSPHAGIA  POST- ESOPHAGEAL RING    JOINT REPLACEMENT Right     KNEE    KNEE SURGERY      TONSILLECTOMY      TOTAL KNEE ARTHROPLASTY Left 2022    Procedure: LEFT TOTAL KNEE ARTHROPLASTY;  Surgeon: Antonio Riggins MD;  Location: [facility] OR [facility];  Service: Orthopedics;  Laterality: Left;       Family History   Problem Relation Age of Onset    Cancer Mother          1964    Early death Mother     Heart attack Father     Heart disease Father     Migraines Daughter     Malig Hyperthermia Neg Hx        Social History     Socioeconomic " History    Marital status: Single   Tobacco Use    Smoking status: Never    Smokeless tobacco: Never   Vaping Use    Vaping Use: Never used   Substance and Sexual Activity    Alcohol use: Not Currently    Drug use: Never    Sexual activity: Not Currently     Partners: Male     Birth control/protection: None     Comment: N/A           Objective   Physical Exam  Vitals and nursing note reviewed.   Constitutional:       Appearance: Normal appearance.   HENT:      Head: Normocephalic and atraumatic.   Cardiovascular:      Rate and Rhythm: Normal rate and regular rhythm.      Pulses: Normal pulses.      Heart sounds: Normal heart sounds.   Pulmonary:      Effort: Pulmonary effort is normal.      Breath sounds: Normal breath sounds.   Abdominal:      General: Abdomen is flat.      Palpations: Abdomen is soft.      Tenderness: There is no abdominal tenderness.   Musculoskeletal:      Cervical back: Normal range of motion and neck supple.   Skin:     General: Skin is warm and dry.      Capillary Refill: Capillary refill takes less than 2 seconds.   Neurological:      General: No focal deficit present.      Mental Status: She is alert and oriented to person, place, and time.       Procedures           ED Course                                           Medical Decision Making  Chest x-ray findings as below:    FINDINGS: The lungs are well expanded and clear except for calcified  granuloma in the upper left lung unchanged from 1/16/2023. The heart  size remains normal with mild tortuosity of the aorta and no acute  abnormality is seen.    Patient declines any labs or further work-up.  She is nontoxic and able to maintain her saturations.  Will prescribe Ventolin inhaler and prednisone.  She is to follow-up with her primary care as needed.  Given strict return precautions.    Problems Addressed:  Cough, unspecified type: complicated acute illness or injury    Amount and/or Complexity of Data Reviewed  Radiology:  ordered.    Risk  Prescription drug management.        Final diagnoses:   Cough, unspecified type       ED Disposition  ED Disposition       ED Disposition   Discharge    Condition   Stable    Comment   --               Katarzyna Bland MD  [facility] [place] [address]  [phone]    Call   If symptoms worsen         Medication List        New Prescriptions      albuterol sulfate  (90 Base) MCG/ACT inhaler  Commonly known as: PROVENTIL HFA;VENTOLIN HFA;PROAIR HFA  Inhale 2 puffs Every 6 (Six) Hours As Needed for Wheezing.     methylPREDNISolone 4 MG dose pack  Commonly known as: MEDROL  Take as directed on package instructions.               Where to Get Your Medications        These medications were sent to [facility]/pharmacy #[id number] [place], [place] - [address] AT [facility] [phone]  - [phone]   [address] [facility] [place] [id number]      Phone: [phone]   albuterol sulfate  (90 Base) MCG/ACT inhaler  methylPREDNISolone 4 MG dose pack

## 2023-08-22 ENCOUNTER — HOSPITAL ENCOUNTER (OUTPATIENT)
Facility: HOSPITAL | Age: 83
Discharge: HOME | End: 2023-08-22
Admitting: INTERNAL MEDICINE
Payer: MEDICARE

## 2023-08-22 DIAGNOSIS — K11.8 PAROTID MASS: ICD-10-CM

## 2023-08-22 PROCEDURE — 0 DIATRIZOATE MEGLUMINE & SODIUM PER 1 ML: Performed by: INTERNAL MEDICINE

## 2023-08-22 PROCEDURE — 70491 CT SOFT TISSUE NECK W/DYE: CPT

## 2023-08-22 PROCEDURE — 74177 CT ABD & PELVIS W/CONTRAST: CPT

## 2023-08-22 PROCEDURE — 71260 CT THORAX DX C+: CPT

## 2023-08-22 RX ADMIN — DIATRIZOATE MEGLUMINE AND DIATRIZOATE SODIUM 30 ML: 600; 100 SOLUTION ORAL; RECTAL at 14:09

## 2023-08-28 NOTE — PROGRESS NOTES
REASON FOR CONSULTATION: Potential lymphoma involving parotid gland                               REQUESTING PHYSICIAN: Katarzyna Bland MD, Alek Brunson MD    RECORDS OBTAINED:  Records of the patients history including those obtained from the referring provider were reviewed and summarized in detail.    HISTORY OF PRESENT ILLNESS:  The patient is a 82 y.o. year old female who is here for an opinion about the above issue.    History of Present Illness     The patient is an  82-year-old female followed by primary care for the below medical disorders who had undergone studies recently including February 20, 2023 when she had increasing shortness of breath with a CTA that failed to show any evidence of pulmonary embolism.  Additional duplex lower extremities included left popliteal fossa, normal bilateral lower extremity studies and, with a history of idiopathic pulmonary fibrosis a high-resolution chest CT 4/11/2023 showed bibasilar atelectasis, curvilinear opacity in the right lower lobe and subpleural reticular nodule opacities, calcified and noncalcified micronodules without substantial change from previous.    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  There were mildly prominent nodes throughout the neck but mostly within the right level 2 and right level 3 regions?  Metastatic adenopathy.      These findings led to an ultrasound-guided lymph node biopsy 6/13/2023 that was felt to be an atypical lymphoid infiltrate that, upon pathologic review by [facility] labs, was concerning for a T-cell malignancy such that the entire mass was was felt to require removal.  Immunohistochemical stains are essentially negative  "but significant for CD3 and CD5, interspersed CD20, Bcl-2 positive BCx and cyclin D1 negative.    n additional biopsy obtained now core sampling reveals lymphoid tissue with interfollicular expansion by an atypical mixed lymphohistiocytic and eosinophilic infiltrate with a background of reactive lymphoid hyperplasia.      Please note both the biopsies suggest excisional biopsy.      Concurrently she has considerable orthopedic issues including suspected bilateral rotator cuff tears assessed in April treated with injection therapy 6/7/2023 through orthopedics.       We are asked to see the patient for her potential lymphoma.  She indicates that she has been having symptoms for several months at least from December of general weakness and fatigue as her analysis has been continuing.  Please see review of systems below.  She had noted over the 2 to 3-month period of development of right parotid gland swelling nonpainful and not progressing clearly in size more recently but clearly noticeable over the 2 to 3 months.  As result of the biopsy the area has \"changed\".  She has not had fever, chills, night sweats but clearly reduction in performance status.  She indicates that some of this was affected by development of COVID from which she thought she had recovered.    Additional family history include the parotid gland enlargement-benign-and her daughter who attends with her in office visit 8/8/2023 in the history and her son of high-grade lymphoma diagnosed 1985 treated with apparent [facility] chemotherapy.  He has not had a relapse.    The patient underwent studies including normal CBC, CMP, , sed rate of 12, CRP of less than 0.3, paraprotein analysis with no monoclonal spike, free kappa light chain 22.1, free lambda light chain 12.3 and ratio of 1.80, RF less than 10, TIGRE comprehensive antiscleroderma-70 antibody of 2.0, amylase of 66, TSH of 2.30 and CT of neck, chest, abdomen and pelvis.  CTs demonstrate a mass in " the posterior inferior tip of the superficial lobe of the right parotid gland is unchanged to equivocally larger 1 2 mm from 5/12/2023 measuring 2.3 x 1.7 x 2.5 compared to 2.1 x 1.6 x 2.5 obtained 5/12/2023.  There is a stable 5 mm lymph node just inferior to the posterior inferior tip of the superficial lobe of the right parotid gland and a 12 x 11 mm lymph node in superior hyoid right jugulodigastric chain thought to be reactive, mild asymmetric prominence right right inguinal tonsils compared to the left, stable cervical spondylosis with posterior posterior disc osteophyte complex with moderate central canal narrowing C5-6, posterior spurs with adjacent ossification with moderate severe canal narrowing at C6/7 and bilateral foraminal narrowing at C5-6 and C6-7.    CT chest, abdomen, pelvis with no lymphadenopathy, normal splenic size, extensive pelvic floor relaxation with probable rectocele and some degree of vaginal prolapse.    The patient is seen 8/29/2023 unfortunately with a refractory cough.  This has been reviewed by both primary care and urgent care, treated with steroids for a potential viral illness.  We have discussed her findings thus far and she is to be seen by ENT 8/30/2023 likely for further evaluation of her parotid gland perhaps by resection.      Past Medical History:   Diagnosis Date    Anxiety     Arthritis     C. difficile colitis     Disease of thyroid gland     Diverticulitis     GERD (gastroesophageal reflux disease)     H/O uterine prolapse     Hyperlipidemia     Hypertension     Hypothyroidism     Knee swelling 2011    Had right knee replaced first . had to choose which knee first    Osteopenia     Prediabetes     Scoliosis     Shoulder injury     Uterine prolapse         Past Surgical History:   Procedure Laterality Date    CHOLECYSTECTOMY      COLONOSCOPY      ENDOSCOPY      ENDOSCOPY N/A 05/25/2021    Procedure: ESOPHAGOGASTRODUODENOSCOPY WITH [address] DILATION;  Surgeon:  Tripp Pascal MD;  Location: [facility] ENDOSCOPY;  Service: Gastroenterology;  Laterality: N/A;  PRE- DYSPHAGIA  POST- ESOPHAGEAL RING    JOINT REPLACEMENT Right     KNEE    KNEE SURGERY      TONSILLECTOMY      TOTAL KNEE ARTHROPLASTY Left 11/22/2022    Procedure: LEFT TOTAL KNEE ARTHROPLASTY;  Surgeon: Antonio Riggins MD;  Location: [facility] OR [facility];  Service: Orthopedics;  Laterality: Left;        Current Outpatient Medications on File Prior to Visit   Medication Sig Dispense Refill    atorvastatin (LIPITOR) 10 MG tablet Take 1 tablet by mouth Daily.      benzonatate (TESSALON) 100 MG capsule Take 1 capsule by mouth Every 12 (Twelve) Hours.      celecoxib (CeleBREX) 200 MG capsule       Cholecalciferol (VITAMIN D3) 5000 UNITS capsule capsule Take 1 capsule by mouth Daily.      nystatin-triamcinolone (MYCOLOG) 009378-7.1 UNIT/GM-% ointment nystatin-triamcinolone 100,000 unit/gram-0.1 % topical ointment      pantoprazole (PROTONIX) 40 MG EC tablet Take 1 tablet by mouth As Needed.      polyethylene glycol (MIRALAX) 17 GM/SCOOP powder Take 17 g by mouth Daily.      Synthroid 100 MCG tablet       vitamin C (ASCORBIC ACID) 500 MG tablet Take 1 tablet by mouth Daily.      zolpidem (AMBIEN) 5 MG tablet Take 1 tablet by mouth At Night As Needed for Sleep.      methylPREDNISolone (MEDROL) 4 MG dose pack Take as directed on package instructions. 21 tablet 0     No current facility-administered medications on file prior to visit.        ALLERGIES:    Allergies   Allergen Reactions    Sulfa Antibiotics Itching     Topical        Social History     Socioeconomic History    Marital status: Single   Tobacco Use    Smoking status: Never    Smokeless tobacco: Never   Vaping Use    Vaping Use: Never used   Substance and Sexual Activity    Alcohol use: Not Currently    Drug use: Never    Sexual activity: Not Currently     Partners: Male     Birth control/protection: None     Comment: N/A        Family History   Problem Relation Age  "of Onset    Cancer Mother          1964    Early death Mother     Heart attack Father     Heart disease Father     Migraines Daughter     Malig Hyperthermia Neg Hx         Review of Systems   Constitutional:  Positive for activity change, fatigue and unexpected weight change (Weight gain- 35 lbs - 2 years).   HENT:  Positive for facial swelling. Negative for dental problem, ear discharge, ear pain and trouble swallowing.    Eyes: Negative.    Respiratory:  Positive for cough, shortness of breath and wheezing. Negative for chest tightness.    Cardiovascular:  Positive for leg swelling.   Gastrointestinal:  Positive for constipation.        Esophogeal stricture   Endocrine: Positive for heat intolerance.   Genitourinary:  Positive for frequency.   Musculoskeletal:  Positive for arthralgias (Shoulders- bilaterally) and joint swelling.   Neurological: Negative.    Psychiatric/Behavioral:  The patient is nervous/anxious.       Objective     Vitals:    23 1601   BP: 132/84   Pulse: 97   Resp: 18   Temp: 97.5 øF (36.4 øC)   TempSrc: Temporal   SpO2: 94%   Weight: 97.5 kg (215 lb)   Height: 160 cm (62.99\")   PainSc: 0-No pain         2023     4:04 PM   Current Status   ECOG score 1       Physical Exam  Constitutional:       Appearance: She is obese.   HENT:      Head: Normocephalic and atraumatic.      Nose: Nose normal.      Mouth/Throat:      Mouth: Mucous membranes are moist.   Eyes:      Extraocular Movements: Extraocular movements intact.      Conjunctiva/sclera: Conjunctivae normal.      Pupils: Pupils are equal, round, and reactive to light.   Neck:      Comments: Enlarged right parotid gland approximately 3 to 4 cm greatest diameter, nontender  Cardiovascular:      Rate and Rhythm: Normal rate and regular rhythm.      Pulses: Normal pulses.      Heart sounds: Normal heart sounds.   Pulmonary:      Effort: Pulmonary effort is normal.      Breath sounds: Normal breath sounds.   Abdominal:      " General: Bowel sounds are normal.      Palpations: Abdomen is soft.   Musculoskeletal:      Cervical back: Normal range of motion.      Right lower leg: Edema (Trace to 1+) present.      Left lower leg: Edema (Trace to 1+) present.   Skin:     General: Skin is warm and dry.   Neurological:      General: No focal deficit present.      Mental Status: She is oriented to person, place, and time.   Psychiatric:         Mood and Affect: Mood normal.         Behavior: Behavior normal.         RECENT LABS:  Hematology WBC   Date Value Ref Range Status   08/29/2023 8.02 3.40 - 10.80 10*3/mm3 Final     RBC   Date Value Ref Range Status   08/29/2023 4.08 3.77 - 5.28 10*6/mm3 Final     Hemoglobin   Date Value Ref Range Status   08/29/2023 12.7 12.0 - 15.9 g/dL Final     Hematocrit   Date Value Ref Range Status   08/29/2023 37.9 34.0 - 46.6 % Final     Platelets   Date Value Ref Range Status   08/29/2023 197 140 - 450 10*3/mm3 Final          Assessment & Plan   The patient is a 82-year-old female:    with a history of arthritis, diverticulitis, GE reflux, hyperlipidemia, hypertension, hypothyroidism, prediabetes, shoulder injury and studies, more recently, demonstrating a mass and superficial of the right parotid gland that was concerning either benign or malignant tumor versus sialocele.  There is minimal lymphadenopathy throughout the neck otherwise and additional scans done, including those for IPF, demonstrate subpleural reticular nodular opacities and noncalcified micronodules.    These findings led to ultrasound-guided lymph node biopsy 6/13/2023 that was felt to be an atypical lymphoid infiltrate that, upon pathologic review by [facility] labs, was concerning for a T-cell malignancy such that the entire mass was was felt to require removal.  Immunohistochemical stains are essentially negative but significant for CD3 and CD5, interspersed CD20, Bcl-2 positive BCx and cyclin D1 negative.    An additional biopsy obtained now core  sampling reveals lymphoid tissue with interfollicular expansion by an atypical mixed lymphohistiocytic and eosinophilic infiltrate with a background of reactive lymphoid hyperplasia.      Please note both the biopsies suggest excisional biopsy.    The patient is seen with her daughter and we have had a lengthy conversation as to how to proceed.  At this point it may be more prudent to have her staged as if she had been diagnosed with lymphoma looking for other sites that might be more accessible for biopsy.     The patient underwent studies including normal CBC, CMP, , sed rate of 12, CRP of less than 0.3, paraprotein analysis with no monoclonal spike, free kappa light chain 22.1, free lambda light chain 12.3 and ratio of 1.80, RF less than 10, TIGRE comprehensive antiscleroderma-70 antibody of 2.0, amylase of 66, TSH of 2.30 and CT of neck, chest, abdomen and pelvis.  CTs demonstrate a mass in the posterior inferior tip of the superficial lobe of the right parotid gland is unchanged to equivocally larger 1 2 mm from 5/12/2023 measuring 2.3 x 1.7 x 2.5 compared to 2.1 x 1.6 x 2.5 obtained 5/12/2023.  There is a stable 5 mm lymph node just inferior to the posterior inferior tip of the superficial lobe of the right parotid gland and a 12 x 11 mm lymph node in superior hyoid right jugulodigastric chain thought to be reactive, mild asymmetric prominence right right inguinal tonsils compared to the left, stable cervical spondylosis with posterior posterior disc osteophyte complex with moderate central canal narrowing C5-6, posterior spurs with adjacent ossification with moderate severe canal narrowing at C6/7 and bilateral foraminal narrowing at C5-6 and C6-7.    CT chest, abdomen, pelvis with no lymphadenopathy, normal splenic size, extensive pelvic floor relaxation with probable rectocele and some degree of vaginal prolapse.    The patient is seen 8/29/2023 unfortunately with a refractory cough.  This has been  reviewed by both primary care and urgent care, treated with steroids for a potential viral illness.  We have discussed her findings thus far and she is to be seen by ENT 8/30/2023 likely for further evaluation of her parotid gland perhaps by resection.    Plan:  *Patient to continue supportive care for tracheitis symptoms    *Tussionex suspension escribed to pharmacy 5 cc p.o. every 12 hours for cough suppression    *ENT assessment 8/30/2023    *MD follow-up 3 to 6 weeks    *Return to laboratory today for CBC, ESR, CRP.  CBC separately found to be normal    I spent 55 minutes caring for Madisyn on this date of service. This time includes time spent by me in the following activities: preparing for the visit, reviewing tests, obtaining and/or reviewing a separately obtained history, performing a medically appropriate examination and/or evaluation, counseling and educating the patient/family/caregiver, ordering medications, tests, or procedures, referring and communicating with other health care professionals, documenting information in the medical record, independently interpreting results and communicating that information with the patient/family/caregiver, and care coordination.

## 2023-08-29 ENCOUNTER — APPOINTMENT (OUTPATIENT)
Dept: OTHER | Facility: HOSPITAL | Age: 83
End: 2023-08-29
Payer: MEDICARE

## 2023-08-29 ENCOUNTER — OFFICE VISIT (OUTPATIENT)
Dept: ONCOLOGY | Facility: CLINIC | Age: 83
End: 2023-08-29
Payer: MEDICARE

## 2023-08-29 ENCOUNTER — LAB (OUTPATIENT)
Dept: OTHER | Facility: HOSPITAL | Age: 83
End: 2023-08-29
Payer: MEDICARE

## 2023-08-29 VITALS
TEMPERATURE: 97.5 F | WEIGHT: 215 LBS | OXYGEN SATURATION: 94 % | SYSTOLIC BLOOD PRESSURE: 132 MMHG | DIASTOLIC BLOOD PRESSURE: 84 MMHG | RESPIRATION RATE: 18 BRPM | HEIGHT: 63 IN | BODY MASS INDEX: 38.09 KG/M2 | HEART RATE: 97 BPM

## 2023-08-29 DIAGNOSIS — J04.10 ACUTE TRACHEITIS WITHOUT OBSTRUCTION: ICD-10-CM

## 2023-08-29 DIAGNOSIS — K11.8 PAROTID MASS: Primary | ICD-10-CM

## 2023-08-29 LAB
BASOPHILS # BLD AUTO: 0.04 10*3/MM3 (ref 0–0.2)
BASOPHILS NFR BLD AUTO: 0.5 % (ref 0–1.5)
CRP SERPL-MCNC: 0.4 MG/DL (ref 0–0.5)
DEPRECATED RDW RBC AUTO: 47.2 FL (ref 37–54)
EOSINOPHIL # BLD AUTO: 0.49 10*3/MM3 (ref 0–0.4)
EOSINOPHIL NFR BLD AUTO: 6.1 % (ref 0.3–6.2)
ERYTHROCYTE [DISTWIDTH] IN BLOOD BY AUTOMATED COUNT: 13.9 % (ref 12.3–15.4)
ERYTHROCYTE [SEDIMENTATION RATE] IN BLOOD: 10 MM/HR (ref 0–30)
HCT VFR BLD AUTO: 37.9 % (ref 34–46.6)
HGB BLD-MCNC: 12.7 G/DL (ref 12–15.9)
IMM GRANULOCYTES # BLD AUTO: 0.03 10*3/MM3 (ref 0–0.05)
IMM GRANULOCYTES NFR BLD AUTO: 0.4 % (ref 0–0.5)
LYMPHOCYTES # BLD AUTO: 1.23 10*3/MM3 (ref 0.7–3.1)
LYMPHOCYTES NFR BLD AUTO: 15.3 % (ref 19.6–45.3)
MCH RBC QN AUTO: 31.1 PG (ref 26.6–33)
MCHC RBC AUTO-ENTMCNC: 33.5 G/DL (ref 31.5–35.7)
MCV RBC AUTO: 92.9 FL (ref 79–97)
MONOCYTES # BLD AUTO: 0.84 10*3/MM3 (ref 0.1–0.9)
MONOCYTES NFR BLD AUTO: 10.5 % (ref 5–12)
NEUTROPHILS NFR BLD AUTO: 5.39 10*3/MM3 (ref 1.7–7)
NEUTROPHILS NFR BLD AUTO: 67.2 % (ref 42.7–76)
NRBC BLD AUTO-RTO: 0 /100 WBC (ref 0–0.2)
PLATELET # BLD AUTO: 197 10*3/MM3 (ref 140–450)
PMV BLD AUTO: 9.4 FL (ref 6–12)
RBC # BLD AUTO: 4.08 10*6/MM3 (ref 3.77–5.28)
WBC NRBC COR # BLD: 8.02 10*3/MM3 (ref 3.4–10.8)

## 2023-08-29 PROCEDURE — 86140 C-REACTIVE PROTEIN: CPT | Performed by: INTERNAL MEDICINE

## 2023-08-29 PROCEDURE — 85652 RBC SED RATE AUTOMATED: CPT | Performed by: INTERNAL MEDICINE

## 2023-08-29 PROCEDURE — 36415 COLL VENOUS BLD VENIPUNCTURE: CPT | Performed by: INTERNAL MEDICINE

## 2023-08-29 PROCEDURE — 85025 COMPLETE CBC W/AUTO DIFF WBC: CPT | Performed by: INTERNAL MEDICINE

## 2023-08-29 RX ORDER — BENZONATATE 100 MG/1
1 CAPSULE ORAL EVERY 12 HOURS SCHEDULED
Status: ON HOLD | COMMUNITY
Start: 2023-08-25 | End: 2023-09-01

## 2023-08-29 RX ORDER — HYDROCODONE POLISTIREX AND CHLORPHENIRAMINE POLISTIREX 10; 8 MG/5ML; MG/5ML
5 SUSPENSION, EXTENDED RELEASE ORAL EVERY 12 HOURS PRN
Qty: 120 ML | Refills: 0 | Status: SHIPPED | OUTPATIENT
Start: 2023-08-29

## 2023-09-01 ENCOUNTER — HOSPITAL ENCOUNTER (INPATIENT)
Facility: HOSPITAL | Age: 83
LOS: 2 days | Discharge: HOME | DRG: 202 | End: 2023-09-03
Attending: INTERNAL MEDICINE | Admitting: INTERNAL MEDICINE
Payer: MEDICARE

## 2023-09-01 ENCOUNTER — APPOINTMENT (OUTPATIENT)
Dept: GENERAL RADIOLOGY | Facility: HOSPITAL | Age: 83
DRG: 202 | End: 2023-09-01
Payer: MEDICARE

## 2023-09-01 PROBLEM — K21.9 GASTROESOPHAGEAL REFLUX DISEASE: Status: ACTIVE | Noted: 2023-09-01

## 2023-09-01 PROBLEM — I10 ESSENTIAL HYPERTENSION: Status: ACTIVE | Noted: 2023-09-01

## 2023-09-01 PROBLEM — R06.2 WHEEZING: Status: ACTIVE | Noted: 2023-09-01

## 2023-09-01 PROBLEM — J06.9 RECENT URI: Status: ACTIVE | Noted: 2023-09-01

## 2023-09-01 PROBLEM — R09.02 HYPOXIA: Status: ACTIVE | Noted: 2023-09-01

## 2023-09-01 PROBLEM — E03.9 HYPOTHYROIDISM (ACQUIRED): Status: ACTIVE | Noted: 2023-09-01

## 2023-09-01 LAB
ALBUMIN SERPL-MCNC: 4.4 G/DL (ref 3.5–5.2)
ALBUMIN/GLOB SERPL: 1.7 G/DL
ALP SERPL-CCNC: 96 U/L (ref 39–117)
ALT SERPL W P-5'-P-CCNC: 27 U/L (ref 1–33)
ANION GAP SERPL CALCULATED.3IONS-SCNC: 11.3 MMOL/L (ref 5–15)
AST SERPL-CCNC: 22 U/L (ref 1–32)
B PARAPERT DNA SPEC QL NAA+PROBE: NOT DETECTED
B PERT DNA SPEC QL NAA+PROBE: NOT DETECTED
BASOPHILS # BLD AUTO: 0.06 10*3/MM3 (ref 0–0.2)
BASOPHILS NFR BLD AUTO: 0.6 % (ref 0–1.5)
BILIRUB SERPL-MCNC: 0.4 MG/DL (ref 0–1.2)
BUN SERPL-MCNC: 21 MG/DL (ref 8–23)
BUN/CREAT SERPL: 25 (ref 7–25)
C PNEUM DNA NPH QL NAA+NON-PROBE: NOT DETECTED
CALCIUM SPEC-SCNC: 9.9 MG/DL (ref 8.6–10.5)
CHLORIDE SERPL-SCNC: 100 MMOL/L (ref 98–107)
CO2 SERPL-SCNC: 27.7 MMOL/L (ref 22–29)
CREAT SERPL-MCNC: 0.84 MG/DL (ref 0.57–1)
D DIMER PPP FEU-MCNC: 0.6 MCGFEU/ML (ref 0–0.82)
D-LACTATE SERPL-SCNC: 1.1 MMOL/L (ref 0.5–2)
DEPRECATED RDW RBC AUTO: 46.8 FL (ref 37–54)
EGFRCR SERPLBLD CKD-EPI 2021: 69.5 ML/MIN/1.73
EOSINOPHIL # BLD AUTO: 0.66 10*3/MM3 (ref 0–0.4)
EOSINOPHIL NFR BLD AUTO: 6.8 % (ref 0.3–6.2)
ERYTHROCYTE [DISTWIDTH] IN BLOOD BY AUTOMATED COUNT: 13.4 % (ref 12.3–15.4)
FLUAV SUBTYP SPEC NAA+PROBE: NOT DETECTED
FLUBV RNA ISLT QL NAA+PROBE: NOT DETECTED
GLOBULIN UR ELPH-MCNC: 2.6 GM/DL
GLUCOSE SERPL-MCNC: 98 MG/DL (ref 65–99)
HADV DNA SPEC NAA+PROBE: NOT DETECTED
HCOV 229E RNA SPEC QL NAA+PROBE: NOT DETECTED
HCOV HKU1 RNA SPEC QL NAA+PROBE: NOT DETECTED
HCOV NL63 RNA SPEC QL NAA+PROBE: NOT DETECTED
HCOV OC43 RNA SPEC QL NAA+PROBE: NOT DETECTED
HCT VFR BLD AUTO: 39.1 % (ref 34–46.6)
HGB BLD-MCNC: 12.9 G/DL (ref 12–15.9)
HMPV RNA NPH QL NAA+NON-PROBE: NOT DETECTED
HPIV1 RNA ISLT QL NAA+PROBE: NOT DETECTED
HPIV2 RNA SPEC QL NAA+PROBE: NOT DETECTED
HPIV3 RNA NPH QL NAA+PROBE: NOT DETECTED
HPIV4 P GENE NPH QL NAA+PROBE: NOT DETECTED
IMM GRANULOCYTES # BLD AUTO: 0.05 10*3/MM3 (ref 0–0.05)
IMM GRANULOCYTES NFR BLD AUTO: 0.5 % (ref 0–0.5)
LYMPHOCYTES # BLD AUTO: 1.64 10*3/MM3 (ref 0.7–3.1)
LYMPHOCYTES NFR BLD AUTO: 16.9 % (ref 19.6–45.3)
M PNEUMO IGG SER IA-ACNC: NOT DETECTED
MCH RBC QN AUTO: 31.2 PG (ref 26.6–33)
MCHC RBC AUTO-ENTMCNC: 33 G/DL (ref 31.5–35.7)
MCV RBC AUTO: 94.7 FL (ref 79–97)
MONOCYTES # BLD AUTO: 1.11 10*3/MM3 (ref 0.1–0.9)
MONOCYTES NFR BLD AUTO: 11.4 % (ref 5–12)
NEUTROPHILS NFR BLD AUTO: 6.2 10*3/MM3 (ref 1.7–7)
NEUTROPHILS NFR BLD AUTO: 63.8 % (ref 42.7–76)
NRBC BLD AUTO-RTO: 0 /100 WBC (ref 0–0.2)
NT-PROBNP SERPL-MCNC: 52.9 PG/ML (ref 0–1800)
PLATELET # BLD AUTO: 195 10*3/MM3 (ref 140–450)
PMV BLD AUTO: 9.4 FL (ref 6–12)
POTASSIUM SERPL-SCNC: 4.8 MMOL/L (ref 3.5–5.2)
PROCALCITONIN SERPL-MCNC: 0.06 NG/ML (ref 0–0.25)
PROT SERPL-MCNC: 7 G/DL (ref 6–8.5)
RBC # BLD AUTO: 4.13 10*6/MM3 (ref 3.77–5.28)
RHINOVIRUS RNA SPEC NAA+PROBE: NOT DETECTED
RSV RNA NPH QL NAA+NON-PROBE: NOT DETECTED
SARS-COV-2 RNA NPH QL NAA+NON-PROBE: NOT DETECTED
SODIUM SERPL-SCNC: 139 MMOL/L (ref 136–145)
WBC NRBC COR # BLD: 9.72 10*3/MM3 (ref 3.4–10.8)

## 2023-09-01 PROCEDURE — 80053 COMPREHEN METABOLIC PANEL: CPT | Performed by: INTERNAL MEDICINE

## 2023-09-01 PROCEDURE — 84145 PROCALCITONIN (PCT): CPT | Performed by: INTERNAL MEDICINE

## 2023-09-01 PROCEDURE — 25010000002 ENOXAPARIN PER 10 MG: Performed by: INTERNAL MEDICINE

## 2023-09-01 PROCEDURE — 85379 FIBRIN DEGRADATION QUANT: CPT | Performed by: INTERNAL MEDICINE

## 2023-09-01 PROCEDURE — 83880 ASSAY OF NATRIURETIC PEPTIDE: CPT | Performed by: INTERNAL MEDICINE

## 2023-09-01 PROCEDURE — 85025 COMPLETE CBC W/AUTO DIFF WBC: CPT | Performed by: INTERNAL MEDICINE

## 2023-09-01 PROCEDURE — 25010000002 METHYLPREDNISOLONE PER 40 MG: Performed by: INTERNAL MEDICINE

## 2023-09-01 PROCEDURE — 83605 ASSAY OF LACTIC ACID: CPT | Performed by: INTERNAL MEDICINE

## 2023-09-01 PROCEDURE — 71046 X-RAY EXAM CHEST 2 VIEWS: CPT

## 2023-09-01 PROCEDURE — 0202U NFCT DS 22 TRGT SARS-COV-2: CPT | Performed by: INTERNAL MEDICINE

## 2023-09-01 RX ORDER — MELATONIN
5000 DAILY
Status: DISCONTINUED | OUTPATIENT
Start: 2023-09-02 | End: 2023-09-03 | Stop reason: HOSPADM

## 2023-09-01 RX ORDER — ACETAMINOPHEN 160 MG/5ML
650 SOLUTION ORAL EVERY 4 HOURS PRN
Status: DISCONTINUED | OUTPATIENT
Start: 2023-09-01 | End: 2023-09-03 | Stop reason: HOSPADM

## 2023-09-01 RX ORDER — IPRATROPIUM BROMIDE AND ALBUTEROL SULFATE 2.5; .5 MG/3ML; MG/3ML
3 SOLUTION RESPIRATORY (INHALATION) EVERY 4 HOURS PRN
Status: DISCONTINUED | OUTPATIENT
Start: 2023-09-01 | End: 2023-09-03 | Stop reason: HOSPADM

## 2023-09-01 RX ORDER — SODIUM CHLORIDE 9 MG/ML
40 INJECTION, SOLUTION INTRAVENOUS AS NEEDED
Status: DISCONTINUED | OUTPATIENT
Start: 2023-09-01 | End: 2023-09-03 | Stop reason: HOSPADM

## 2023-09-01 RX ORDER — DOCUSATE SODIUM 100 MG/1
100 CAPSULE, LIQUID FILLED ORAL NIGHTLY
Status: DISCONTINUED | OUTPATIENT
Start: 2023-09-01 | End: 2023-09-03 | Stop reason: HOSPADM

## 2023-09-01 RX ORDER — ZOLPIDEM TARTRATE 5 MG/1
5 TABLET ORAL NIGHTLY PRN
Status: DISCONTINUED | OUTPATIENT
Start: 2023-09-01 | End: 2023-09-03 | Stop reason: HOSPADM

## 2023-09-01 RX ORDER — NITROGLYCERIN 0.4 MG/1
0.4 TABLET SUBLINGUAL
Status: DISCONTINUED | OUTPATIENT
Start: 2023-09-01 | End: 2023-09-03 | Stop reason: HOSPADM

## 2023-09-01 RX ORDER — ACETAMINOPHEN 325 MG/1
650 TABLET ORAL EVERY 4 HOURS PRN
Status: DISCONTINUED | OUTPATIENT
Start: 2023-09-01 | End: 2023-09-03 | Stop reason: HOSPADM

## 2023-09-01 RX ORDER — ATORVASTATIN CALCIUM 20 MG/1
10 TABLET, FILM COATED ORAL EVERY OTHER DAY
Status: DISCONTINUED | OUTPATIENT
Start: 2023-09-01 | End: 2023-09-03 | Stop reason: HOSPADM

## 2023-09-01 RX ORDER — RAMIPRIL 5 MG/1
5 CAPSULE ORAL DAILY
Status: ON HOLD | COMMUNITY
End: 2023-09-01

## 2023-09-01 RX ORDER — ASCORBIC ACID 500 MG
500 TABLET ORAL DAILY
Status: DISCONTINUED | OUTPATIENT
Start: 2023-09-01 | End: 2023-09-03 | Stop reason: HOSPADM

## 2023-09-01 RX ORDER — SODIUM CHLORIDE 0.9 % (FLUSH) 0.9 %
10 SYRINGE (ML) INJECTION AS NEEDED
Status: DISCONTINUED | OUTPATIENT
Start: 2023-09-01 | End: 2023-09-03 | Stop reason: HOSPADM

## 2023-09-01 RX ORDER — ENOXAPARIN SODIUM 100 MG/ML
40 INJECTION SUBCUTANEOUS DAILY
Status: DISCONTINUED | OUTPATIENT
Start: 2023-09-01 | End: 2023-09-03 | Stop reason: HOSPADM

## 2023-09-01 RX ORDER — LEVOTHYROXINE SODIUM 0.1 MG/1
100 TABLET ORAL
Status: DISCONTINUED | OUTPATIENT
Start: 2023-09-02 | End: 2023-09-03 | Stop reason: HOSPADM

## 2023-09-01 RX ORDER — PANTOPRAZOLE SODIUM 40 MG/1
40 TABLET, DELAYED RELEASE ORAL
Status: DISCONTINUED | OUTPATIENT
Start: 2023-09-02 | End: 2023-09-03 | Stop reason: HOSPADM

## 2023-09-01 RX ORDER — CELECOXIB 200 MG/1
200 CAPSULE ORAL NIGHTLY
Status: DISCONTINUED | OUTPATIENT
Start: 2023-09-01 | End: 2023-09-03 | Stop reason: HOSPADM

## 2023-09-01 RX ORDER — HYDROCODONE POLISTIREX AND CHLORPHENIRAMINE POLISTIREX 10; 8 MG/5ML; MG/5ML
5 SUSPENSION, EXTENDED RELEASE ORAL EVERY 12 HOURS PRN
Status: DISCONTINUED | OUTPATIENT
Start: 2023-09-01 | End: 2023-09-03 | Stop reason: HOSPADM

## 2023-09-01 RX ORDER — ONDANSETRON 2 MG/ML
4 INJECTION INTRAMUSCULAR; INTRAVENOUS EVERY 6 HOURS PRN
Status: DISCONTINUED | OUTPATIENT
Start: 2023-09-01 | End: 2023-09-03 | Stop reason: HOSPADM

## 2023-09-01 RX ORDER — POLYETHYLENE GLYCOL 3350 17 G/17G
17 POWDER, FOR SOLUTION ORAL DAILY
Status: DISCONTINUED | OUTPATIENT
Start: 2023-09-01 | End: 2023-09-03 | Stop reason: HOSPADM

## 2023-09-01 RX ORDER — SODIUM CHLORIDE 0.9 % (FLUSH) 0.9 %
10 SYRINGE (ML) INJECTION EVERY 12 HOURS SCHEDULED
Status: DISCONTINUED | OUTPATIENT
Start: 2023-09-01 | End: 2023-09-03 | Stop reason: HOSPADM

## 2023-09-01 RX ORDER — METHYLPREDNISOLONE SODIUM SUCCINATE 40 MG/ML
40 INJECTION, POWDER, LYOPHILIZED, FOR SOLUTION INTRAMUSCULAR; INTRAVENOUS EVERY 8 HOURS
Status: DISCONTINUED | OUTPATIENT
Start: 2023-09-01 | End: 2023-09-02

## 2023-09-01 RX ADMIN — OXYCODONE HYDROCHLORIDE AND ACETAMINOPHEN 500 MG: 500 TABLET ORAL at 17:58

## 2023-09-01 RX ADMIN — POLYETHYLENE GLYCOL 3350 17 G: 17 POWDER, FOR SOLUTION ORAL at 17:58

## 2023-09-01 RX ADMIN — METHYLPREDNISOLONE SODIUM SUCCINATE 40 MG: 40 INJECTION INTRAMUSCULAR; INTRAVENOUS at 17:59

## 2023-09-01 RX ADMIN — DOCUSATE SODIUM 100 MG: 100 CAPSULE, LIQUID FILLED ORAL at 22:34

## 2023-09-01 RX ADMIN — CELECOXIB 200 MG: 200 CAPSULE ORAL at 22:34

## 2023-09-01 RX ADMIN — ENOXAPARIN SODIUM 40 MG: 100 INJECTION SUBCUTANEOUS at 17:59

## 2023-09-01 RX ADMIN — Medication 10 ML: at 22:35

## 2023-09-01 NOTE — H&P
Internal medicine history and physical  INTERNAL MEDICINE   [facility]       Patient Identification:  Name: Madisyn Plascencia  Age: 82 y.o.  Sex: female  :  1940  MRN: [id number]                   Primary Care Physician: Katarzyna Bland MD                               Date of admission:2023    Chief Complaint: Hypoxia and wheezing noted at the primary care physician's office earlier today.    History of Present Illness:   Patient is a 82-year-old female with Past medical history as noted below recently developed respiratory viral illness for which she was seen at [facility] and was treated with steroids with negative chest x-ray.  Patient has significant cough which improved after cough medication.  She had a follow-up with her primary care physician couple of times since her onset of respiratory symptoms and she was doing fine and improving.  She had a CT scan of the neck chest abdomen pelvis performed on 2023 as part of the work-up for lymphadenopathy under the direction of hematology oncology service and did not have any acute abnormalities in her chest.  Patient had a neck mass that was biopsied in 2023 which showed some lymphoproliferative pathology but inconclusive.  In this background patient has a follow-up today with primary care provider and was noted to be hypoxic with saturation in low 80s.  On examination patient was noted to be obviously wheezing.  Ironically patient herself feels fine denies any significant changes in her functional capacity or sense of wellbeing or alteration in her day-to-day activity with improvement in her cough after taking cough suspension.  Because of her hypoxia and wheezing patient was recommended direct admission to our facility for steroid and nebulizer treatment and further work-up.  Incidentally when I was evaluating the patient I noticed that her right foot was more swollen than the left with markers of her sandal strap  creating indentation on her foot.  When I inquired the patient about this she was very nonchalant about it and was saying that this is probably due to the tight sandal strap and has nothing to do with anything serious.  Patient did not complain of any calf pain.      Past Medical History:  Past Medical History:   Diagnosis Date    Anxiety     Arthritis     C. difficile colitis     Disease of thyroid gland     Diverticulitis     GERD (gastroesophageal reflux disease)     H/O uterine prolapse     Hyperlipidemia     Hypertension     Hypothyroidism     Knee swelling 2011    Had right knee replaced first … had to choose which knee first    Osteopenia     Prediabetes     Scoliosis     Shoulder injury     Uterine prolapse      Past Surgical History:  Past Surgical History:   Procedure Laterality Date    CHOLECYSTECTOMY      COLONOSCOPY      ENDOSCOPY      ENDOSCOPY N/A 05/25/2021    Procedure: ESOPHAGOGASTRODUODENOSCOPY WITH 52 FRENCH BRADFORD DILATION;  Surgeon: Tripp Pascal MD;  Location: [facility] ENDOSCOPY;  Service: Gastroenterology;  Laterality: N/A;  PRE- DYSPHAGIA  POST- ESOPHAGEAL RING    JOINT REPLACEMENT Right     KNEE    KNEE SURGERY      TONSILLECTOMY      TOTAL KNEE ARTHROPLASTY Left 11/22/2022    Procedure: LEFT TOTAL KNEE ARTHROPLASTY;  Surgeon: Antonoi Riggins MD;  Location: [facility] OR [facility] [place];  Service: Orthopedics;  Laterality: Left;      Home Meds:  Medications Prior to Admission   Medication Sig Dispense Refill Last Dose    atorvastatin (LIPITOR) 10 MG tablet Take 1 tablet by mouth Every Other Day.   8/31/2023    celecoxib (CeleBREX) 200 MG capsule Every Night.   8/31/2023    Cholecalciferol (VITAMIN D3) 5000 UNITS capsule capsule Take 1 capsule by mouth Daily.   9/1/2023    docusate sodium (COLACE) 50 MG capsule Take 2 capsules by mouth Every Night.   8/31/2023    Hydrocod Bebo-Chlorphe Bebo ER (TUSSIONEX PENNKINETIC) 10-8 MG/5ML ER suspension Take 5 mL by mouth Every 12 (Twelve) Hours As Needed for  Cough. 120 mL 0 9/1/2023    pantoprazole (PROTONIX) 40 MG EC tablet Take 1 tablet by mouth As Needed.   8/31/2023    polyethylene glycol (MIRALAX) 17 GM/SCOOP powder Take 17 g by mouth Daily.   8/31/2023    Synthroid 100 MCG tablet    9/1/2023    vitamin C (ASCORBIC ACID) 500 MG tablet Take 1 tablet by mouth Daily.   9/1/2023    zolpidem (AMBIEN) 5 MG tablet Take 1 tablet by mouth At Night As Needed for Sleep.   Past Week    methylPREDNISolone (MEDROL) 4 MG dose pack Take as directed on package instructions. 21 tablet 0     nystatin-triamcinolone (MYCOLOG) 664201-3.1 UNIT/GM-% ointment nystatin-triamcinolone 100,000 unit/gram-0.1 % topical ointment   More than a month     Current Meds:     Current Facility-Administered Medications:     acetaminophen (TYLENOL) tablet 650 mg, 650 mg, Oral, Q4H PRN **OR** acetaminophen (TYLENOL) 160 MG/5ML solution 650 mg, 650 mg, Oral, Q4H PRN **OR** acetaminophen (TYLENOL) suppository 650 mg, 650 mg, Rectal, Q4H PRN, Jamey Carter MD    ascorbic acid (VITAMIN C) tablet 500 mg, 500 mg, Oral, Daily, Jamey Carter MD    atorvastatin (LIPITOR) tablet 10 mg, 10 mg, Oral, Every Other Day, Jamey Carter MD    Calcium Replacement - Follow Nurse / BPA Driven Protocol, , Does not apply, PRN, Jamey Carter MD    celecoxib (CeleBREX) capsule 200 mg, 200 mg, Oral, Nightly, Jamey Carter MD    [START ON 9/2/2023] cholecalciferol (VITAMIN D3) tablet 5,000 Units, 5,000 Units, Oral, Daily, Jamey Carter MD    docusate sodium (COLACE) capsule 100 mg, 100 mg, Oral, Nightly, Jamey Carter MD    Enoxaparin Sodium (LOVENOX) syringe 40 mg, 40 mg, Subcutaneous, Daily, Jamey Carter MD    Hydrocod Bebo-Chlorphe Bebo ER (TUSSIONEX PENNKINETIC) 10-8 MG/5ML ER suspension 5 mL, 5 mL, Oral, Q12H PRN, Jamey Carter MD    ipratropium-albuterol (DUO-NEB) nebulizer solution 3 mL, 3 mL, Nebulization, Q4H PRN, Jamey Carter MD    [START ON 9/2/2023] levothyroxine (SYNTHROID, LEVOTHROID) tablet 100 mcg, 100 mcg, Oral, Q  AM, Jamey Carter MD    Magnesium Standard Dose Replacement - Follow Nurse / BPA Driven Protocol, , Does not apply, PRN, Jamey Carter MD    methylPREDNISolone sodium succinate (SOLU-Medrol) injection 40 mg, 40 mg, Intravenous, Q8H, Jamey Carter MD    nitroglycerin (NITROSTAT) SL tablet 0.4 mg, 0.4 mg, Sublingual, Q5 Min PRN, Jamey Carter MD    ondansetron (ZOFRAN) injection 4 mg, 4 mg, Intravenous, Q6H PRN, Jamey Carter MD    [START ON 2023] pantoprazole (PROTONIX) EC tablet 40 mg, 40 mg, Oral, Q AM, Jamey Carter MD    Phosphorus Replacement - Follow Nurse / BPA Driven Protocol, , Does not apply, PRN, Jamey Carter MD    polyethylene glycol (MIRALAX) packet 17 g, 17 g, Oral, Daily, Jamey Carter MD    Potassium Replacement - Follow Nurse / BPA Driven Protocol, , Does not apply, PRN, Jamey Carter MD    sodium chloride 0.9 % flush 10 mL, 10 mL, Intravenous, Q12H, Jamey Carter MD    sodium chloride 0.9 % flush 10 mL, 10 mL, Intravenous, PRN, Jamey Carter MD    sodium chloride 0.9 % infusion 40 mL, 40 mL, Intravenous, PRN, Jamey Carter MD    zolpidem (AMBIEN) tablet 5 mg, 5 mg, Oral, Nightly PRN, Jamey Carter MD  Allergies:  Allergies   Allergen Reactions    Sulfa Antibiotics Itching     Topical     Social History:   Social History     Tobacco Use    Smoking status: Never    Smokeless tobacco: Never   Substance Use Topics    Alcohol use: Not Currently      Family History:  Family History   Problem Relation Age of Onset    Cancer Mother          1964    Early death Mother     Heart attack Father     Heart disease Father     Migraines Daughter     Malig Hyperthermia Neg Hx           Review of Systems  See history of present illness and past medical history.    Constitutional: Remarkable for no fever or chills  Cardiovascular: Remarkable for no chest pain dyspnea on exertion or decrease in functional capacity orthopnea or PND  Respiratory: Remarkable for cough but denies any dyspnea on exertion and  "worsening functional capacity hemoptysis  GI: Remarkable for no nausea vomiting or diarrhea or decrease in appetite  : Remarkable for no burning urination frequency or urgency  Musculoskeletal: Remarkable for no new joint aches and pain.  Patient is not concerned about swelling of her right foot as she thinks because of the tight strap.  Neurological: Remarkable for no loss of consciousness or continence.     Vitals:   /68   Pulse 89   Temp 98.6 °F (37 °C) (Oral)   Resp 18   Ht 160 cm (63\")   Wt 96.6 kg (213 lb)   LMP  (LMP Unknown)   SpO2 100%   BMI 37.73 kg/m²   I/O:   Intake/Output Summary (Last 24 hours) at 9/1/2023 1631  Last data filed at 9/1/2023 1450  Gross per 24 hour   Intake 0 ml   Output --   Net 0 ml     Exam:  Patient is examined using the personal protective equipment as per guidelines from infection control for this particular patient as enacted.  Hand washing was performed before and after patient interaction.  General Appearance:    Alert, cooperative, no distress, appears stated age   Head:    Normocephalic, without obvious abnormality, atraumatic   Eyes:    PERRL, conjunctiva/corneas clear, EOM's intact, both eyes   Ears:    Normal external ear canals, both ears   Nose:   Nares normal, septum midline, mucosa normal, no drainage    or sinus tenderness   Throat:   Lips, tongue, gums normal; oral mucosa pink and moist   Neck: No significant adenopathy noted   Back:     Symmetric, no curvature, ROM normal, no CVA tenderness   Lungs:   No obvious use of accessory muscles of breathing noted scattered rhonchi anteriorly or posteriorly no crackles at the bases.  Patient does not appear to be in any acute distress and able to complete sentences without being out of breath.   Chest Wall:    No tenderness or deformity    Heart:  S1-S2 regular   Abdomen:   Obese soft nontender   Extremities: Edema of the right foot and leg compared to left foot noted.  No calf tenderness noted.   Pulses:   " Pulses palpable in all extremities; symmetric all extremities   Skin: No rash noted.   Neurologic: Alert and oriented and grossly nonfocal       Data Review:      I reviewed the patient's new clinical results.  Results from last 7 days   Lab Units 08/29/23  1648   WBC 10*3/mm3 8.02   HEMOGLOBIN g/dL 12.7   PLATELETS 10*3/mm3 197         CT Soft Tissue Neck With Contrast    Result Date: 8/25/2023  1. Since prior neck CT 05/12/2023 the mass in the posteroinferior tip of the superficial lobe of the right parotid gland is unchanged to equivocally 1-2 mm larger than it was on 05/12/2023. Currently it maximally measures 2.3 x 1.7 x 2.5 cm in mediolateral, anteroposterior and craniocaudal dimension; whereas on 05/12/2023 it measured 2.1 x 1.6 x 2.5 cm. This mass was biopsied on 06/13/2023 and the biopsy demonstrated some lymphoid proliferation and was somewhat inconclusive and correlate clinically as to whether the mass should be rebiopsied or resected. There is a stable 5 mm lymph node just inferior to the posteroinferior tip of the superficial lobe of the right parotid gland and a 12 x 11 mm lymph node in the suprahyoid right jugulodigastric chain that I favor are reactive rather than neoplastic. There is some mild asymmetric prominence of the right lingual tonsils when compared to the left, probably asymmetric hyperplastic change but correlation with direct visualization is suggested 2. Stable cervical spondylosis with a posterior disc osteophyte complex resulting in moderate central canal narrowing at C5-6. Posterior spurs with adjacent ossification of the posterior longitudinal ligament result in at least moderate up to moderate-to-severe canal narrowing at C6-7 and there is bilateral bony foraminal narrowing at C5-6 and C6-7. 3. The remainder of the neck CT is within normal limits.  Radiation dose reduction techniques were utilized, including automated exposure control and exposure modulation based on body size.    This report was finalized on 8/25/2023 7:18 AM by Dr. Antonio Mckoy M.D.      CT Chest With Contrast Diagnostic    Result Date: 8/25/2023  1. There is no lymphadenopathy. Normal splenic size. 2. Extensive pelvic floor relaxation with probable rectocele and some degree of vaginal prolapse.  This report was finalized on 8/25/2023 8:55 AM by Dr. Lis Chakraborty M.D.      CT Abdomen Pelvis With Contrast    Result Date: 8/25/2023  1. There is no lymphadenopathy. Normal splenic size. 2. Extensive pelvic floor relaxation with probable rectocele and some degree of vaginal prolapse.  This report was finalized on 8/25/2023 8:55 AM by Dr. Lis Chakraborty M.D.       Assessment:  Active Hospital Problems    Diagnosis  POA    **Hypoxia [R09.02]  Unknown    Wheezing [R06.2]  Unknown    Recent URI [J06.9]  Unknown    Hypothyroidism (acquired) [E03.9]  Unknown    Gastroesophageal reflux disease [K21.9]  Unknown    Essential hypertension [I10]  Unknown       Medical decision making/care plan: See admitting orders  Hypoxia with wheezing in the setting of recent respiratory viral illness and negative CT scan of the chest on 8/25/2023 and no subjective sense of ill health or decline in functional capacity-this may represent acute bronchitis or reactive airway disease.  Plan is to provide her with steroids and nebulizer treatment and provided with continuous pulse ox monitoring and as needed oxygen supplementation.  Since her last chest imaging was performed on 8/25/2023 and patient is not exhibiting signs of respiratory compromise in terms of objective assessment and showing hypoxia on pulse ox measurement repeat chest x-ray is indicated in this situation.  We will check basic labs including lactic acid level and procalcitonin level and check respiratory viral panel.  Given the description swelling of the right leg we will check D-dimer and if it is positive we will pursue further work-up including possible CT scan of the chest PE protocol as  well as venous Doppler.  In the meantime continue DVT prophylaxis with Lovenox.  Check BNP.  Hypothyroidism-continue thyroid replacement therapy.  Lymphadenopathy and neck nodule status post biopsy and current follow-up with hematology oncology service-monitor  Gastroesophageal reflux disease-continue her home regimen  Morbid obesity-provided with continuous pulse ox monitoring and monitor her mental status as patient is at risk of sleep apnea.    Jamey Carter MD   9/1/2023  16:31 EDT    Parts of this note may be an electronic transcription/translation of spoken language to printed text using the Dragon dictation system.

## 2023-09-01 NOTE — PLAN OF CARE
Problem: Adult Inpatient Plan of Care  Goal: Plan of Care Review  Outcome: Ongoing, Progressing  Flowsheets (Taken 9/1/2023 7405)  Progress: no change  Plan of Care Reviewed With: patient  Outcome Evaluation: pt admitted to [facility] from PCP with hypoxia. Chest XRay and RVP ordered. Started on IV Soulmedrol. Currently on room air. +expiratory wheeze. +Cough  Plan of care ongoing.

## 2023-09-02 LAB
ALBUMIN SERPL-MCNC: 3.9 G/DL (ref 3.5–5.2)
ALBUMIN/GLOB SERPL: 1.3 G/DL
ALP SERPL-CCNC: 85 U/L (ref 39–117)
ALT SERPL W P-5'-P-CCNC: 27 U/L (ref 1–33)
ANION GAP SERPL CALCULATED.3IONS-SCNC: 10.9 MMOL/L (ref 5–15)
ANION GAP SERPL CALCULATED.3IONS-SCNC: 11.9 MMOL/L (ref 5–15)
AST SERPL-CCNC: 27 U/L (ref 1–32)
BASOPHILS # BLD AUTO: 0.02 10*3/MM3 (ref 0–0.2)
BASOPHILS NFR BLD AUTO: 0.2 % (ref 0–1.5)
BILIRUB SERPL-MCNC: 0.3 MG/DL (ref 0–1.2)
BUN SERPL-MCNC: 21 MG/DL (ref 8–23)
BUN SERPL-MCNC: 21 MG/DL (ref 8–23)
BUN/CREAT SERPL: 24.4 (ref 7–25)
BUN/CREAT SERPL: 25.6 (ref 7–25)
CALCIUM SPEC-SCNC: 10 MG/DL (ref 8.6–10.5)
CALCIUM SPEC-SCNC: 9.8 MG/DL (ref 8.6–10.5)
CHLORIDE SERPL-SCNC: 100 MMOL/L (ref 98–107)
CHLORIDE SERPL-SCNC: 99 MMOL/L (ref 98–107)
CO2 SERPL-SCNC: 23.1 MMOL/L (ref 22–29)
CO2 SERPL-SCNC: 26.1 MMOL/L (ref 22–29)
CREAT SERPL-MCNC: 0.82 MG/DL (ref 0.57–1)
CREAT SERPL-MCNC: 0.86 MG/DL (ref 0.57–1)
D-LACTATE SERPL-SCNC: 2.9 MMOL/L (ref 0.5–2)
D-LACTATE SERPL-SCNC: 3.1 MMOL/L (ref 0.5–2)
D-LACTATE SERPL-SCNC: 3.4 MMOL/L (ref 0.5–2)
D-LACTATE SERPL-SCNC: 3.5 MMOL/L (ref 0.5–2)
D-LACTATE SERPL-SCNC: 3.8 MMOL/L (ref 0.5–2)
DEPRECATED RDW RBC AUTO: 44.8 FL (ref 37–54)
EGFRCR SERPLBLD CKD-EPI 2021: 67.5 ML/MIN/1.73
EGFRCR SERPLBLD CKD-EPI 2021: 71.5 ML/MIN/1.73
EOSINOPHIL # BLD AUTO: 0 10*3/MM3 (ref 0–0.4)
EOSINOPHIL NFR BLD AUTO: 0 % (ref 0.3–6.2)
ERYTHROCYTE [DISTWIDTH] IN BLOOD BY AUTOMATED COUNT: 13 % (ref 12.3–15.4)
GLOBULIN UR ELPH-MCNC: 2.9 GM/DL
GLUCOSE BLDC GLUCOMTR-MCNC: 179 MG/DL (ref 70–130)
GLUCOSE BLDC GLUCOMTR-MCNC: 219 MG/DL (ref 70–130)
GLUCOSE BLDC GLUCOMTR-MCNC: 258 MG/DL (ref 70–130)
GLUCOSE SERPL-MCNC: 211 MG/DL (ref 65–99)
GLUCOSE SERPL-MCNC: 240 MG/DL (ref 65–99)
HBA1C MFR BLD: 7 % (ref 4.8–5.6)
HCT VFR BLD AUTO: 38.4 % (ref 34–46.6)
HGB BLD-MCNC: 12.6 G/DL (ref 12–15.9)
IMM GRANULOCYTES # BLD AUTO: 0.05 10*3/MM3 (ref 0–0.05)
IMM GRANULOCYTES NFR BLD AUTO: 0.5 % (ref 0–0.5)
LYMPHOCYTES # BLD AUTO: 0.83 10*3/MM3 (ref 0.7–3.1)
LYMPHOCYTES NFR BLD AUTO: 8.3 % (ref 19.6–45.3)
MCH RBC QN AUTO: 31.1 PG (ref 26.6–33)
MCHC RBC AUTO-ENTMCNC: 32.8 G/DL (ref 31.5–35.7)
MCV RBC AUTO: 94.8 FL (ref 79–97)
MONOCYTES # BLD AUTO: 0.09 10*3/MM3 (ref 0.1–0.9)
MONOCYTES NFR BLD AUTO: 0.9 % (ref 5–12)
NEUTROPHILS NFR BLD AUTO: 8.97 10*3/MM3 (ref 1.7–7)
NEUTROPHILS NFR BLD AUTO: 90.1 % (ref 42.7–76)
NRBC BLD AUTO-RTO: 0 /100 WBC (ref 0–0.2)
PLATELET # BLD AUTO: 189 10*3/MM3 (ref 140–450)
PMV BLD AUTO: 9.7 FL (ref 6–12)
POTASSIUM SERPL-SCNC: 4.3 MMOL/L (ref 3.5–5.2)
POTASSIUM SERPL-SCNC: 5.6 MMOL/L (ref 3.5–5.2)
PROT SERPL-MCNC: 6.8 G/DL (ref 6–8.5)
RBC # BLD AUTO: 4.05 10*6/MM3 (ref 3.77–5.28)
SODIUM SERPL-SCNC: 134 MMOL/L (ref 136–145)
SODIUM SERPL-SCNC: 137 MMOL/L (ref 136–145)
WBC NRBC COR # BLD: 9.96 10*3/MM3 (ref 3.4–10.8)

## 2023-09-02 PROCEDURE — 25010000002 METHYLPREDNISOLONE PER 40 MG: Performed by: INTERNAL MEDICINE

## 2023-09-02 PROCEDURE — 82948 REAGENT STRIP/BLOOD GLUCOSE: CPT

## 2023-09-02 PROCEDURE — 83605 ASSAY OF LACTIC ACID: CPT | Performed by: INTERNAL MEDICINE

## 2023-09-02 PROCEDURE — 94760 N-INVAS EAR/PLS OXIMETRY 1: CPT

## 2023-09-02 PROCEDURE — 94664 DEMO&/EVAL PT USE INHALER: CPT

## 2023-09-02 PROCEDURE — 25010000002 ENOXAPARIN PER 10 MG: Performed by: INTERNAL MEDICINE

## 2023-09-02 PROCEDURE — 63710000001 INSULIN LISPRO (HUMAN) PER 5 UNITS: Performed by: HOSPITALIST

## 2023-09-02 PROCEDURE — 80053 COMPREHEN METABOLIC PANEL: CPT | Performed by: INTERNAL MEDICINE

## 2023-09-02 PROCEDURE — 94799 UNLISTED PULMONARY SVC/PX: CPT

## 2023-09-02 PROCEDURE — 94761 N-INVAS EAR/PLS OXIMETRY MLT: CPT

## 2023-09-02 PROCEDURE — 85025 COMPLETE CBC W/AUTO DIFF WBC: CPT | Performed by: INTERNAL MEDICINE

## 2023-09-02 PROCEDURE — 94640 AIRWAY INHALATION TREATMENT: CPT

## 2023-09-02 PROCEDURE — 83036 HEMOGLOBIN GLYCOSYLATED A1C: CPT | Performed by: HOSPITALIST

## 2023-09-02 RX ORDER — DEXTROSE MONOHYDRATE 25 G/50ML
25 INJECTION, SOLUTION INTRAVENOUS
Status: DISCONTINUED | OUTPATIENT
Start: 2023-09-02 | End: 2023-09-03 | Stop reason: HOSPADM

## 2023-09-02 RX ORDER — METHYLPREDNISOLONE SODIUM SUCCINATE 40 MG/ML
20 INJECTION, POWDER, LYOPHILIZED, FOR SOLUTION INTRAMUSCULAR; INTRAVENOUS EVERY 12 HOURS
Status: DISCONTINUED | OUTPATIENT
Start: 2023-09-02 | End: 2023-09-03

## 2023-09-02 RX ORDER — IBUPROFEN 600 MG/1
1 TABLET ORAL
Status: DISCONTINUED | OUTPATIENT
Start: 2023-09-02 | End: 2023-09-03 | Stop reason: HOSPADM

## 2023-09-02 RX ORDER — IPRATROPIUM BROMIDE AND ALBUTEROL SULFATE 2.5; .5 MG/3ML; MG/3ML
3 SOLUTION RESPIRATORY (INHALATION)
Status: DISCONTINUED | OUTPATIENT
Start: 2023-09-02 | End: 2023-09-03 | Stop reason: HOSPADM

## 2023-09-02 RX ORDER — INSULIN LISPRO 100 [IU]/ML
2-9 INJECTION, SOLUTION INTRAVENOUS; SUBCUTANEOUS
Status: DISCONTINUED | OUTPATIENT
Start: 2023-09-02 | End: 2023-09-03 | Stop reason: HOSPADM

## 2023-09-02 RX ORDER — NICOTINE POLACRILEX 4 MG
15 LOZENGE BUCCAL
Status: DISCONTINUED | OUTPATIENT
Start: 2023-09-02 | End: 2023-09-03 | Stop reason: HOSPADM

## 2023-09-02 RX ORDER — SODIUM CHLORIDE 9 MG/ML
125 INJECTION, SOLUTION INTRAVENOUS CONTINUOUS
Status: DISCONTINUED | OUTPATIENT
Start: 2023-09-02 | End: 2023-09-02

## 2023-09-02 RX ORDER — BUDESONIDE AND FORMOTEROL FUMARATE DIHYDRATE 160; 4.5 UG/1; UG/1
2 AEROSOL RESPIRATORY (INHALATION)
Status: DISCONTINUED | OUTPATIENT
Start: 2023-09-02 | End: 2023-09-03 | Stop reason: HOSPADM

## 2023-09-02 RX ADMIN — ENOXAPARIN SODIUM 40 MG: 100 INJECTION SUBCUTANEOUS at 09:29

## 2023-09-02 RX ADMIN — SODIUM CHLORIDE 500 ML: 9 INJECTION, SOLUTION INTRAVENOUS at 07:00

## 2023-09-02 RX ADMIN — METHYLPREDNISOLONE SODIUM SUCCINATE 40 MG: 40 INJECTION INTRAMUSCULAR; INTRAVENOUS at 02:36

## 2023-09-02 RX ADMIN — Medication 10 ML: at 22:33

## 2023-09-02 RX ADMIN — Medication 10 ML: at 09:29

## 2023-09-02 RX ADMIN — BUDESONIDE AND FORMOTEROL FUMARATE DIHYDRATE 2 PUFF: 160; 4.5 AEROSOL RESPIRATORY (INHALATION) at 20:23

## 2023-09-02 RX ADMIN — METHYLPREDNISOLONE SODIUM SUCCINATE 20 MG: 40 INJECTION, POWDER, FOR SOLUTION INTRAMUSCULAR; INTRAVENOUS at 22:32

## 2023-09-02 RX ADMIN — CELECOXIB 200 MG: 200 CAPSULE ORAL at 22:31

## 2023-09-02 RX ADMIN — METHYLPREDNISOLONE SODIUM SUCCINATE 40 MG: 40 INJECTION INTRAMUSCULAR; INTRAVENOUS at 09:22

## 2023-09-02 RX ADMIN — Medication 5000 UNITS: at 09:21

## 2023-09-02 RX ADMIN — IPRATROPIUM BROMIDE AND ALBUTEROL SULFATE 3 ML: 2.5; .5 SOLUTION RESPIRATORY (INHALATION) at 14:55

## 2023-09-02 RX ADMIN — SODIUM ZIRCONIUM CYCLOSILICATE 10 G: 10 POWDER, FOR SUSPENSION ORAL at 09:22

## 2023-09-02 RX ADMIN — INSULIN LISPRO 6 UNITS: 100 INJECTION, SOLUTION INTRAVENOUS; SUBCUTANEOUS at 17:18

## 2023-09-02 RX ADMIN — DOCUSATE SODIUM 100 MG: 100 CAPSULE, LIQUID FILLED ORAL at 22:31

## 2023-09-02 RX ADMIN — INSULIN LISPRO 2 UNITS: 100 INJECTION, SOLUTION INTRAVENOUS; SUBCUTANEOUS at 22:32

## 2023-09-02 RX ADMIN — POLYETHYLENE GLYCOL 3350 17 G: 17 POWDER, FOR SOLUTION ORAL at 09:29

## 2023-09-02 RX ADMIN — OXYCODONE HYDROCHLORIDE AND ACETAMINOPHEN 500 MG: 500 TABLET ORAL at 09:22

## 2023-09-02 RX ADMIN — ZOLPIDEM TARTRATE 5 MG: 5 TABLET ORAL at 22:42

## 2023-09-02 RX ADMIN — LEVOTHYROXINE SODIUM 100 MCG: 0.1 TABLET ORAL at 06:59

## 2023-09-02 RX ADMIN — PANTOPRAZOLE SODIUM 40 MG: 40 TABLET, DELAYED RELEASE ORAL at 06:59

## 2023-09-02 NOTE — PLAN OF CARE
Goal Outcome Evaluation:  Plan of Care Reviewed With: patient        Progress: improving  Outcome Evaluation: No c/o SOA, but de-sat when back from bathroom, will recover by self. Refused O2 until 0400, agreed to put on for couple of hours. VSS, up and ronn; CXR done last night. WCTM.

## 2023-09-02 NOTE — CONSULTS
CONSULT NOTE    Patient Identification:  Madisyn Plascencia  82 y.o.  female  1940  [id number]            Requesting physician: Dr Reagan Anderson    Reason for Consultation:  white, hypoxia in PCP office    CC: Shortness of breath wheeze    History of Present Illness:  Patient is an 82-year-old male with a previous medical history of parotid tumor and very small amount of interstitial abnormality on her CT scan of the chest evaluated by Dr. Gamez not felt to represent interstitial lung disease vs early ild  She presented to the emergency room after being followed up with her primary care provider and noted to have saturations in the 80s he reported to be wheezing.  Patient denies having a viral respiratory infection a few weeks ago however upon further questioning she says she had severe coughing 2 weeks ago and has actually had worsening shortness of breath associated with wheezing.  She did not try any inhaler however was seen in urgent care and was supposed to have received 1 and a steroid however she denies having gotten 1.  She was sent in for direct admission.  She is awake alert no lethargy no chest pain.  She has had some cough.  Some sputum production.  No hemoptysis.  She is very upset about her hemoglobin A1c and her insulin.  Saturation since arrival has been above 90% on room air.       Review of Systems:  Positive anxiety otherwise a12 system review of systems performed and all else negative    Past Medical History:   Diagnosis Date    Anxiety     Arthritis     C. difficile colitis     Disease of thyroid gland     Diverticulitis     GERD (gastroesophageal reflux disease)     H/O uterine prolapse     Hyperlipidemia     Hypertension     Hypothyroidism     Knee swelling 2011    Had right knee replaced first … had to choose which knee first    Osteopenia     Prediabetes     Scoliosis     Shoulder injury     Uterine prolapse        Past Surgical History:   Procedure Laterality Date     CHOLECYSTECTOMY      COLONOSCOPY      ENDOSCOPY      ENDOSCOPY N/A 05/25/2021    Procedure: ESOPHAGOGASTRODUODENOSCOPY WITH 52 FRENCH BRADFORD DILATION;  Surgeon: Tripp Pascal MD;  Location: [facility] ENDOSCOPY;  Service: Gastroenterology;  Laterality: N/A;  PRE- DYSPHAGIA  POST- ESOPHAGEAL RING    JOINT REPLACEMENT Right     KNEE    KNEE SURGERY      TONSILLECTOMY      TOTAL KNEE ARTHROPLASTY Left 11/22/2022    Procedure: LEFT TOTAL KNEE ARTHROPLASTY;  Surgeon: Antonio Riggins MD;  [place]  [facility] [place] [facility] [place]  Service: Orthopedics;  Laterality: Left;        Medications Prior to Admission   Medication Sig Dispense Refill Last Dose    atorvastatin (LIPITOR) 10 MG tablet Take 1 tablet by mouth Every Other Day.   8/31/2023    celecoxib (CeleBREX) 200 MG capsule Every Night.   8/31/2023    Cholecalciferol (VITAMIN D3) 5000 UNITS capsule capsule Take 1 capsule by mouth Daily.   9/1/2023    docusate sodium (COLACE) 50 MG capsule Take 2 capsules by mouth Every Night.   8/31/2023    Hydrocod Bebo-Chlorphe Bebo ER (TUSSIONEX PENNKINETIC) 10-8 MG/5ML ER suspension Take 5 mL by mouth Every 12 (Twelve) Hours As Needed for Cough. 120 mL 0 9/1/2023    pantoprazole (PROTONIX) 40 MG EC tablet Take 1 tablet by mouth As Needed.   8/31/2023    polyethylene glycol (MIRALAX) 17 GM/SCOOP powder Take 17 g by mouth Daily.   8/31/2023    Synthroid 100 MCG tablet    9/1/2023    vitamin C (ASCORBIC ACID) 500 MG tablet Take 1 tablet by mouth Daily.   9/1/2023    zolpidem (AMBIEN) 5 MG tablet Take 1 tablet by mouth At Night As Needed for Sleep.   Past Week    methylPREDNISolone (MEDROL) 4 MG dose pack Take as directed on package instructions. 21 tablet 0     nystatin-triamcinolone (MYCOLOG) 037810-8.1 UNIT/GM-% ointment nystatin-triamcinolone 100,000 unit/gram-0.1 % topical ointment   More than a month       Allergies   Allergen Reactions    Sulfa Antibiotics Itching     Topical       Social History     Socioeconomic History    Marital status:  "Single   Tobacco Use    Smoking status: Never    Smokeless tobacco: Never   Vaping Use    Vaping Use: Never used   Substance and Sexual Activity    Alcohol use: Not Currently    Drug use: Never    Sexual activity: Not Currently     Partners: Male     Birth control/protection: None     Comment: N/A       Family History   Problem Relation Age of Onset    Cancer Mother          1964    Early death Mother     Heart attack Father     Heart disease Father     Migraines Daughter     Malig Hyperthermia Neg Hx        Physical Exam:  /92   Pulse 103   Temp 97.7 °F (36.5 °C) (Oral)   Resp 18   Ht 160 cm (63\")   Wt 96.6 kg (213 lb)   LMP  (LMP Unknown)   SpO2 93%   BMI 37.73 kg/m²   Body mass index is 37.73 kg/m².   General appearance: Nontoxic, conversant   Eyes: Anicteric sclerae, moist conjunctivae; no lid lag   HENT: Atraumatic; oropharynx clear with moist mucous membranes    Neck: Trachea midline; large neck circumference  Lungs: Wheeze bilaterally, with normal respiratory effort and no intercostal retractions  CV: RRR, no MRGs   Abdomen: Obese bowel sounds positive  Extremities: No peripheral edema or extremity lymphadenopathy  Skin: Normal temperature, turgor and texture; no rash, ulcers or subcutaneous nodules  Psych: Anxious affect, alert  Neuro speech intact cranials 2 through gross intact moves extremities    LABS:  Results from last 7 days   Lab Units 23  0443 23  1648 23  1648   WBC 10*3/mm3 9.96 9.72 8.02   HEMOGLOBIN g/dL 12.6 12.9 12.7   PLATELETS 10*3/mm3 189 195 197     Results from last 7 days   Lab Units 23  0443 23  1648   SODIUM mmol/L 134* 139   POTASSIUM mmol/L 5.6* 4.8   CHLORIDE mmol/L 99 100   CO2 mmol/L 23.1 27.7   BUN mg/dL 21 21   CREATININE mg/dL 0.86 0.84   GLUCOSE mg/dL 240* 98   CALCIUM mg/dL 9.8 9.9   Estimated Creatinine Clearance: 55.8 mL/min (by C-G formula based on SCr of 0.86 mg/dL).    Imaging: I personally visualized the images of " scans/x-rays performed within last 3 days.  Imaging Results (Most Recent)       Procedure Component Value Units Date/Time    XR Chest PA & Lateral [[id number]] Resulted: 09/01/23 1944     Updated: 09/01/23 1944            Assessment / Recommendations:  Abnormal ct chest  Postinfectious hyperreactive airway syndrome.  Dyspnea  Obesity  Diabetes  Mild lactic acidosis  GERD  Anxiety  Hyperlipidemia  Hypertension  Hypothyroidism  Parotid mass  Uterine prolapse  History of C. difficile colitis      She has symptoms suggestive of a viral bronchitis two weeks ago and now wheezing soa.  Suspect post infectious hyperreactive airway syndrome  Syndrome  Symbicort  DuoNebs  Decrease dose of solumedrol to 20 bid      Procalcitonin low arguing against active infection  proBNP low arguing against active fluid overload  D-dimer negative for her age arguing against pulmonary emboli.  White count 9.7 arguing against infection  Respiratory viral panel reassuring    CT chest very minimal focal pulmonary fibrosis likely secondary to osteophyte agree with Dr. Gamez  Stable compared to prior and not causative factor in her shortness of breath.    CT chest 8/25/2023:  CHEST: There is no lymphadenopathy within the chest or at the axilla or   subpectoral regions. Stable changes of mild interstitial fibrosis   predominantly at the right lower lobe. There are no new opacities and   there are no pleural or pericardial effusions.     Needs continued outpt pfts      Reviewed current and prior ct chest, cxr, outpatient office note sand pfts.      Colin Escobar MD  [facility]  09/02/23  13:03 EDT

## 2023-09-02 NOTE — PROGRESS NOTES
Dedicated to Hospital Care    [phone]   LOS: 1 day     Name: Madisyn Plascencia  Age/Sex: 82 y.o. female  :  1940        PCP: Katarzyna Bland MD  No chief complaint on file.     Subjective   She denies shortness of breath chest pain palpitations still wheezing.  Still requiring supplemental oxygen but titrated down to 1 L nasal cannula.  Likely needs additional oxygen when up and ambulating and moving around.  General: No Fever or Chills, Cardiac: No Chest Pain or Palpitations, GI: No Nausea, Vomiting, or Diarrhea, and Other: No bleeding    vitamin C, 500 mg, Oral, Daily  atorvastatin, 10 mg, Oral, Every Other Day  celecoxib, 200 mg, Oral, Nightly  cholecalciferol, 5,000 Units, Oral, Daily  docusate sodium, 100 mg, Oral, Nightly  enoxaparin, 40 mg, Subcutaneous, Daily  levothyroxine, 100 mcg, Oral, Q AM  methylPREDNISolone sodium succinate, 40 mg, Intravenous, Q8H  pantoprazole, 40 mg, Oral, Q AM  polyethylene glycol, 17 g, Oral, Daily  sodium chloride, 10 mL, Intravenous, Q12H           Objective   Vital Signs  Temp:  [97.7 °F (36.5 °C)-98.6 °F (37 °C)] 97.7 °F (36.5 °C)  Heart Rate:  [] 103  Resp:  [18] 18  BP: (110-162)/() 159/92  Body mass index is 37.73 kg/m².    Intake/Output Summary (Last 24 hours) at 2023 1000  Last data filed at 2023 0820  Gross per 24 hour   Intake 360 ml   Output --   Net 360 ml       Physical Exam  Vitals and nursing note reviewed.   Constitutional:       Appearance: Normal appearance. She is obese.   Cardiovascular:      Rate and Rhythm: Normal rate.   Pulmonary:      Effort: Pulmonary effort is normal. No respiratory distress.      Breath sounds: Wheezing present.   Abdominal:      General: Bowel sounds are normal. There is no distension.      Palpations: Abdomen is soft.   Neurological:      General: No focal deficit present.      Mental Status: She is alert. Mental status is at baseline.         Results Review:       I reviewed the patient's new  clinical results.  Results from last 7 days   Lab Units 09/02/23  0443 09/01/23  1648 08/29/23  1648   WBC 10*3/mm3 9.96 9.72 8.02   HEMOGLOBIN g/dL 12.6 12.9 12.7   PLATELETS 10*3/mm3 189 195 197     Results from last 7 days   Lab Units 09/02/23  0443 09/01/23  1648   SODIUM mmol/L 134* 139   POTASSIUM mmol/L 5.6* 4.8   CHLORIDE mmol/L 99 100   CO2 mmol/L 23.1 27.7   BUN mg/dL 21 21   CREATININE mg/dL 0.86 0.84   CALCIUM mg/dL 9.8 9.9   Estimated Creatinine Clearance: 55.8 mL/min (by C-G formula based on SCr of 0.86 mg/dL).      Assessment & Plan   Active Hospital Problems    Diagnosis  POA    **Hypoxia [R09.02]  Unknown    Wheezing [R06.2]  Unknown    Recent URI [J06.9]  Unknown    Hypothyroidism (acquired) [E03.9]  Unknown    Gastroesophageal reflux disease [K21.9]  Unknown    Essential hypertension [I10]  Unknown      Resolved Hospital Problems   No resolved problems to display.       PLAN  This is an 82-year-old female with a history of hypothyroidism hypertension and obesity who presents to the hospital with shortness of breath hypoxia and wheezing from her physician's office with concern for ongoing hypoxia and wheezing  -Chest x-ray and D-dimer are negative.  She is already had CT scans a week ago.  I did consider repeating her CT scan however the patient is already refused additional imaging at this point since her primary care doctor did it a week and a half ago.  -She is followed in the outpatient setting by Elia Gamez for some underlying pulmonary fibrosis.  I am going to have him evaluate the patient and get his opinion on things.  -In the meantime we will continue breathing treatments and steroids.  -Noted elevated potassium today will recheck BMP later today as I think this is probably a lab error.  Her glucose is also noted to be 248 we will check an A1c and consider initiation of sliding scale if this comes back elevated.  -Continue pharmacologic DVT prophylaxis  -Full code    Disposition  Expected  discharge date/ time has not been documented.       Reagan Anderson MD  [facility]  09/02/23  10:00 EDT

## 2023-09-02 NOTE — PLAN OF CARE
Problem: Adult Inpatient Plan of Care  Goal: Plan of Care Review  Outcome: Ongoing, Progressing  Flowsheets (Taken 9/2/2023 9369)  Progress: improving  Plan of Care Reviewed With: patient  Outcome Evaluation: Pt vital stable. No c/o pain. Pulmonology consulted. Symbicort added. Pt ambulating in room. Sliding scale added for bg. Pt safety maintained

## 2023-09-03 VITALS
HEART RATE: 79 BPM | OXYGEN SATURATION: 91 % | BODY MASS INDEX: 37.74 KG/M2 | RESPIRATION RATE: 18 BRPM | WEIGHT: 213 LBS | TEMPERATURE: 97.5 F | DIASTOLIC BLOOD PRESSURE: 69 MMHG | HEIGHT: 63 IN | SYSTOLIC BLOOD PRESSURE: 134 MMHG

## 2023-09-03 PROBLEM — E11.65 TYPE 2 DIABETES MELLITUS WITH HYPERGLYCEMIA: Status: ACTIVE | Noted: 2023-09-03

## 2023-09-03 LAB
D-LACTATE SERPL-SCNC: 3.3 MMOL/L (ref 0.5–2)
GLUCOSE BLDC GLUCOMTR-MCNC: 155 MG/DL (ref 70–130)
GLUCOSE BLDC GLUCOMTR-MCNC: 168 MG/DL (ref 70–130)

## 2023-09-03 PROCEDURE — 82948 REAGENT STRIP/BLOOD GLUCOSE: CPT

## 2023-09-03 PROCEDURE — 94664 DEMO&/EVAL PT USE INHALER: CPT

## 2023-09-03 PROCEDURE — 94799 UNLISTED PULMONARY SVC/PX: CPT

## 2023-09-03 PROCEDURE — 94761 N-INVAS EAR/PLS OXIMETRY MLT: CPT

## 2023-09-03 PROCEDURE — 25010000002 ENOXAPARIN PER 10 MG: Performed by: INTERNAL MEDICINE

## 2023-09-03 PROCEDURE — 83605 ASSAY OF LACTIC ACID: CPT | Performed by: INTERNAL MEDICINE

## 2023-09-03 PROCEDURE — 63710000001 PREDNISONE PER 1 MG: Performed by: INTERNAL MEDICINE

## 2023-09-03 PROCEDURE — 63710000001 INSULIN LISPRO (HUMAN) PER 5 UNITS: Performed by: HOSPITALIST

## 2023-09-03 RX ORDER — PREDNISONE 10 MG/1
TABLET ORAL
Qty: 7 TABLET | Refills: 0 | Status: SHIPPED | OUTPATIENT
Start: 2023-09-04 | End: 2023-09-10

## 2023-09-03 RX ORDER — PANTOPRAZOLE SODIUM 40 MG/1
40 TABLET, DELAYED RELEASE ORAL 2 TIMES DAILY
Qty: 60 TABLET | Refills: 0 | Status: SHIPPED | OUTPATIENT
Start: 2023-09-03

## 2023-09-03 RX ORDER — PREDNISONE 20 MG/1
20 TABLET ORAL
Status: DISCONTINUED | OUTPATIENT
Start: 2023-09-03 | End: 2023-09-03 | Stop reason: HOSPADM

## 2023-09-03 RX ORDER — BUDESONIDE AND FORMOTEROL FUMARATE DIHYDRATE 160; 4.5 UG/1; UG/1
2 AEROSOL RESPIRATORY (INHALATION)
Qty: 1 EACH | Refills: 12 | Status: SHIPPED | OUTPATIENT
Start: 2023-09-03

## 2023-09-03 RX ADMIN — INSULIN LISPRO 2 UNITS: 100 INJECTION, SOLUTION INTRAVENOUS; SUBCUTANEOUS at 12:03

## 2023-09-03 RX ADMIN — Medication 10 ML: at 08:03

## 2023-09-03 RX ADMIN — PANTOPRAZOLE SODIUM 40 MG: 40 TABLET, DELAYED RELEASE ORAL at 06:51

## 2023-09-03 RX ADMIN — POLYETHYLENE GLYCOL 3350 17 G: 17 POWDER, FOR SOLUTION ORAL at 10:10

## 2023-09-03 RX ADMIN — INSULIN LISPRO 2 UNITS: 100 INJECTION, SOLUTION INTRAVENOUS; SUBCUTANEOUS at 07:59

## 2023-09-03 RX ADMIN — ATORVASTATIN CALCIUM 10 MG: 20 TABLET, FILM COATED ORAL at 08:00

## 2023-09-03 RX ADMIN — PREDNISONE 20 MG: 20 TABLET ORAL at 10:10

## 2023-09-03 RX ADMIN — ENOXAPARIN SODIUM 40 MG: 100 INJECTION SUBCUTANEOUS at 08:00

## 2023-09-03 RX ADMIN — OXYCODONE HYDROCHLORIDE AND ACETAMINOPHEN 500 MG: 500 TABLET ORAL at 08:00

## 2023-09-03 RX ADMIN — BUDESONIDE AND FORMOTEROL FUMARATE DIHYDRATE 2 PUFF: 160; 4.5 AEROSOL RESPIRATORY (INHALATION) at 07:01

## 2023-09-03 RX ADMIN — LEVOTHYROXINE SODIUM 100 MCG: 0.1 TABLET ORAL at 06:51

## 2023-09-03 RX ADMIN — Medication 5000 UNITS: at 08:00

## 2023-09-03 NOTE — DISCHARGE SUMMARY
Patient Name: Madisyn Plascencia  : 1940  MRN: [id number]    Date of Admission: 2023  Date of Discharge:  9/3/2023  Primary Care Physician: Katarzyna Bland MD      Chief Complaint:   Shortness of breath and wheezing    Discharge Diagnoses     Active Hospital Problems    Diagnosis  POA    **Hypoxia [R09.02]  Unknown    Type 2 diabetes mellitus with hyperglycemia [E11.65]  Unknown    Wheezing [R06.2]  Unknown    Recent URI [J06.9]  Unknown    Hypothyroidism (acquired) [E03.9]  Unknown    Gastroesophageal reflux disease [K21.9]  Unknown    Essential hypertension [I10]  Unknown      Resolved Hospital Problems   No resolved problems to display.        Hospital Course     Ms. Plascencia is a 82 y.o. female with a history of recent upper respiratory infection hypothyroidism reflux obesity and hypertension who presented to [facility] initially complaining of shortness of breath and wheezing.  Please see the admitting history and physical for further details.  She was found to have shortness of breath and wheezing and was admitted to the hospital for further evaluation and treatment.  She was admitted to the hospital with shortness of breath wheezing and hypoxia.  She had no increased work of breathing on presentation.  She was quickly titrated off of oxygen and has been up ambulating independently in the hospital with no need for supplemental oxygen at this time.  She was seen and evaluated by pulmonary given her prior history of possible pulmonary fibrosis.  They feel her exam and imaging are more consistent with possible either reactive airway disease or other irritation of her airways and are no longer related to either pulmonary fibrosis or underlying infectious process.  They agreed with transition to oral steroids and continuing bronchodilators at home.  Is also possible to this could be related to underlying reflux.  She had been on some steroids which could have exacerbated things as well.  She  takes a proton pump inhibitor but only as needed at home.  Of asked her to take it twice a day for the next 14 days and also elevate her head at night to see if this helps.  She is on Ambien which does increase her risk for silent aspiration at night as well.  If no improvement with twice daily PPI over the next 2 weeks can probably just go back to once a day.  I would not recommend doing this as needed which is continue to take daily.  We will taper the steroids quickly over the next few days.  Discussed the plan extensively with both her and her daughter.  She was diagnosed with type 2 diabetes during the hospitalization given the A1c of 7 present on admission.  The hyperglycemia noted during the admission is expected with the steroids use during the hospitalization.  I recommended that she be on metformin at discharge will recommend once daily medication for the next 14 days and then transition to 500 mg twice daily.  I think this in combination with diet and exercise and weight loss she could come off the medication in the future.  We did discuss other medications including Ozempic and Jardiance but given the side effect panels and her age I think it be more beneficial to start with metformin.  At any rate we did discuss all this on the day of discharge all questions have been addressed and answered and she stable to discharge home today.        Day of Discharge     Subjective:  She is feeling much better today.  She has been up ambulating the room.  She has been up ambulating the unit.  She has made her bed she is up and dressed she is not requiring any supplemental oxygen.    Physical Exam:  Temp:  [97.5 °F (36.4 °C)-97.7 °F (36.5 °C)] 97.5 °F (36.4 °C)  Heart Rate:  [] 79  Resp:  [16-18] 18  BP: (134-160)/(69-99) 134/69  Body mass index is 37.73 kg/m².  Physical Exam  Vitals and nursing note reviewed.   Constitutional:       Appearance: Normal appearance.   Cardiovascular:      Rate and Rhythm: Normal  rate.   Pulmonary:      Effort: No respiratory distress.      Breath sounds: Normal breath sounds. No wheezing or rales.      Comments: No increased work of breathing no wheezing no rales today  Abdominal:      General: Bowel sounds are normal.      Palpations: Abdomen is soft.   Neurological:      General: No focal deficit present.      Mental Status: She is alert. Mental status is at baseline.       Consultants     Consult Orders (all) (From admission, onward)       Start     Ordered    09/03/23 0801  Inpatient Diabetes Educator Consult  Once        Provider:  (Not yet assigned)    09/03/23 0800    09/02/23 0753  Inpatient Pulmonology Consult  Once        Specialty:  Pulmonary Disease  Provider:  Anamika Mendoza MD    09/02/23 0752                  Procedures     * Surgery not found *    Imaging Results (All)       Procedure Component Value Units Date/Time    XR Chest PA & Lateral [[id number]] Resulted: 09/01/23 1944     Updated: 09/01/23 1944          Results for orders placed during the hospital encounter of 03/14/23    Duplex venous lower extremity bilateral CAR    Interpretation Summary    Left popliteal fossa fluid collection.    Normal bilateral lower extremity venous duplex scan.    Results for orders placed during the hospital encounter of 05/02/23    Adult Transthoracic Echo Complete W/ Cont if Necessary Per Protocol    Interpretation Summary    Left ventricular systolic function is normal. Calculated left ventricular EF = 61.6%    Left ventricular wall thickness is consistent with hypertrophy. Sigmoid-shaped ventricular septum is present.    Left ventricular diastolic function was normal.    There is mild calcification of the aortic valve.    Pertinent Labs     Results from last 7 days   Lab Units 09/02/23  0443 09/01/23  1648 08/29/23  1648   WBC 10*3/mm3 9.96 9.72 8.02   HEMOGLOBIN g/dL 12.6 12.9 12.7   PLATELETS 10*3/mm3 189 195 197     Results from last 7 days   Lab Units 09/02/23  1426 09/02/23  0443  09/01/23  1648   SODIUM mmol/L 137 134* 139   POTASSIUM mmol/L 4.3 5.6* 4.8   CHLORIDE mmol/L 100 99 100   CO2 mmol/L 26.1 23.1 27.7   BUN mg/dL 21 21 21   CREATININE mg/dL 0.82 0.86 0.84   GLUCOSE mg/dL 211* 240* 98   EGFR mL/min/1.73 71.5 67.5 69.5     Results from last 7 days   Lab Units 09/02/23  0443 09/01/23  1648   ALBUMIN g/dL 3.9 4.4   BILIRUBIN mg/dL 0.3 0.4   ALK PHOS U/L 85 96   AST (SGOT) U/L 27 22   ALT (SGPT) U/L 27 27     Results from last 7 days   Lab Units 09/02/23  1426 09/02/23  0443 09/01/23  1648   CALCIUM mg/dL 10.0 9.8 9.9   ALBUMIN g/dL  --  3.9 4.4       Results from last 7 days   Lab Units 09/01/23  1648   PROBNP pg/mL 52.9   D DIMER QUANT MCGFEU/mL 0.60           Invalid input(s): LDLCALC      Results from last 7 days   Lab Units 09/01/23  1637   COVID19  Not Detected       Test Results Pending at Discharge       Discharge Details        Discharge Medications        New Medications        Instructions Start Date   metFORMIN 500 MG tablet  Commonly known as: Glucophage   500 mg, Oral, 2 Times Daily With Meals, Take the metformin once daily for the first 2 weeks then begin taking twice daily      predniSONE 10 MG tablet  Commonly known as: DELTASONE   Take 2 tablets by mouth Daily With Breakfast for 2 days, THEN 1 tablet Daily With Breakfast for 2 days, THEN 0.5 tablets Daily With Breakfast for 2 days.   Start Date: September 4, 2023            Changes to Medications        Instructions Start Date   pantoprazole 40 MG EC tablet  Commonly known as: PROTONIX  What changed:   when to take this  reasons to take this   40 mg, Oral, 2 Times Daily             Continue These Medications        Instructions Start Date   atorvastatin 10 MG tablet  Commonly known as: LIPITOR   10 mg, Oral, Every Other Day      celecoxib 200 MG capsule  Commonly known as: CeleBREX   Nightly      docusate sodium 50 MG capsule  Commonly known as: COLACE   100 mg, Oral, Nightly      Hydrocod Bebo-Chlorphe Bebo ER 10-8  MG/5ML ER suspension  Commonly known as: TUSSIONEX PENNKINETIC   5 mL, Oral, Every 12 Hours PRN      nystatin-triamcinolone 225559-9.1 UNIT/GM-% ointment  Commonly known as: MYCOLOG   nystatin-triamcinolone 100,000 unit/gram-0.1 % topical ointment      polyethylene glycol 17 GM/SCOOP powder  Commonly known as: MIRALAX   17 g, Oral, Daily      Synthroid 100 MCG tablet  Generic drug: levothyroxine   No dose, route, or frequency recorded.      vitamin C 500 MG tablet  Commonly known as: ASCORBIC ACID   500 mg, Oral, Daily      vitamin D3 125 MCG (5000 UT) capsule capsule   5,000 Units, Oral, Daily      zolpidem 5 MG tablet  Commonly known as: AMBIEN   5 mg, Oral, Nightly PRN             Stop These Medications      methylPREDNISolone 4 MG dose pack  Commonly known as: MEDROL              Allergies   Allergen Reactions    Sulfa Antibiotics Itching     Topical       Discharge Disposition:  Home or Self Care      Discharge Diet:  Diet Order   Procedures    Diet: Cardiac Diets, Renal Diets, Diabetic Diets; Healthy Heart (2-3 Na+); Consistent Carbohydrate; Low Potassium; Texture: Regular Texture (IDDSI 7); Fluid Consistency: Thin (IDDSI 0)       Discharge Activity:   Activity Instructions       Activity as Tolerated              CODE STATUS:    Code Status and Medical Interventions:   Ordered at: 09/01/23 1628     Code Status (Patient has no pulse and is not breathing):    CPR (Attempt to Resuscitate)     Medical Interventions (Patient has pulse or is breathing):    Full Support       Future Appointments   Date Time Provider Department Center   9/13/2023  2:30 PM Venkata Hamilton MD [facility]   9/26/2023  2:20 PM LAB CHAIR [facility]   9/26/2023  2:40 PM Og Wolf MD [facility]     Additional Instructions for the Follow-ups that You Need to Schedule       Discharge Follow-up with PCP   As directed       Currently Documented PCP:    Katarzyna Bland MD    PCP Phone  Number:    [phone]     Follow Up Details: 1-2 weeks               Follow-up Information       Ramesh Gamez Jr., MD Follow up.    Specialty: Pulmonary Disease  Contact information:  [address] [facility] [address]  [place] [id number] [phone]               Katarzyna Bland MD .    Specialty: Internal Medicine  Why: 1-2 weeks  Contact information:  [address] [facility] [address]  [place] [id number] [phone]                             Additional Instructions for the Follow-ups that You Need to Schedule       Discharge Follow-up with PCP   As directed       Currently Documented PCP:    Katarzyna Bland MD    PCP Phone Number:    [phone]     Follow Up Details: 1-2 weeks            Time Spent on Discharge:  Greater than 30 minutes      Reagan Anderson MD  [facility]  09/03/23  11:43 EDT

## 2023-09-03 NOTE — PROGRESS NOTES
"  Daily Progress Note.   [address]  9/3/2023    Patient:  Name:  Madisyn Plascencia  MRN:  [id number]  1940  82 y.o.  female         Requesting physician: Dr Reagan Anderson     Reason for Consultation:  white, hypoxia in PCP office     CC: Shortness of breath wheeze     History of Present Illness:  Patient is an 82-year-old male with a previous medical history of parotid tumor and very small amount of interstitial abnormality on her CT scan of the chest evaluated by Dr. Gamez not felt to represent interstitial lung disease vs early ild  She presented to the emergency room after being followed up with her primary care provider and noted to have saturations in the 80s he reported to be wheezing.  Patient denies having a viral respiratory infection a few weeks ago however upon further questioning she says she had severe coughing 2 weeks ago and has actually had worsening shortness of breath associated with wheezing.  She did not try any inhaler however was seen in urgent care and was supposed to have received 1 and a steroid however she denies having gotten 1.  She was sent in for direct admission.  She is awake alert no lethargy no chest pain.  She has had some cough.  Some sputum production.  No hemoptysis.  She is very upset about her hemoglobin A1c and her insulin.  Saturation since arrival has been above 90% on room air.     Interval History:  Afebrile blood pressure stable on room air.  Feels better less wheezing still with tickle in throat  Shakes with albuterol.  Awake alert anxious but appropriate and no lethargy  No cp no palpitations.    Physical Exam:  /69 (BP Location: Right arm, Patient Position: Lying)   Pulse 79   Temp 97.5 °F (36.4 °C) (Oral)   Resp 18   Ht 160 cm (63\")   Wt 96.6 kg (213 lb)   LMP  (LMP Unknown)   SpO2 91%   BMI 37.73 kg/m²   Body mass index is 37.73 kg/m².    Intake/Output Summary (Last 24 hours) at 9/3/2023 0830  Last data filed at 9/2/2023 " 1716  Gross per 24 hour   Intake 0 ml   Output --   Net 0 ml   General appearance: Nontoxic, conversant   Eyes: Anicteric sclerae, moist conjunctivae; no lid lag   HENT: Atraumatic; oropharynx clear with moist mucous membranes    Neck: Trachea midline; large neck circumference  Lungs: no Wheeze bilaterally, minimal rhonchi with normal respiratory effort and no intercostal retractions  CV: RRR, no MRGs   Abdomen: Obese bowel sounds positive  Extremities: No peripheral edema    Skin: wwp no diffuse rash  Psych: Anxious affect, alert  Neuro speech intact cranials 2 through gross intact moves extremities    Data Review:  Notable Labs:  Results from last 7 days   Lab Units 09/02/23 0443 09/01/23  1648 08/29/23  1648   WBC 10*3/mm3 9.96 9.72 8.02   HEMOGLOBIN g/dL 12.6 12.9 12.7   PLATELETS 10*3/mm3 189 195 197     Results from last 7 days   Lab Units 09/02/23 1426 09/02/23 0443 09/01/23  1648   SODIUM mmol/L 137 134* 139   POTASSIUM mmol/L 4.3 5.6* 4.8   CHLORIDE mmol/L 100 99 100   CO2 mmol/L 26.1 23.1 27.7   BUN mg/dL 21 21 21   CREATININE mg/dL 0.82 0.86 0.84   GLUCOSE mg/dL 211* 240* 98   CALCIUM mg/dL 10.0 9.8 9.9   Estimated Creatinine Clearance: 58.5 mL/min (by C-G formula based on SCr of 0.82 mg/dL).    Results from last 7 days   Lab Units 09/03/23  0203 09/02/23 2006 09/02/23  1426 09/02/23  0749 09/02/23 0443 09/01/23  1648 09/01/23  1648 08/29/23  1648   AST (SGOT) U/L  --   --   --   --  27  --  22  --    ALT (SGPT) U/L  --   --   --   --  27  --  27  --    PROCALCITONIN ng/mL  --   --   --   --   --   --  0.06  --    LACTATE mmol/L 3.3* 3.8* 3.5*   < > 3.4*   < > 1.1  --    D DIMER QUANT MCGFEU/mL  --   --   --   --   --   --  0.60  --    CRP mg/dL  --   --   --   --   --   --   --  0.40   PLATELETS 10*3/mm3  --   --   --   --  189  --  195 197    < > = values in this interval not displayed.             Imaging:  Reviewed chest images personally from past 3 days  CT chest 8/25/2023:  CHEST: There is no  lymphadenopathy within the chest or at the axilla or   subpectoral regions. Stable changes of mild interstitial fibrosis   predominantly at the right lower lobe. There are no new opacities and   there are no pleural or pericardial effusions.     ASSESSMENT  /  PLAN:   Abnormal ct chest  Postinfectious hyperreactive airway syndrome.  Dyspnea  Obesity  Diabetes  Mild lactic acidosis  GERD  Anxiety  Hyperlipidemia  Hypertension  Hypothyroidism  Parotid mass  Uterine prolapse  History of C. difficile colitis        She has symptoms suggestive of a viral bronchitis two weeks ago and now wheezing soa.  Suspect post infectious hyperreactive airway syndromes syndrome  Symbicort  DuoNebs - would sne dhom on this with nebulizer dw her trial of just ipratropium or levalbuteorl, she wishes to continue albuterol with shakes as she feels it helps he.  Stop solumedrol   20 bid  Start pred 20mg given diabetic control and anxiety      Procalcitonin low arguing against active infection  proBNP low arguing against active fluid overload  D-dimer negative for her age arguing against pulmonary emboli.  White count 9.7 arguing against infection  Respiratory viral panel reassuring     CT chest very minimal pulmonary fibrosis stable compared to prior and not causative factor in her shortness of breath. This may need consideration of further evaluations as outpt based upon repeat ct high res and pfts.      Needs continued outpt pfts    Of note chest x-ray from the first not read    No pulm ci for dc with above.        Colin Escobar MD  [facility]  09/03/23  09:02 EDT

## 2023-09-05 NOTE — CASE MANAGEMENT/SOCIAL WORK
Case Management Discharge Note      Final Note: Home. Transport by private auto         Selected Continued Care - Discharged on 9/3/2023 Admission date: 9/1/2023 - Discharge disposition: Home or Self Care      Destination    No services have been selected for the patient.                Durable Medical Equipment    No services have been selected for the patient.                Dialysis/Infusion    No services have been selected for the patient.                Home Medical Care    No services have been selected for the patient.                Therapy    No services have been selected for the patient.                Community Resources    No services have been selected for the patient.                Community & DME    No services have been selected for the patient.                    Transportation Services  Private: Car    Final Discharge Disposition Code: 01 - home or self-care

## 2023-09-11 ENCOUNTER — TELEPHONE (OUTPATIENT)
Dept: DIABETES SERVICES | Facility: HOSPITAL | Age: 83
End: 2023-09-11
Payer: MEDICARE

## 2023-09-11 NOTE — TELEPHONE ENCOUNTER
Pt called and left message for diabetes information.   called pt back.  Pt requested written diabetes information sent to her via mail as well as email.  Both completed.  Encouraged pt to ask PCP for referral for outpatient visit for further education if needed.

## 2023-09-13 ENCOUNTER — CLINICAL SUPPORT (OUTPATIENT)
Dept: ORTHOPEDIC SURGERY | Facility: CLINIC | Age: 83
End: 2023-09-13
Payer: MEDICARE

## 2023-09-13 VITALS — BODY MASS INDEX: 36.66 KG/M2 | HEIGHT: 63 IN | WEIGHT: 206.9 LBS | TEMPERATURE: 97.5 F

## 2023-09-13 DIAGNOSIS — M25.512 CHRONIC LEFT SHOULDER PAIN: ICD-10-CM

## 2023-09-13 DIAGNOSIS — G89.29 CHRONIC RIGHT SHOULDER PAIN: Primary | ICD-10-CM

## 2023-09-13 DIAGNOSIS — G89.29 CHRONIC LEFT SHOULDER PAIN: ICD-10-CM

## 2023-09-13 DIAGNOSIS — M25.511 CHRONIC RIGHT SHOULDER PAIN: Primary | ICD-10-CM

## 2023-09-13 RX ORDER — METHYLPREDNISOLONE ACETATE 80 MG/ML
80 INJECTION, SUSPENSION INTRA-ARTICULAR; INTRALESIONAL; INTRAMUSCULAR; SOFT TISSUE
Status: COMPLETED | OUTPATIENT
Start: 2023-09-13 | End: 2023-09-13

## 2023-09-13 RX ORDER — LIDOCAINE HYDROCHLORIDE 20 MG/ML
2 INJECTION, SOLUTION INTRAVENOUS
Status: COMPLETED | OUTPATIENT
Start: 2023-09-13 | End: 2023-09-13

## 2023-09-13 RX ADMIN — METHYLPREDNISOLONE ACETATE 80 MG: 80 INJECTION, SUSPENSION INTRA-ARTICULAR; INTRALESIONAL; INTRAMUSCULAR; SOFT TISSUE at 14:35

## 2023-09-13 RX ADMIN — LIDOCAINE HYDROCHLORIDE 2 MG: 20 INJECTION, SOLUTION INTRAVENOUS at 14:35

## 2023-09-13 RX ADMIN — METHYLPREDNISOLONE ACETATE 80 MG: 80 INJECTION, SUSPENSION INTRA-ARTICULAR; INTRALESIONAL; INTRAMUSCULAR; SOFT TISSUE at 14:36

## 2023-09-13 RX ADMIN — LIDOCAINE HYDROCHLORIDE 2 MG: 20 INJECTION, SOLUTION INTRAVENOUS at 14:36

## 2023-09-13 NOTE — PROGRESS NOTES
Ms. Plascencia would like to get repeat injections today.  The risks, benefits and alternatives were discussed and the patient consented.  Going forward, the patient will follow-up as needed.    Venkata Hamilton MD    09/13/2023     Large Joint Arthrocentesis: R subacromial bursa  Date/Time: 9/13/2023 2:35 PM  Consent given by: patient  Site marked: site marked  Timeout: Immediately prior to procedure a time out was called to verify the correct patient, procedure, equipment, support staff and site/side marked as required   Supporting Documentation  Indications: pain   Procedure Details  Location: shoulder - R subacromial bursa  Preparation: Patient was prepped and draped in the usual sterile fashion  Needle gauge: 21G.  Approach: posterior  Medications administered: 80 mg methylPREDNISolone acetate 80 MG/ML; 2 mg Lidocaine HCl (Cardiac)  MG/5ML  Patient tolerance: patient tolerated the procedure well with no immediate complications      Large Joint Arthrocentesis: L subacromial bursa  Date/Time: 9/13/2023 2:36 PM  Consent given by: patient  Site marked: site marked  Timeout: Immediately prior to procedure a time out was called to verify the correct patient, procedure, equipment, support staff and site/side marked as required   Supporting Documentation  Indications: pain   Procedure Details  Location: shoulder - L subacromial bursa  Preparation: Patient was prepped and draped in the usual sterile fashion  Needle gauge: 21G.  Approach: posterior  Medications administered: 80 mg methylPREDNISolone acetate 80 MG/ML; 2 mg Lidocaine HCl (Cardiac)  MG/5ML  Patient tolerance: patient tolerated the procedure well with no immediate complications

## 2023-09-18 ENCOUNTER — TRANSCRIBE ORDERS (OUTPATIENT)
Dept: LAB | Facility: HOSPITAL | Age: 83
End: 2023-09-18
Payer: MEDICARE

## 2023-09-18 ENCOUNTER — LAB (OUTPATIENT)
Dept: LAB | Facility: HOSPITAL | Age: 83
End: 2023-09-18
Payer: MEDICARE

## 2023-09-18 DIAGNOSIS — E11.9 DIABETES MELLITUS WITHOUT COMPLICATION: ICD-10-CM

## 2023-09-18 DIAGNOSIS — J04.10 ACUTE TRACHEITIS WITHOUT OBSTRUCTION: ICD-10-CM

## 2023-09-18 DIAGNOSIS — E11.9 DIABETES MELLITUS WITHOUT COMPLICATION: Primary | ICD-10-CM

## 2023-09-18 DIAGNOSIS — K11.8 PAROTID MASS: ICD-10-CM

## 2023-09-18 LAB
ANION GAP SERPL CALCULATED.3IONS-SCNC: 13 MMOL/L (ref 5–15)
BASOPHILS # BLD AUTO: 0.06 10*3/MM3 (ref 0–0.2)
BASOPHILS NFR BLD AUTO: 0.4 % (ref 0–1.5)
BUN SERPL-MCNC: 30 MG/DL (ref 8–23)
BUN/CREAT SERPL: 28 (ref 7–25)
CALCIUM SPEC-SCNC: 10 MG/DL (ref 8.6–10.5)
CHLORIDE SERPL-SCNC: 100 MMOL/L (ref 98–107)
CO2 SERPL-SCNC: 24 MMOL/L (ref 22–29)
CREAT SERPL-MCNC: 1.07 MG/DL (ref 0.57–1)
DEPRECATED RDW RBC AUTO: 46.3 FL (ref 37–54)
EGFRCR SERPLBLD CKD-EPI 2021: 52 ML/MIN/1.73
EOSINOPHIL # BLD AUTO: 0.55 10*3/MM3 (ref 0–0.4)
EOSINOPHIL NFR BLD AUTO: 4 % (ref 0.3–6.2)
ERYTHROCYTE [DISTWIDTH] IN BLOOD BY AUTOMATED COUNT: 13.6 % (ref 12.3–15.4)
GLUCOSE SERPL-MCNC: 88 MG/DL (ref 65–99)
HCT VFR BLD AUTO: 42.1 % (ref 34–46.6)
HGB BLD-MCNC: 14 G/DL (ref 12–15.9)
IMM GRANULOCYTES # BLD AUTO: 0.07 10*3/MM3 (ref 0–0.05)
IMM GRANULOCYTES NFR BLD AUTO: 0.5 % (ref 0–0.5)
LYMPHOCYTES # BLD AUTO: 1.38 10*3/MM3 (ref 0.7–3.1)
LYMPHOCYTES NFR BLD AUTO: 10 % (ref 19.6–45.3)
MCH RBC QN AUTO: 30.9 PG (ref 26.6–33)
MCHC RBC AUTO-ENTMCNC: 33.3 G/DL (ref 31.5–35.7)
MCV RBC AUTO: 92.9 FL (ref 79–97)
MONOCYTES # BLD AUTO: 1.16 10*3/MM3 (ref 0.1–0.9)
MONOCYTES NFR BLD AUTO: 8.4 % (ref 5–12)
NEUTROPHILS NFR BLD AUTO: 10.63 10*3/MM3 (ref 1.7–7)
NEUTROPHILS NFR BLD AUTO: 76.7 % (ref 42.7–76)
NRBC BLD AUTO-RTO: 0 /100 WBC (ref 0–0.2)
PLATELET # BLD AUTO: 259 10*3/MM3 (ref 140–450)
PMV BLD AUTO: 8.9 FL (ref 6–12)
POTASSIUM SERPL-SCNC: 4.1 MMOL/L (ref 3.5–5.2)
RBC # BLD AUTO: 4.53 10*6/MM3 (ref 3.77–5.28)
SODIUM SERPL-SCNC: 137 MMOL/L (ref 136–145)
WBC NRBC COR # BLD: 13.85 10*3/MM3 (ref 3.4–10.8)

## 2023-09-18 PROCEDURE — 80048 BASIC METABOLIC PNL TOTAL CA: CPT

## 2023-09-18 PROCEDURE — 85025 COMPLETE CBC W/AUTO DIFF WBC: CPT

## 2023-09-18 PROCEDURE — 36415 COLL VENOUS BLD VENIPUNCTURE: CPT

## 2023-09-25 DIAGNOSIS — J04.10 ACUTE TRACHEITIS WITHOUT OBSTRUCTION: ICD-10-CM

## 2023-09-25 DIAGNOSIS — K11.8 PAROTID MASS: Primary | ICD-10-CM

## 2023-09-25 NOTE — PROGRESS NOTES
REASON FOR CONSULTATION: Potential lymphoma involving parotid gland                               REQUESTING PHYSICIAN: Katarzyna Bland MD, Alek Brunson MD    RECORDS OBTAINED:  Records of the patients history including those obtained from the referring provider were reviewed and summarized in detail.    HISTORY OF PRESENT ILLNESS:  The patient is a 82 y.o. year old female who is here for an opinion about the above issue.    History of Present Illness     The patient is an  82-year-old female followed by primary care for the below medical disorders who had undergone studies recently including February 20, 2023 when she had increasing shortness of breath with a CTA that failed to show any evidence of pulmonary embolism.  Additional duplex lower extremities included left popliteal fossa, normal bilateral lower extremity studies and, with a history of idiopathic pulmonary fibrosis a high-resolution chest CT 4/11/2023 showed bibasilar atelectasis, curvilinear opacity in the right lower lobe and subpleural reticular nodule opacities, calcified and noncalcified micronodules without substantial change from previous.    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  There were mildly prominent nodes throughout the neck but mostly within the right level 2 and right level 3 regions?  Metastatic adenopathy.      These findings led to an ultrasound-guided lymph node biopsy 6/13/2023 that was felt to be an atypical lymphoid infiltrate that, upon pathologic review by [facility] labs, was concerning for a T-cell malignancy such that the entire mass was was felt to require removal.  Immunohistochemical stains are essentially negative  "but significant for CD3 and CD5, interspersed CD20, Bcl-2 positive BCx and cyclin D1 negative.    n additional biopsy obtained now core sampling reveals lymphoid tissue with interfollicular expansion by an atypical mixed lymphohistiocytic and eosinophilic infiltrate with a background of reactive lymphoid hyperplasia.      Please note both the biopsies suggest excisional biopsy.      Concurrently she has considerable orthopedic issues including suspected bilateral rotator cuff tears assessed in April treated with injection therapy 6/7/2023 through orthopedics.       We are asked to see the patient for her potential lymphoma.  She indicates that she has been having symptoms for several months at least from December of general weakness and fatigue as her analysis has been continuing.  Please see review of systems below.  She had noted over the 2 to 3-month period of development of right parotid gland swelling nonpainful and not progressing clearly in size more recently but clearly noticeable over the 2 to 3 months.  As result of the biopsy the area has \"changed\".  She has not had fever, chills, night sweats but clearly reduction in performance status.  She indicates that some of this was affected by development of COVID from which she thought she had recovered.    Additional family history include the parotid gland enlargement-benign-and her daughter who attends with her in office visit 8/8/2023 in the history and her son of high-grade lymphoma diagnosed 1985 treated with apparent [facility] chemotherapy.  He has not had a relapse.    The patient underwent studies including normal CBC, CMP, , sed rate of 12, CRP of less than 0.3, paraprotein analysis with no monoclonal spike, free kappa light chain 22.1, free lambda light chain 12.3 and ratio of 1.80, RF less than 10, TIGRE comprehensive antiscleroderma-70 antibody of 2.0, amylase of 66, TSH of 2.30 and CT of neck, chest, abdomen and pelvis.  CTs demonstrate a mass in " the posterior inferior tip of the superficial lobe of the right parotid gland is unchanged to equivocally larger 1 2 mm from 5/12/2023 measuring 2.3 x 1.7 x 2.5 compared to 2.1 x 1.6 x 2.5 obtained 5/12/2023.  There is a stable 5 mm lymph node just inferior to the posterior inferior tip of the superficial lobe of the right parotid gland and a 12 x 11 mm lymph node in superior hyoid right jugulodigastric chain thought to be reactive, mild asymmetric prominence right right inguinal tonsils compared to the left, stable cervical spondylosis with posterior posterior disc osteophyte complex with moderate central canal narrowing C5-6, posterior spurs with adjacent ossification with moderate severe canal narrowing at C6/7 and bilateral foraminal narrowing at C5-6 and C6-7.    CT chest, abdomen, pelvis with no lymphadenopathy, normal splenic size, extensive pelvic floor relaxation with probable rectocele and some degree of vaginal prolapse.    The patient is seen 8/29/2023 unfortunately with a refractory cough.  This has been reviewed by both primary care and urgent care, treated with steroids for a potential viral illness.  We have discussed her findings thus far and she is to be seen by ENT 8/30/2023 likely for further evaluation of her parotid gland perhaps by resection.    The patient was seen by ENT with plans to follow her rather than go directly to surgery.    She was admitted 9/1 - 3/2023 with shortness of breath and wheezing and admission with hypoxia.  Findings were consistent with reactive airway disease and not, necessarily, pulmonary fibrosis.  She was transitioned off of steroids and continued PPI therapy.  She continued to treat for hyperglycemia and was able to be discharged.  9/13/2023 she underwent additional steroid injections to both right and left shoulders.    She is seen formally 9/26/2023.  Unfortunately she continues have episodes of shortness of breath that she believes are, in part, related to  anxiety.  She request a psychiatric assessment indicating that medications have been attempted without much effect and then she has been told by pulmonary medicine, noted above, that she does not have worsening pulmonary disease that remains tachypneic particularly in stressful situations.      Past Medical History:   Diagnosis Date    Anxiety     Arthritis     C. difficile colitis     Disease of thyroid gland     Diverticulitis     GERD (gastroesophageal reflux disease)     H/O uterine prolapse     Hyperlipidemia     Hypertension     Hypothyroidism     Knee swelling 2011    Had right knee replaced first … had to choose which knee first    Osteopenia     Periarthritis of shoulder 9/26/2022    Dr. Hamilton visit    Prediabetes     Rotator cuff syndrome 9-    Dr. Hamilton diagnosed on first visit…mild    Scoliosis     Shoulder injury     Tear of meniscus of knee 2011    Had both knees replaced 2011/2022    Type 2 diabetes mellitus with hyperglycemia 09/03/2023    Uterine prolapse         Past Surgical History:   Procedure Laterality Date    CHOLECYSTECTOMY      COLONOSCOPY      ENDOSCOPY      ENDOSCOPY N/A 05/25/2021    Procedure: ESOPHAGOGASTRODUODENOSCOPY WITH [address] DILATION;  Surgeon: Tripp Pascal MD;  Location: [facility] ENDOSCOPY;  Service: Gastroenterology;  Laterality: N/A;  PRE- DYSPHAGIA  POST- ESOPHAGEAL RING    JOINT REPLACEMENT Right     KNEE    KNEE SURGERY      TONSILLECTOMY      TOTAL KNEE ARTHROPLASTY Left 11/22/2022    Procedure: LEFT TOTAL KNEE ARTHROPLASTY;  Surgeon: Antonio Riggins MD;  Location: [facility] OR [facility];  Service: Orthopedics;  Laterality: Left;        Current Outpatient Medications on File Prior to Visit   Medication Sig Dispense Refill    atorvastatin (LIPITOR) 10 MG tablet Take 1 tablet by mouth Every Other Day.      celecoxib (CeleBREX) 200 MG capsule Every Night.      Cholecalciferol (VITAMIN D3) 5000 UNITS capsule capsule Take 1 capsule by mouth Daily.       esomeprazole (nexIUM) 40 MG capsule       Hydrocod Bebo-Chlorphe Bebo ER (TUSSIONEX PENNKINETIC) 10-8 MG/5ML ER suspension Take 5 mL by mouth Every 12 (Twelve) Hours As Needed for Cough. 120 mL 0    pantoprazole (PROTONIX) 40 MG EC tablet Take 1 tablet by mouth 2 (Two) Times a Day. 60 tablet 0    polyethylene glycol (MIRALAX) 17 GM/SCOOP powder Take 17 g by mouth Daily.      Synthroid 100 MCG tablet       vitamin C (ASCORBIC ACID) 500 MG tablet Take 1 tablet by mouth Daily.      zolpidem (AMBIEN) 5 MG tablet Take 1 tablet by mouth At Night As Needed for Sleep.      budesonide-formoterol (SYMBICORT) 160-4.5 MCG/ACT inhaler Inhale 2 puffs 2 (Two) Times a Day. 1 each 12    docusate sodium (COLACE) 50 MG capsule Take 2 capsules by mouth Every Night.      metFORMIN (Glucophage) 500 MG tablet Take 1 tablet by mouth 2 (Two) Times a Day With Meals. Take the metformin once daily for the first 2 weeks then begin taking twice daily (Patient not taking: Reported on 2023) 60 tablet 0    nystatin-triamcinolone (MYCOLOG) 695625-2.1 UNIT/GM-% ointment nystatin-triamcinolone 100,000 unit/gram-0.1 % topical ointment       No current facility-administered medications on file prior to visit.        ALLERGIES:    Allergies   Allergen Reactions    Sulfa Antibiotics Itching     Topical        Social History     Socioeconomic History    Marital status: Single   Tobacco Use    Smoking status: Never    Smokeless tobacco: Never    Tobacco comments:     Tried in college… didn’t last   Vaping Use    Vaping Use: Never used   Substance and Sexual Activity    Alcohol use: Not Currently    Drug use: Never    Sexual activity: Not Currently     Partners: Male     Birth control/protection: Pill, Diaphragm, Birth control pill     Comment: I’m 82… N/A        Family History   Problem Relation Age of Onset    Cancer Mother          1964    Early death Mother     Heart attack Father     Heart disease Father     Migraines Daughter     Michelle  "Hyperthermia Neg Hx         Review of Systems   Constitutional:  Positive for activity change, fatigue and unexpected weight change (Weight gain- 35 lbs - 2 years).   HENT:  Positive for facial swelling. Negative for dental problem, ear discharge, ear pain and trouble swallowing.    Eyes: Negative.    Respiratory:  Positive for cough, shortness of breath and wheezing. Negative for chest tightness.    Cardiovascular:  Positive for leg swelling.   Gastrointestinal:  Positive for constipation.        Esophogeal stricture   Endocrine: Positive for heat intolerance.   Genitourinary:  Positive for frequency.   Musculoskeletal:  Positive for arthralgias (Shoulders- bilaterally) and joint swelling.   Neurological: Negative.    Psychiatric/Behavioral:  The patient is nervous/anxious.       Objective     Vitals:    09/26/23 1447   BP: 131/84   Pulse: 97   Resp: 18   Temp: 97.5 °F (36.4 °C)   TempSrc: Temporal   SpO2: 92%   Weight: 90.5 kg (199 lb 9.6 oz)   Height: 160 cm (62.99\")   PainSc: 0-No pain         9/26/2023     2:50 PM   Current Status   ECOG score 1       Physical Exam  Constitutional:       Appearance: She is obese.   HENT:      Head: Normocephalic and atraumatic.      Nose: Nose normal.      Mouth/Throat:      Mouth: Mucous membranes are moist.   Eyes:      Extraocular Movements: Extraocular movements intact.      Conjunctiva/sclera: Conjunctivae normal.      Pupils: Pupils are equal, round, and reactive to light.   Neck:      Comments: Enlarged right parotid gland that is now reduced approximately 2 cm greatest diameter, nontender  Cardiovascular:      Rate and Rhythm: Normal rate and regular rhythm.      Pulses: Normal pulses.      Heart sounds: Normal heart sounds.   Pulmonary:      Breath sounds: Normal breath sounds.      Comments: Mildly tachypneic  Abdominal:      General: Bowel sounds are normal.      Palpations: Abdomen is soft.   Musculoskeletal:      Cervical back: Normal range of motion.      Right " lower leg: Edema (Trace to 1+) present.      Left lower leg: Edema (Trace to 1+) present.   Skin:     General: Skin is warm and dry.   Neurological:      General: No focal deficit present.      Mental Status: She is oriented to person, place, and time.   Psychiatric:         Behavior: Behavior normal.      Comments: Clear anxiety         RECENT LABS:  Hematology WBC   Date Value Ref Range Status   09/26/2023 11.21 (H) 3.40 - 10.80 10*3/mm3 Final     RBC   Date Value Ref Range Status   09/26/2023 4.87 3.77 - 5.28 10*6/mm3 Final     Hemoglobin   Date Value Ref Range Status   09/26/2023 14.7 12.0 - 15.9 g/dL Final     Hematocrit   Date Value Ref Range Status   09/26/2023 45.0 34.0 - 46.6 % Final     Platelets   Date Value Ref Range Status   09/26/2023 243 140 - 450 10*3/mm3 Final          Assessment & Plan   The patient is a 82-year-old female:    with a history of arthritis, diverticulitis, GE reflux, hyperlipidemia, hypertension, hypothyroidism, prediabetes, shoulder injury and studies, more recently, demonstrating a mass and superficial of the right parotid gland that was concerning either benign or malignant tumor versus sialocele.  There is minimal lymphadenopathy throughout the neck otherwise and additional scans done, including those for IPF, demonstrate subpleural reticular nodular opacities and noncalcified micronodules.    These findings led to ultrasound-guided lymph node biopsy 6/13/2023 that was felt to be an atypical lymphoid infiltrate that, upon pathologic review by [facility] labs, was concerning for a T-cell malignancy such that the entire mass was was felt to require removal.  Immunohistochemical stains are essentially negative but significant for CD3 and CD5, interspersed CD20, Bcl-2 positive BCx and cyclin D1 negative.    An additional biopsy obtained now core sampling reveals lymphoid tissue with interfollicular expansion by an atypical mixed lymphohistiocytic and eosinophilic infiltrate with a  background of reactive lymphoid hyperplasia.      Please note both the biopsies suggest excisional biopsy.    The patient is seen with her daughter and we have had a lengthy conversation as to how to proceed.  At this point it may be more prudent to have her staged as if she had been diagnosed with lymphoma looking for other sites that might be more accessible for biopsy.     The patient underwent studies including normal CBC, CMP, , sed rate of 12, CRP of less than 0.3, paraprotein analysis with no monoclonal spike, free kappa light chain 22.1, free lambda light chain 12.3 and ratio of 1.80, RF less than 10, TIGRE comprehensive antiscleroderma-70 antibody of 2.0, amylase of 66, TSH of 2.30 and CT of neck, chest, abdomen and pelvis.  CTs demonstrate a mass in the posterior inferior tip of the superficial lobe of the right parotid gland is unchanged to equivocally larger 1 2 mm from 5/12/2023 measuring 2.3 x 1.7 x 2.5 compared to 2.1 x 1.6 x 2.5 obtained 5/12/2023.  There is a stable 5 mm lymph node just inferior to the posterior inferior tip of the superficial lobe of the right parotid gland and a 12 x 11 mm lymph node in superior hyoid right jugulodigastric chain thought to be reactive, mild asymmetric prominence right right inguinal tonsils compared to the left, stable cervical spondylosis with posterior posterior disc osteophyte complex with moderate central canal narrowing C5-6, posterior spurs with adjacent ossification with moderate severe canal narrowing at C6/7 and bilateral foraminal narrowing at C5-6 and C6-7.    CT chest, abdomen, pelvis with no lymphadenopathy, normal splenic size, extensive pelvic floor relaxation with probable rectocele and some degree of vaginal prolapse.    The patient is seen 8/29/2023 unfortunately with a refractory cough.  This has been reviewed by both primary care and urgent care, treated with steroids for a potential viral illness.  We have discussed her findings thus far  and she is to be seen by ENT 8/30/2023 likely for further evaluation of her parotid gland perhaps by resection.    The patient was seen by ENT with plans to follow her rather than go directly to surgery.    She was admitted 9/1 - 3/2023 with shortness of breath and wheezing and admission with hypoxia.  Findings were consistent with reactive airway disease and not, necessarily, pulmonary fibrosis.  She was transitioned off of steroids and continued PPI therapy.  She continued to treat for hyperglycemia and was able to be discharged.  9/13/2023 she underwent additional steroid injections to both right and left shoulders.    She is seen formally 9/26/2023.  Unfortunately she continues have episodes of shortness of breath that she believes are, in part, related to anxiety.  She request a psychiatric assessment indicating that medications have been attempted without much effect and then she has been told by pulmonary medicine, noted above, that she does not have worsening pulmonary disease that remains tachypneic particularly in stressful situations.        Plan:    *Tussionex suspension continues to be available 5 cc p.o. every 12 hours for cough suppression    *ENT assessment 8/30/2023 as above    *Supportive oncology assessment    *Copy to primary care    *6-month MD, CBC, CMP

## 2023-09-26 ENCOUNTER — OFFICE VISIT (OUTPATIENT)
Dept: ONCOLOGY | Facility: CLINIC | Age: 83
End: 2023-09-26
Payer: MEDICARE

## 2023-09-26 ENCOUNTER — LAB (OUTPATIENT)
Dept: OTHER | Facility: HOSPITAL | Age: 83
End: 2023-09-26
Payer: MEDICARE

## 2023-09-26 VITALS
BODY MASS INDEX: 35.37 KG/M2 | TEMPERATURE: 97.5 F | HEART RATE: 97 BPM | WEIGHT: 199.6 LBS | DIASTOLIC BLOOD PRESSURE: 84 MMHG | RESPIRATION RATE: 18 BRPM | OXYGEN SATURATION: 92 % | HEIGHT: 63 IN | SYSTOLIC BLOOD PRESSURE: 131 MMHG

## 2023-09-26 DIAGNOSIS — K11.8 PAROTID MASS: ICD-10-CM

## 2023-09-26 DIAGNOSIS — J04.10 ACUTE TRACHEITIS WITHOUT OBSTRUCTION: ICD-10-CM

## 2023-09-26 DIAGNOSIS — F41.9 ANXIETY: Primary | ICD-10-CM

## 2023-09-26 DIAGNOSIS — E03.9 HYPOTHYROIDISM (ACQUIRED): ICD-10-CM

## 2023-09-26 LAB
BASOPHILS # BLD AUTO: 0.06 10*3/MM3 (ref 0–0.2)
BASOPHILS NFR BLD AUTO: 0.5 % (ref 0–1.5)
DEPRECATED RDW RBC AUTO: 50 FL (ref 37–54)
EOSINOPHIL # BLD AUTO: 0.59 10*3/MM3 (ref 0–0.4)
EOSINOPHIL NFR BLD AUTO: 5.3 % (ref 0.3–6.2)
ERYTHROCYTE [DISTWIDTH] IN BLOOD BY AUTOMATED COUNT: 14.6 % (ref 12.3–15.4)
HCT VFR BLD AUTO: 45 % (ref 34–46.6)
HGB BLD-MCNC: 14.7 G/DL (ref 12–15.9)
IMM GRANULOCYTES # BLD AUTO: 0.05 10*3/MM3 (ref 0–0.05)
IMM GRANULOCYTES NFR BLD AUTO: 0.4 % (ref 0–0.5)
LYMPHOCYTES # BLD AUTO: 1.73 10*3/MM3 (ref 0.7–3.1)
LYMPHOCYTES NFR BLD AUTO: 15.4 % (ref 19.6–45.3)
MCH RBC QN AUTO: 30.2 PG (ref 26.6–33)
MCHC RBC AUTO-ENTMCNC: 32.7 G/DL (ref 31.5–35.7)
MCV RBC AUTO: 92.4 FL (ref 79–97)
MONOCYTES # BLD AUTO: 0.99 10*3/MM3 (ref 0.1–0.9)
MONOCYTES NFR BLD AUTO: 8.8 % (ref 5–12)
NEUTROPHILS NFR BLD AUTO: 69.6 % (ref 42.7–76)
NEUTROPHILS NFR BLD AUTO: 7.79 10*3/MM3 (ref 1.7–7)
NRBC BLD AUTO-RTO: 0 /100 WBC (ref 0–0.2)
PLATELET # BLD AUTO: 243 10*3/MM3 (ref 140–450)
PMV BLD AUTO: 9.5 FL (ref 6–12)
RBC # BLD AUTO: 4.87 10*6/MM3 (ref 3.77–5.28)
WBC NRBC COR # BLD: 11.21 10*3/MM3 (ref 3.4–10.8)

## 2023-09-26 PROCEDURE — 99214 OFFICE O/P EST MOD 30 MIN: CPT | Performed by: INTERNAL MEDICINE

## 2023-09-26 PROCEDURE — 85025 COMPLETE CBC W/AUTO DIFF WBC: CPT | Performed by: INTERNAL MEDICINE

## 2023-09-26 PROCEDURE — 36415 COLL VENOUS BLD VENIPUNCTURE: CPT

## 2023-09-26 PROCEDURE — 1126F AMNT PAIN NOTED NONE PRSNT: CPT | Performed by: INTERNAL MEDICINE

## 2023-09-26 PROCEDURE — 3075F SYST BP GE 130 - 139MM HG: CPT | Performed by: INTERNAL MEDICINE

## 2023-09-26 PROCEDURE — 3079F DIAST BP 80-89 MM HG: CPT | Performed by: INTERNAL MEDICINE

## 2023-09-26 RX ORDER — ESOMEPRAZOLE MAGNESIUM 40 MG/1
CAPSULE, DELAYED RELEASE ORAL
COMMUNITY
Start: 2023-09-24

## 2023-10-03 ENCOUNTER — OFFICE VISIT (OUTPATIENT)
Dept: PSYCHIATRY | Facility: HOSPITAL | Age: 83
End: 2023-10-03
Payer: MEDICARE

## 2023-10-03 DIAGNOSIS — F43.21 GRIEF: Primary | ICD-10-CM

## 2023-10-03 DIAGNOSIS — F41.9 ANXIETY: ICD-10-CM

## 2023-10-03 DIAGNOSIS — R25.1 TREMOR: ICD-10-CM

## 2023-10-03 NOTE — PROGRESS NOTES
"  In person, new psychiatric evaluation  Provider Location: [facility]    Chief Complaint:  Grief, Tremors, Anxiety    Subjective  Patient ID: Madisyn Plascencia is a 82 y.o. 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 Per Dr Neville's notes, \"She is seen formally 2023.  Unfortunately she continues have episodes of shortness of breath that she believes are, in part, related to anxiety.  She request a psychiatric assessment indicating that medications have been attempted without much effect and then she has been told by pulmonary medicine, noted above, that she does not have worsening pulmonary disease that remains tachypneic particularly in stressful situations.\"     PHQ9  Total Score: 4  She endorsed several days within the past two weeks of feeling down, feeling tired, having a poor appetite within the last two weeks, and moving or speaking so slowly that others have noticed.    DANE 7 Total Score: 1   She endorsed several days within the past two weeks of feeling nervous, anxious, or on edge.    MMSE Total Score: 30/30, no cognitive impairment indicated      HPI:  The patient is a pleasant 82 year old  female. Records reviewed. She states she is not sure why she is here today. She reported going to her best friend's  just yesterday. She expressed sadness over her loss and over the loss of many of her loved ones through the years, while also coming to terms with her own mortality. She was provided support through active listening, empathy, and validation of her feelings.     She reports that she originally developed some intermittent tremors in her upper extremities when COVID first started and that it has continued off and on since. She discusses how one day while in " "the pool, she just started shaking and has had increased difficulty doing the things she enjoys now due to these episodes, in addition to other health issues that have occurred, such as a knee replacement in November, and several episodes of upper respiratory issues, including hospitalization. She reports that she has had a number of evaluations, has been cleared by several specialists, and no one can seem to tell her what the cause of her tremors are. She reports having been very active in the past, golfing and going to [employer], doing water aerobics, and being spontaneous with her friends. 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     She feels there are times when she sleeps too much, but intermittently takes Ambien for overall sleep quality improvement. She has not taken any in the last three days and was taking them infrequently prior to this. She reports cutting them in half and only taking them as needed at bedtime to decrease oversedation. She is managed by her PCP.  She reports feeling rested upon awakening. She reports having multiple interests, but feels she is not able to participate as much as she'd like because of her tremors, reporting it is difficult to exercise or golf when she shakes. She reports good energy and concentration. She has been having a decreased appetite lately and has mainly been eating things like cheerios or a BLT, stating she is trying to be more careful with her diet since being diagnosed with diabetes, having gone from 215 lbs down to 197 lbs. She hopes to restart at [facility] to continue her weight loss and healthy lifestyle.     She denies SI, HI, AVH. She denies feeling anxious or depressed now or in the past, but is sad due to her recent loss of her best friend. She reports [facility] \"[facility]akiness\" and has been tried on " some medications previously, including Lexapro and Effexor, taking each for a few months at a time and then stopping because she didn't feel like they were helping her. She denies feeling anxious or upset when she has tremors and doesn't understand what the cause is. She denies a history or signs and symptoms of depression, anxiety, or panic disorder.     Discussed her current medical diagnosis and possible contributing factors, including signs and symptoms of her disease processes, and side effects of her medications that could contribute to physical symptoms, including a feeling of shakiness. Discussed unprocessed grief and how it can manifest in physical forms as well. Offered talk therapy and discussed medications that may help manage some of her symptoms. She denies wanting medication management or talk therapy at this time. She feels she has good support and prefers to continue having her PCP manage her overall care. Discussed being available to her for additional support in the future and encouraged talk therapy to process her current and past grief.           Social History  Marital Status: single  Children: 3, 2 sons and 1 daughter  Support Community: Family and friends  Highest Level of Education: college graduate  Career: Retired highschool teacher (senior class)  Tobacco Use: The patient denies any use. She tried it in college and didn't like it.  Alcohol Use: does not drink  Marijuana/ Other drug Use: denied.    Medical History  Psychiatric History: Has been seen by providers at [facility], had brief previous trials on Effexor and Lexapro, but didn't feel like any of the medications she tried worked. Her PCP is managing her on Ambien for sleep. She denies any other inpatient or outpatient history.    Family History  Family Psychiatric History: No known history  Family Cancer History:  Daughter - benign history of parotid gland enlargement; Son - high-grade lymphoma diagnosed 1985 and treated with  apparent CHOP chemotherapy, currently without relapse.     The following portions of the patient's history were reviewed and updated as appropriate: She  has a past medical history of Anxiety, Arthritis, C. difficile colitis, Disease of thyroid gland, Diverticulitis, GERD (gastroesophageal reflux disease), H/O uterine prolapse, Hyperlipidemia, Hypertension, Hypothyroidism, Knee swelling (2011), Osteopenia, Periarthritis of shoulder (9/26/2022), Prediabetes, Rotator cuff syndrome (9-), Scoliosis, Shoulder injury, Tear of meniscus of knee (2011), Tremor (10/6/2023), Type 2 diabetes mellitus with hyperglycemia (09/03/2023), and Uterine prolapse.  She does not have any pertinent problems on file.  She  has a past surgical history that includes Cholecystectomy; Joint replacement (Right); Esophagogastroduodenoscopy; Colonoscopy; Tonsillectomy; Esophagogastroduodenoscopy (N/A, 05/25/2021); Knee surgery; and Total knee arthroplasty (Left, 11/22/2022).  Her family history includes Cancer in her mother; Early death in her mother; Heart attack in her father; Heart disease in her father; Migraines in her daughter.  She  reports that she has never smoked. She has never used smokeless tobacco. She reports that she does not currently use alcohol. She reports that she does not use drugs.  Current Outpatient Medications   Medication Sig Dispense Refill    atorvastatin (LIPITOR) 10 MG tablet Take 1 tablet by mouth Every Other Day.             celecoxib (CeleBREX) 200 MG capsule Every Night.      Cholecalciferol (VITAMIN D3) 5000 UNITS capsule capsule Take 1 capsule by mouth Daily.                    Hydrocod Bebo-Chlorphe Bebo ER (TUSSIONEX PENNKINETIC) 10-8 MG/5ML ER suspension Take 5 mL by mouth Every 12 (Twelve) Hours As Needed for Cough. 120 mL 0                  pantoprazole (PROTONIX) 40 MG EC tablet Take 1 tablet by mouth 2 (Two) Times a Day. 60 tablet 0    polyethylene glycol (MIRALAX) 17 GM/SCOOP powder Take 17 g by  mouth Daily.      Synthroid 100 MCG tablet       vitamin C (ASCORBIC ACID) 500 MG tablet Take 1 tablet by mouth Daily.      zolpidem (AMBIEN) 5 MG tablet Take 1 tablet by mouth At Night As Needed for Sleep.       She is allergic to sulfa antibiotics - causes itching.      Objective   Mental Status Exam  Appearance:  clean and casually dressed, appropriate and neat   Attitude toward clinician:  cooperative and agreeable , good eye contact  Speech:    Rate:  regular rate and rhythm   Volume:  normal  Motor:  no abnormal movements present  Mood:  Euthymic  Affect:  euthymic and mood congruent  Thought Processes:  linear, logical, and goal directed, tangential at times  Thought Content:  normal  Suicidal Thoughts:  absent  Homicidal Thoughts:  absent  Perceptual Disturbance: no perceptual disturbance  Attention and Concentration:  fair  Insight and Judgement:  limited  Memory:  memory appears to be intact    Gait was slow and steady, uses a cane for additional support.    Lab Review:   Lab on 09/26/2023   Component Date Value    WBC 09/26/2023 11.21 (H)     RBC 09/26/2023 4.87     Hemoglobin 09/26/2023 14.7     Hematocrit 09/26/2023 45.0     MCV 09/26/2023 92.4     MCH 09/26/2023 30.2     MCHC 09/26/2023 32.7     RDW 09/26/2023 14.6     RDW-SD 09/26/2023 50.0     MPV 09/26/2023 9.5     Platelets 09/26/2023 243     Neutrophil % 09/26/2023 69.6     Lymphocyte % 09/26/2023 15.4 (L)     Monocyte % 09/26/2023 8.8     Eosinophil % 09/26/2023 5.3     Basophil % 09/26/2023 0.5     Immature Grans % 09/26/2023 0.4     Neutrophils, Absolute 09/26/2023 7.79 (H)     Lymphocytes, Absolute 09/26/2023 1.73     Monocytes, Absolute 09/26/2023 0.99 (H)     Eosinophils, Absolute 09/26/2023 0.59 (H)     Basophils, Absolute 09/26/2023 0.06     Immature Grans, Absolute 09/26/2023 0.05     nRBC 09/26/2023 0.0    Lab on 09/18/2023   Component Date Value    Glucose 09/18/2023 88     BUN 09/18/2023 30 (H)     Creatinine 09/18/2023 1.07 (H)      Sodium 09/18/2023 137     Potassium 09/18/2023 4.1     Chloride 09/18/2023 100     CO2 09/18/2023 24.0     Calcium 09/18/2023 10.0     BUN/Creatinine Ratio 09/18/2023 28.0 (H)     Anion Gap 09/18/2023 13.0     eGFR 09/18/2023 52.0 (L)     WBC 09/18/2023 13.85 (H)     RBC 09/18/2023 4.53     Hemoglobin 09/18/2023 14.0     Hematocrit 09/18/2023 42.1     MCV 09/18/2023 92.9     MCH 09/18/2023 30.9     MCHC 09/18/2023 33.3     RDW 09/18/2023 13.6     RDW-SD 09/18/2023 46.3     MPV 09/18/2023 8.9     Platelets 09/18/2023 259     Neutrophil % 09/18/2023 76.7 (H)     Lymphocyte % 09/18/2023 10.0 (L)     Monocyte % 09/18/2023 8.4     Eosinophil % 09/18/2023 4.0     Basophil % 09/18/2023 0.4     Immature Grans % 09/18/2023 0.5     Neutrophils, Absolute 09/18/2023 10.63 (H)     Lymphocytes, Absolute 09/18/2023 1.38     Monocytes, Absolute 09/18/2023 1.16 (H)     Eosinophils, Absolute 09/18/2023 0.55 (H)     Basophils, Absolute 09/18/2023 0.06     Immature Grans, Absolute 09/18/2023 0.07 (H)     nRBC 09/18/2023 0.0    Admission on 09/01/2023, Discharged on 09/03/2023   Component Date Value    Glucose 09/01/2023 98     BUN 09/01/2023 21     Creatinine 09/01/2023 0.84     Sodium 09/01/2023 139     Potassium 09/01/2023 4.8     Chloride 09/01/2023 100     CO2 09/01/2023 27.7     Calcium 09/01/2023 9.9     Total Protein 09/01/2023 7.0     Albumin 09/01/2023 4.4     ALT (SGPT) 09/01/2023 27     AST (SGOT) 09/01/2023 22     Alkaline Phosphatase 09/01/2023 96     Total Bilirubin 09/01/2023 0.4     Globulin 09/01/2023 2.6     A/G Ratio 09/01/2023 1.7     BUN/Creatinine Ratio 09/01/2023 25.0     Anion Gap 09/01/2023 11.3     eGFR 09/01/2023 69.5     WBC 09/01/2023 9.72     RBC 09/01/2023 4.13     Hemoglobin 09/01/2023 12.9     Hematocrit 09/01/2023 39.1     MCV 09/01/2023 94.7     MCH 09/01/2023 31.2     MCHC 09/01/2023 33.0     RDW 09/01/2023 13.4     RDW-SD 09/01/2023 46.8     MPV 09/01/2023 9.4     Platelets 09/01/2023 195      Neutrophil % 09/01/2023 63.8     Lymphocyte % 09/01/2023 16.9 (L)     Monocyte % 09/01/2023 11.4     Eosinophil % 09/01/2023 6.8 (H)     Basophil % 09/01/2023 0.6     Immature Grans % 09/01/2023 0.5     Neutrophils, Absolute 09/01/2023 6.20     Lymphocytes, Absolute 09/01/2023 1.64     Monocytes, Absolute 09/01/2023 1.11 (H)     Eosinophils, Absolute 09/01/2023 0.66 (H)     Basophils, Absolute 09/01/2023 0.06     Immature Grans, Absolute 09/01/2023 0.05     nRBC 09/01/2023 0.0     Lactate 09/01/2023 1.1     Procalcitonin 09/01/2023 0.06     D-Dimer, Quantitative 09/01/2023 0.60     proBNP 09/01/2023 52.9     ADENOVIRUS, PCR 09/01/2023 Not Detected     Coronavirus 229E 09/01/2023 Not Detected     Coronavirus HKU1 09/01/2023 Not Detected     Coronavirus NL63 09/01/2023 Not Detected     Coronavirus OC43 09/01/2023 Not Detected     COVID19 09/01/2023 Not Detected     Human Metapneumovirus 09/01/2023 Not Detected     Human Rhinovirus/Enterov* 09/01/2023 Not Detected     Influenza A PCR 09/01/2023 Not Detected     Influenza B PCR 09/01/2023 Not Detected     Parainfluenza Virus 1 09/01/2023 Not Detected     Parainfluenza Virus 2 09/01/2023 Not Detected     Parainfluenza Virus 3 09/01/2023 Not Detected     Parainfluenza Virus 4 09/01/2023 Not Detected     RSV, PCR 09/01/2023 Not Detected     Bordetella pertussis pcr 09/01/2023 Not Detected     Bordetella parapertussis* 09/01/2023 Not Detected     Chlamydophila pneumoniae* 09/01/2023 Not Detected     Mycoplasma pneumo by PCR 09/01/2023 Not Detected     Glucose 09/02/2023 240 (H)     BUN 09/02/2023 21     Creatinine 09/02/2023 0.86     Sodium 09/02/2023 134 (L)     Potassium 09/02/2023 5.6 (H)     Chloride 09/02/2023 99     CO2 09/02/2023 23.1     Calcium 09/02/2023 9.8     Total Protein 09/02/2023 6.8     Albumin 09/02/2023 3.9     ALT (SGPT) 09/02/2023 27     AST (SGOT) 09/02/2023 27     Alkaline Phosphatase 09/02/2023 85     Total Bilirubin 09/02/2023 0.3     Globulin  09/02/2023 2.9     A/G Ratio 09/02/2023 1.3     BUN/Creatinine Ratio 09/02/2023 24.4     Anion Gap 09/02/2023 11.9     eGFR 09/02/2023 67.5     WBC 09/02/2023 9.96     RBC 09/02/2023 4.05     Hemoglobin 09/02/2023 12.6     Hematocrit 09/02/2023 38.4     MCV 09/02/2023 94.8     MCH 09/02/2023 31.1     MCHC 09/02/2023 32.8     RDW 09/02/2023 13.0     RDW-SD 09/02/2023 44.8     MPV 09/02/2023 9.7     Platelets 09/02/2023 189     Neutrophil % 09/02/2023 90.1 (H)     Lymphocyte % 09/02/2023 8.3 (L)     Monocyte % 09/02/2023 0.9 (L)     Eosinophil % 09/02/2023 0.0 (L)     Basophil % 09/02/2023 0.2     Immature Grans % 09/02/2023 0.5     Neutrophils, Absolute 09/02/2023 8.97 (H)     Lymphocytes, Absolute 09/02/2023 0.83     Monocytes, Absolute 09/02/2023 0.09 (L)     Eosinophils, Absolute 09/02/2023 0.00     Basophils, Absolute 09/02/2023 0.02     Immature Grans, Absolute 09/02/2023 0.05     nRBC 09/02/2023 0.0     Lactate 09/02/2023 3.4 (C)     Lactate 09/02/2023 2.9 (C)     Lactate 09/02/2023 3.1 (C)     Hemoglobin A1C 09/02/2023 7.00 (H)     Lactate 09/02/2023 3.5 (C)     Glucose 09/02/2023 211 (H)     BUN 09/02/2023 21     Creatinine 09/02/2023 0.82     Sodium 09/02/2023 137     Potassium 09/02/2023 4.3     Chloride 09/02/2023 100     CO2 09/02/2023 26.1     Calcium 09/02/2023 10.0     BUN/Creatinine Ratio 09/02/2023 25.6 (H)     Anion Gap 09/02/2023 10.9     eGFR 09/02/2023 71.5     Glucose 09/02/2023 219 (H)     Lactate 09/02/2023 3.8 (C)     Glucose 09/02/2023 258 (H)     Lactate 09/03/2023 3.3 (C)     Glucose 09/02/2023 179 (H)     Glucose 09/03/2023 155 (H)     Glucose 09/03/2023 168 (H)    Office Visit on 08/29/2023   Component Date Value    Sed Rate 08/29/2023 10     C-Reactive Protein 08/29/2023 0.40     WBC 08/29/2023 8.02     RBC 08/29/2023 4.08     Hemoglobin 08/29/2023 12.7     Hematocrit 08/29/2023 37.9     MCV 08/29/2023 92.9     MCH 08/29/2023 31.1     MCHC 08/29/2023 33.5     RDW 08/29/2023 13.9      RDW-SD 08/29/2023 47.2     MPV 08/29/2023 9.4     Platelets 08/29/2023 197     Neutrophil % 08/29/2023 67.2     Lymphocyte % 08/29/2023 15.3 (L)     Monocyte % 08/29/2023 10.5     Eosinophil % 08/29/2023 6.1     Basophil % 08/29/2023 0.5     Immature Grans % 08/29/2023 0.4     Neutrophils, Absolute 08/29/2023 5.39     Lymphocytes, Absolute 08/29/2023 1.23     Monocytes, Absolute 08/29/2023 0.84     Eosinophils, Absolute 08/29/2023 0.49 (H)     Basophils, Absolute 08/29/2023 0.04     Immature Grans, Absolute 08/29/2023 0.03     nRBC 08/29/2023 0.0    Consult on 08/08/2023   Component Date Value    Glucose 08/08/2023 128 (H)     BUN 08/08/2023 19     Creatinine 08/08/2023 0.86     Sodium 08/08/2023 140     Potassium 08/08/2023 4.0     Chloride 08/08/2023 105     CO2 08/08/2023 25.4     Calcium 08/08/2023 9.9     Total Protein 08/08/2023 7.2     Albumin 08/08/2023 4.0     ALT (SGPT) 08/08/2023 22     AST (SGOT) 08/08/2023 24     Alkaline Phosphatase 08/08/2023 96     Total Bilirubin 08/08/2023 0.4     Globulin 08/08/2023 3.2     A/G Ratio 08/08/2023 1.3     BUN/Creatinine Ratio 08/08/2023 22.1     Anion Gap 08/08/2023 9.6     eGFR 08/08/2023 67.5     LDH 08/08/2023 237 (H)     Sed Rate 08/08/2023 12     C-Reactive Protein 08/08/2023 <0.30     IgG 08/08/2023 875     IgA 08/08/2023 199     IgM 08/08/2023 43     Total Protein 08/08/2023 6.6     Albumin 08/08/2023 3.6     Byvpw-8-Xhgrasbm 08/08/2023 0.3     Vslkh-8-Qioewqjo 08/08/2023 0.8     Beta Globulin 08/08/2023 1.0     Gamma Globulin 08/08/2023 0.9     M-Igor 08/08/2023 Not Observed     Globulin 08/08/2023 3.0     A/G Ratio 08/08/2023 1.3     Immunofixation Reflex, S* 08/08/2023 Comment     Please note 08/08/2023 Comment     Free Light Chain, Kappa 08/08/2023 22.1 (H)     Free Lambda Light Chains 08/08/2023 12.3     Kappa/Lambda Ratio 08/08/2023 1.80 (H)     Rheumatoid Factor Quanti* 08/08/2023 <10.0     Anti-DNA (DS) Ab Qn 08/08/2023 <1     RNP Antibodies  08/08/2023 <0.2     Mo Antibodies 08/08/2023 <0.2     Antiscleroderma-70 Antib* 08/08/2023 2.0 (H)     Sjogren's Anti-SS-A 08/08/2023 <0.2     Sjogren's Anti-SS-B 08/08/2023 <0.2     Antichromatin Antibodies 08/08/2023 <0.2     CHELSEA-1 IgG 08/08/2023 <0.2     Anti-Centromere B Antibo* 08/08/2023 <0.2     See below: 08/08/2023 Comment     Amylase 08/08/2023 66     TSH 08/08/2023 2.030    Lab on 08/08/2023   Component Date Value    WBC 08/08/2023 7.52     RBC 08/08/2023 4.07     Hemoglobin 08/08/2023 12.8     Hematocrit 08/08/2023 37.8     MCV 08/08/2023 92.9     MCH 08/08/2023 31.4     MCHC 08/08/2023 33.9     RDW 08/08/2023 14.4     RDW-SD 08/08/2023 48.9     MPV 08/08/2023 9.8     Platelets 08/08/2023 213     Neutrophil % 08/08/2023 66.1     Lymphocyte % 08/08/2023 20.2     Monocyte % 08/08/2023 8.8     Eosinophil % 08/08/2023 3.9     Basophil % 08/08/2023 0.5     Immature Grans % 08/08/2023 0.5     Neutrophils, Absolute 08/08/2023 4.97     Lymphocytes, Absolute 08/08/2023 1.52     Monocytes, Absolute 08/08/2023 0.66     Eosinophils, Absolute 08/08/2023 0.29     Basophils, Absolute 08/08/2023 0.04     Immature Grans, Absolute 08/08/2023 0.04     nRBC 08/08/2023 0.0        Current Psychotropic Medications    zolpidem (AMBIEN) 5 MG tablet, Take 1 tablet by mouth At Night As Needed for Sleep., Disp: , Rfl:      Plan of Care  - Provided supportive therapy through active listening, empathy, and validation.  - Discussed medications and treatment options. Patient does not want to try any medications at this time and prefers to continue being followed by her primary care provider, Dr. Katarzyna Bland, for overall management of all of her medications.  - Encouraged talk therapy with this provider or a trusted source of support, such as a friend, family member, or community resources, such as someone from [facility].   - See after visit summary for patient instructions and additional resource materials.    Will continue to be  available as needed for additional support.    Diagnoses and all orders for this visit:    1. Grief (Primary)    2. Tremor    - Rule out psychogenic tremors    3. Anxiety     - Rule out panic disorder   - Rule out generalized anxiety disorder        I spent 68 minutes caring for Madisyn on this date of service. This time includes time spent by me in the following activities: preparing for the visit, reviewing tests, obtaining and/or reviewing a separately obtained history, performing a medically appropriate examination and/or evaluation, counseling and educating the patient/family/caregiver, documenting information in the medical record, and care coordination.

## 2023-10-06 PROBLEM — R25.1 TREMOR: Status: ACTIVE | Noted: 2023-10-06

## 2023-10-06 PROBLEM — F43.21 GRIEF: Status: ACTIVE | Noted: 2023-10-06

## 2023-10-06 PROBLEM — F41.9 ANXIETY: Status: ACTIVE | Noted: 2023-10-06

## 2023-10-11 ENCOUNTER — OFFICE (OUTPATIENT)
Dept: URBAN - METROPOLITAN AREA CLINIC 66 | Facility: CLINIC | Age: 83
End: 2023-10-11

## 2023-10-11 VITALS
DIASTOLIC BLOOD PRESSURE: 87 MMHG | HEIGHT: 63 IN | SYSTOLIC BLOOD PRESSURE: 126 MMHG | WEIGHT: 199 LBS | HEART RATE: 89 BPM

## 2023-10-11 DIAGNOSIS — R13.10 DYSPHAGIA, UNSPECIFIED: ICD-10-CM

## 2023-10-11 DIAGNOSIS — Z80.0 FAMILY HISTORY OF MALIGNANT NEOPLASM OF DIGESTIVE ORGANS: ICD-10-CM

## 2023-10-11 DIAGNOSIS — R05.9 COUGH, UNSPECIFIED: ICD-10-CM

## 2023-10-11 DIAGNOSIS — K21.9 GASTRO-ESOPHAGEAL REFLUX DISEASE WITHOUT ESOPHAGITIS: ICD-10-CM

## 2023-10-11 DIAGNOSIS — K59.00 CONSTIPATION, UNSPECIFIED: ICD-10-CM

## 2023-10-11 PROCEDURE — 99214 OFFICE O/P EST MOD 30 MIN: CPT | Performed by: NURSE PRACTITIONER

## 2023-10-11 RX ORDER — DEXLANSOPRAZOLE 60 MG/1
60 CAPSULE, DELAYED RELEASE ORAL
Qty: 90 | Refills: 3 | Status: COMPLETED
Start: 2023-10-11 | End: 2023-12-27

## 2023-10-16 NOTE — TELEPHONE ENCOUNTER
Caller: Madisyn Plascencia    Relationship to patient: Self    Best call back number:     Chief complaint: BILATERAL SHOUDER PAIN     Type of visit: FUP INJECTION       Pt requesting UA on admission. Denied dysuria but endorsed increased frequency. However, pt with hx of OAB. UA with bacteria and 3+ LEs. Cultures with no predominant bacteria.    -- Was on abx for COPD exacerbation

## 2023-11-03 ENCOUNTER — ON CAMPUS - OUTPATIENT (OUTPATIENT)
Dept: URBAN - METROPOLITAN AREA HOSPITAL 114 | Facility: HOSPITAL | Age: 83
End: 2023-11-03

## 2023-11-03 ENCOUNTER — HOSPITAL ENCOUNTER (OUTPATIENT)
Facility: HOSPITAL | Age: 83
Setting detail: HOSPITAL OUTPATIENT SURGERY
Discharge: HOME | End: 2023-11-03
Attending: INTERNAL MEDICINE | Admitting: INTERNAL MEDICINE
Payer: MEDICARE

## 2023-11-03 ENCOUNTER — ANESTHESIA (OUTPATIENT)
Dept: GASTROENTEROLOGY | Facility: HOSPITAL | Age: 83
End: 2023-11-03
Payer: MEDICARE

## 2023-11-03 ENCOUNTER — ANESTHESIA EVENT (OUTPATIENT)
Dept: GASTROENTEROLOGY | Facility: HOSPITAL | Age: 83
End: 2023-11-03
Payer: MEDICARE

## 2023-11-03 VITALS
SYSTOLIC BLOOD PRESSURE: 151 MMHG | WEIGHT: 195 LBS | RESPIRATION RATE: 20 BRPM | BODY MASS INDEX: 34.55 KG/M2 | OXYGEN SATURATION: 95 % | HEIGHT: 63 IN | HEART RATE: 61 BPM | DIASTOLIC BLOOD PRESSURE: 85 MMHG

## 2023-11-03 DIAGNOSIS — R13.10 DYSPHAGIA, UNSPECIFIED: ICD-10-CM

## 2023-11-03 DIAGNOSIS — R13.10 DYSPHAGIA: ICD-10-CM

## 2023-11-03 DIAGNOSIS — K22.2 ESOPHAGEAL OBSTRUCTION: ICD-10-CM

## 2023-11-03 DIAGNOSIS — K29.70 GASTRITIS, UNSPECIFIED, WITHOUT BLEEDING: ICD-10-CM

## 2023-11-03 DIAGNOSIS — K44.9 DIAPHRAGMATIC HERNIA WITHOUT OBSTRUCTION OR GANGRENE: ICD-10-CM

## 2023-11-03 LAB — GLUCOSE BLDC GLUCOMTR-MCNC: 123 MG/DL (ref 70–130)

## 2023-11-03 PROCEDURE — 25010000002 PROPOFOL 10 MG/ML EMULSION: Performed by: REGISTERED NURSE

## 2023-11-03 PROCEDURE — 82948 REAGENT STRIP/BLOOD GLUCOSE: CPT

## 2023-11-03 PROCEDURE — 43450 DILATE ESOPHAGUS 1/MULT PASS: CPT | Performed by: INTERNAL MEDICINE

## 2023-11-03 PROCEDURE — 88305 TISSUE EXAM BY PATHOLOGIST: CPT | Performed by: INTERNAL MEDICINE

## 2023-11-03 PROCEDURE — 43239 EGD BIOPSY SINGLE/MULTIPLE: CPT | Performed by: INTERNAL MEDICINE

## 2023-11-03 PROCEDURE — 25810000003 LACTATED RINGERS PER 1000 ML: Performed by: INTERNAL MEDICINE

## 2023-11-03 RX ORDER — PROPOFOL 10 MG/ML
VIAL (ML) INTRAVENOUS AS NEEDED
Status: DISCONTINUED | OUTPATIENT
Start: 2023-11-03 | End: 2023-11-03 | Stop reason: SURG

## 2023-11-03 RX ORDER — SODIUM CHLORIDE 0.9 % (FLUSH) 0.9 %
10 SYRINGE (ML) INJECTION AS NEEDED
Status: DISCONTINUED | OUTPATIENT
Start: 2023-11-03 | End: 2023-11-03 | Stop reason: HOSPADM

## 2023-11-03 RX ORDER — LIDOCAINE HYDROCHLORIDE 20 MG/ML
INJECTION, SOLUTION INFILTRATION; PERINEURAL AS NEEDED
Status: DISCONTINUED | OUTPATIENT
Start: 2023-11-03 | End: 2023-11-03 | Stop reason: SURG

## 2023-11-03 RX ORDER — SODIUM CHLORIDE, SODIUM LACTATE, POTASSIUM CHLORIDE, CALCIUM CHLORIDE 600; 310; 30; 20 MG/100ML; MG/100ML; MG/100ML; MG/100ML
1000 INJECTION, SOLUTION INTRAVENOUS CONTINUOUS
Status: DISCONTINUED | OUTPATIENT
Start: 2023-11-03 | End: 2023-11-03 | Stop reason: HOSPADM

## 2023-11-03 RX ADMIN — PROPOFOL 60 MG: 10 INJECTION, EMULSION INTRAVENOUS at 10:02

## 2023-11-03 RX ADMIN — SODIUM CHLORIDE, POTASSIUM CHLORIDE, SODIUM LACTATE AND CALCIUM CHLORIDE 1000 ML: 600; 310; 30; 20 INJECTION, SOLUTION INTRAVENOUS at 09:08

## 2023-11-03 RX ADMIN — PROPOFOL 160 MCG/KG/MIN: 10 INJECTION, EMULSION INTRAVENOUS at 10:02

## 2023-11-03 RX ADMIN — LIDOCAINE HYDROCHLORIDE 60 MG: 20 INJECTION, SOLUTION INFILTRATION; PERINEURAL at 10:02

## 2023-11-03 NOTE — H&P
[facility]   HISTORY AND PHYSICAL    Patient Name: Madisyn Plascencia  : 1940  MRN: [id number]  Primary Care Physician:  Katarzyna Bland MD  Date of admission: 11/3/2023    Subjective   Subjective     Chief Complaint: reflux dysphagia     History of Present Illness  Patient with reflux, pulmonary fibrosis.  Some issues with liquids,  esophageal dilation helpful years ago .   Dexilant was suggested but she declines to use it given expense and worries about cdiff ( I did assure her that is risk with all acid reducers. )  Review of Systems   HENT:  Positive for trouble swallowing.    All other systems reviewed and are negative.       Personal History     Past Medical History:   Diagnosis Date    Anxiety     Arthritis     C. difficile colitis     Difficulty swallowing pills     Disease of thyroid gland     Diverticulitis     GERD (gastroesophageal reflux disease)     H/O uterine prolapse     History of esophageal dilatation     Hyperlipidemia     Hypertension     Hypothyroidism     Knee swelling     Had right knee replaced first … had to choose which knee first    Osteopenia     Periarthritis of shoulder 2022    Dr. Hamilton visit    Prediabetes     Recent weight loss     Rotator cuff syndrome 2022    Dr. Hamilton diagnosed on first visit…mild    Scoliosis     Shoulder injury     Tear of meniscus of knee     Had both knees replaced     Tremor 10/06/2023    Type 2 diabetes mellitus with hyperglycemia 2023    Uterine prolapse        Past Surgical History:   Procedure Laterality Date    CHOLECYSTECTOMY      COLONOSCOPY      ENDOSCOPY      ENDOSCOPY N/A 2021    Procedure: ESOPHAGOGASTRODUODENOSCOPY WITH 52 [place] BR[address]ORD DILATION;  Surgeon: Tripp Pascal MD;  Location: [facility] ENDOSCOPY;  Service: Gastroenterology;  Laterality: N/A;  PRE- DYSPHAGIA  POST- ESOPHAGEAL RING    JOINT REPLACEMENT Right     KNEE    TONSILLECTOMY      TOTAL KNEE ARTHROPLASTY Left 2022     Procedure: LEFT TOTAL KNEE ARTHROPLASTY;  Surgeon: Antonio Riggins MD;  Location: [facility] OR [facility] [place];  Service: Orthopedics;  Laterality: Left;       Family History: family history includes Cancer in her mother; Early death in her mother; Heart attack in her father; Heart disease in her father; Migraines in her daughter. Otherwise pertinent FHx was reviewed and not pertinent to current issue.    Social History:  reports that she has never smoked. She has never used smokeless tobacco. She reports that she does not currently use alcohol. She reports that she does not use drugs.    Home Medications:  atorvastatin, celecoxib, levothyroxine, pantoprazole, polyethylene glycol, vitamin C, vitamin D3, and zolpidem    Allergies:  Allergies   Allergen Reactions    Sulfa Antibiotics Itching     Topical       Objective    Objective     Vitals:   Heart Rate:  [75] 75  Resp:  [20] 20  BP: (149)/(90) 149/90    Physical Exam  HENT:      Right Ear: External ear normal.      Left Ear: External ear normal.      Mouth/Throat:      Pharynx: Oropharynx is clear.   Eyes:      Conjunctiva/sclera: Conjunctivae normal.   Cardiovascular:      Rate and Rhythm: Normal rate.      Pulses: Normal pulses.   Pulmonary:      Effort: Pulmonary effort is normal.   Abdominal:      General: Abdomen is flat.   Skin:     General: Skin is warm.   Neurological:      General: No focal deficit present.      Mental Status: She is alert.   Psychiatric:         Mood and Affect: Mood normal.         Result Review    Result Review:  I have personally reviewed the results from the time of this admission to 11/3/2023 10:01 EDT and agree with these findings:  []  Laboratory list / accordion  []  Microbiology  []  Radiology  []  EKG/Telemetry   []  Cardiology/Vascular   []  Pathology  []  Old records  []  Other:  Most notable findings include:       Assessment & Plan   Assessment / Plan     Brief Patient Summary:  Madisyn Plascencia is a 82 y.o. female who    Dypshagia  Gastroesophageal reflux     Active Hospital Problems:  There are no active hospital problems to display for this patient.    Plan: Upper tract endoscopy, risks, alternatives and benefits discussed with patient and patient is agreeable to proceed   Patient declines to used dexilant, will continue pantoprazole am and famotidine pm         DVT prophylaxis:  No DVT prophylaxis order currently exists.    CODE STATUS:       Admission Status:  I believe this patient meets outpatient  status.    Tripp Pascal MD

## 2023-11-03 NOTE — ANESTHESIA PREPROCEDURE EVALUATION
Anesthesia Evaluation     Patient summary reviewed and Nursing notes reviewed   NPO Solid Status: > 8 hours  NPO Liquid Status: > 8 hours           Airway   Mallampati: II  Neck ROM: full  No difficulty expected  Dental - normal exam     Pulmonary     breath sounds clear to auscultation  Cardiovascular     Rhythm: regular    (+) hypertension, hyperlipidemia      Neuro/Psych  (+) tremors, psychiatric history Anxiety  GI/Hepatic/Renal/Endo    (+) obesity, GERD, diabetes mellitus, thyroid problem     Musculoskeletal     Abdominal   (+) obese   Substance History      OB/GYN          Other   arthritis,                 Anesthesia Plan    ASA 3     MAC     intravenous induction     Anesthetic plan, risks, benefits, and alternatives have been provided, discussed and informed consent has been obtained with: patient.    CODE STATUS:

## 2023-11-03 NOTE — ANESTHESIA POSTPROCEDURE EVALUATION
"Patient: Madisyn Plascencia    Procedure Summary       Date: 11/03/23 Room / Location:  [facility] /  [facility]    Anesthesia Start: 0955 Anesthesia Stop: 1017    Procedure: ESOPHAGOGASTRODUODENOSCOPY with biopsy and holden dialation (Esophagus) Diagnosis:     Surgeons: Tripp Pascal MD Provider: Fabian Palencia MD    Anesthesia Type: MAC ASA Status: 3            Anesthesia Type: MAC    Vitals  Vitals Value Taken Time   /91 11/03/23 1042   Temp     Pulse 63 11/03/23 1046   Resp 20 11/03/23 1036   SpO2 96 % 11/03/23 1046   Vitals shown include unfiled device data.        Post Anesthesia Care and Evaluation    Patient location during evaluation: bedside  Patient participation: complete - patient participated  Level of consciousness: sleepy but conscious  Pain score: 0  Pain management: adequate    Airway patency: patent  Anesthetic complications: No anesthetic complications    Cardiovascular status: acceptable  Respiratory status: acceptable  Hydration status: acceptable    Comments: /85   Pulse 61   Resp 20   Ht 160 cm (63\")   Wt 88.5 kg (195 lb)   LMP  (LMP Unknown)   SpO2 95%   BMI 34.54 kg/m²       "

## 2023-11-06 LAB
LAB AP CASE REPORT: NORMAL
PATH REPORT.FINAL DX SPEC: NORMAL
PATH REPORT.GROSS SPEC: NORMAL

## 2023-11-17 ENCOUNTER — APPOINTMENT (OUTPATIENT)
Dept: WOMENS IMAGING | Facility: HOSPITAL | Age: 83
End: 2023-11-17
Payer: MEDICARE

## 2023-11-17 PROCEDURE — 77063 BREAST TOMOSYNTHESIS BI: CPT | Performed by: RADIOLOGY

## 2023-11-17 PROCEDURE — 77067 SCR MAMMO BI INCL CAD: CPT | Performed by: RADIOLOGY

## 2023-12-07 ENCOUNTER — TELEPHONE (OUTPATIENT)
Dept: ORTHOPEDIC SURGERY | Facility: CLINIC | Age: 83
End: 2023-12-07

## 2023-12-07 NOTE — TELEPHONE ENCOUNTER
Caller: Madisyn Plascencia    Relationship to patient: Self    Best call back number: [phone]    Patient is needing:  PATIENT REQUESTING YOHANA SHOULDER INJECTION TODAY WITH DR. MACIAS ASAP . PATIENT SCHEDULED 12/13 BUT SHE IS EXPERIENCING AN EXCRUCIATING PAIN SINCE 10/28 AND SHE IS AWARE INSURANCE MIGHT NOT COVER INJ SINCE LAST ONE WAS 09/13/23.  PATIENT WAS ADVISED BY  THAT SHE CAN PAY CASH AND INSURANCE CAN DO A REIMBURSEMENT. IT IS CURRENTLY AFFECTING HER MOBILITY AND NORMAL ACTIVITIES, PATIENT SEEKING CLINICAL ADVISE, DENIED ED STATING DR. MACIAS IS HER ONLY OPTION.    16

## 2023-12-07 NOTE — TELEPHONE ENCOUNTER
"I called Mrs. Plascencia back and explained we do not have anything any sooner. She said she was having trouble moving her shoulders because of extreme pain. I explained that if she is having debilitating pain she should go to the ER She said\" they will not do anything for me\". She then asked if she could take the hydrocodone she had left over from her knee surgery. I explained I would have to send a message, that I can not advise her on that, I would need to ask Dr. Hamilton or she may call her PCP. She said she will gala her PCP.  "

## 2023-12-08 ENCOUNTER — HOSPITAL ENCOUNTER (OUTPATIENT)
Dept: GENERAL RADIOLOGY | Facility: HOSPITAL | Age: 83
Discharge: HOME | End: 2023-12-08
Payer: MEDICARE

## 2023-12-08 ENCOUNTER — LAB (OUTPATIENT)
Dept: LAB | Facility: HOSPITAL | Age: 83
End: 2023-12-08
Payer: MEDICARE

## 2023-12-08 ENCOUNTER — TRANSCRIBE ORDERS (OUTPATIENT)
Dept: LAB | Facility: HOSPITAL | Age: 83
End: 2023-12-08
Payer: MEDICARE

## 2023-12-08 DIAGNOSIS — R05.9 COUGH, UNSPECIFIED TYPE: ICD-10-CM

## 2023-12-08 DIAGNOSIS — M25.512 BILATERAL SHOULDER PAIN, UNSPECIFIED CHRONICITY: ICD-10-CM

## 2023-12-08 DIAGNOSIS — M25.512 BILATERAL SHOULDER PAIN, UNSPECIFIED CHRONICITY: Primary | ICD-10-CM

## 2023-12-08 DIAGNOSIS — M25.511 BILATERAL SHOULDER PAIN, UNSPECIFIED CHRONICITY: Primary | ICD-10-CM

## 2023-12-08 DIAGNOSIS — M25.511 BILATERAL SHOULDER PAIN, UNSPECIFIED CHRONICITY: ICD-10-CM

## 2023-12-08 LAB
ALBUMIN SERPL-MCNC: 4.4 G/DL (ref 3.5–5.2)
ALBUMIN/GLOB SERPL: 1.8 G/DL
ALP SERPL-CCNC: 96 U/L (ref 39–117)
ALT SERPL W P-5'-P-CCNC: 27 U/L (ref 1–33)
ANION GAP SERPL CALCULATED.3IONS-SCNC: 10.7 MMOL/L (ref 5–15)
AST SERPL-CCNC: 29 U/L (ref 1–32)
B PARAPERT DNA SPEC QL NAA+PROBE: NOT DETECTED
B PERT DNA SPEC QL NAA+PROBE: NOT DETECTED
BASOPHILS # BLD AUTO: 0.03 10*3/MM3 (ref 0–0.2)
BASOPHILS NFR BLD AUTO: 0.4 % (ref 0–1.5)
BILIRUB SERPL-MCNC: 0.4 MG/DL (ref 0–1.2)
BUN SERPL-MCNC: 19 MG/DL (ref 8–23)
BUN/CREAT SERPL: 20 (ref 7–25)
C PNEUM DNA NPH QL NAA+NON-PROBE: NOT DETECTED
CALCIUM SPEC-SCNC: 9.9 MG/DL (ref 8.6–10.5)
CHLORIDE SERPL-SCNC: 105 MMOL/L (ref 98–107)
CO2 SERPL-SCNC: 23.3 MMOL/L (ref 22–29)
CREAT SERPL-MCNC: 0.95 MG/DL (ref 0.57–1)
CRP SERPL-MCNC: 0.8 MG/DL (ref 0–0.5)
DEPRECATED RDW RBC AUTO: 43.8 FL (ref 37–54)
EGFRCR SERPLBLD CKD-EPI 2021: 59.6 ML/MIN/1.73
EOSINOPHIL # BLD AUTO: 0.1 10*3/MM3 (ref 0–0.4)
EOSINOPHIL NFR BLD AUTO: 1.4 % (ref 0.3–6.2)
ERYTHROCYTE [DISTWIDTH] IN BLOOD BY AUTOMATED COUNT: 13 % (ref 12.3–15.4)
ERYTHROCYTE [SEDIMENTATION RATE] IN BLOOD: 9 MM/HR (ref 0–30)
FLUAV SUBTYP SPEC NAA+PROBE: NOT DETECTED
FLUBV RNA ISLT QL NAA+PROBE: NOT DETECTED
GLOBULIN UR ELPH-MCNC: 2.4 GM/DL
GLUCOSE SERPL-MCNC: 119 MG/DL (ref 65–99)
HADV DNA SPEC NAA+PROBE: NOT DETECTED
HCOV 229E RNA SPEC QL NAA+PROBE: NOT DETECTED
HCOV HKU1 RNA SPEC QL NAA+PROBE: NOT DETECTED
HCOV NL63 RNA SPEC QL NAA+PROBE: NOT DETECTED
HCOV OC43 RNA SPEC QL NAA+PROBE: NOT DETECTED
HCT VFR BLD AUTO: 38.8 % (ref 34–46.6)
HGB BLD-MCNC: 12.8 G/DL (ref 12–15.9)
HMPV RNA NPH QL NAA+NON-PROBE: NOT DETECTED
HPIV1 RNA ISLT QL NAA+PROBE: NOT DETECTED
HPIV2 RNA SPEC QL NAA+PROBE: NOT DETECTED
HPIV3 RNA NPH QL NAA+PROBE: NOT DETECTED
HPIV4 P GENE NPH QL NAA+PROBE: NOT DETECTED
IMM GRANULOCYTES # BLD AUTO: 0.01 10*3/MM3 (ref 0–0.05)
IMM GRANULOCYTES NFR BLD AUTO: 0.1 % (ref 0–0.5)
LYMPHOCYTES # BLD AUTO: 0.99 10*3/MM3 (ref 0.7–3.1)
LYMPHOCYTES NFR BLD AUTO: 13.4 % (ref 19.6–45.3)
M PNEUMO IGG SER IA-ACNC: NOT DETECTED
MCH RBC QN AUTO: 30.4 PG (ref 26.6–33)
MCHC RBC AUTO-ENTMCNC: 33 G/DL (ref 31.5–35.7)
MCV RBC AUTO: 92.2 FL (ref 79–97)
MONOCYTES # BLD AUTO: 1.18 10*3/MM3 (ref 0.1–0.9)
MONOCYTES NFR BLD AUTO: 16 % (ref 5–12)
NEUTROPHILS NFR BLD AUTO: 5.06 10*3/MM3 (ref 1.7–7)
NEUTROPHILS NFR BLD AUTO: 68.7 % (ref 42.7–76)
NRBC BLD AUTO-RTO: 0 /100 WBC (ref 0–0.2)
PLATELET # BLD AUTO: 193 10*3/MM3 (ref 140–450)
PMV BLD AUTO: 9.7 FL (ref 6–12)
POTASSIUM SERPL-SCNC: 4.2 MMOL/L (ref 3.5–5.2)
PROT SERPL-MCNC: 6.8 G/DL (ref 6–8.5)
RBC # BLD AUTO: 4.21 10*6/MM3 (ref 3.77–5.28)
RHINOVIRUS RNA SPEC NAA+PROBE: NOT DETECTED
RSV RNA NPH QL NAA+NON-PROBE: NOT DETECTED
SARS-COV-2 RNA NPH QL NAA+NON-PROBE: DETECTED
SODIUM SERPL-SCNC: 139 MMOL/L (ref 136–145)
WBC NRBC COR # BLD AUTO: 7.37 10*3/MM3 (ref 3.4–10.8)

## 2023-12-08 PROCEDURE — 85025 COMPLETE CBC W/AUTO DIFF WBC: CPT

## 2023-12-08 PROCEDURE — 86140 C-REACTIVE PROTEIN: CPT

## 2023-12-08 PROCEDURE — 85652 RBC SED RATE AUTOMATED: CPT

## 2023-12-08 PROCEDURE — 71046 X-RAY EXAM CHEST 2 VIEWS: CPT

## 2023-12-08 PROCEDURE — 80053 COMPREHEN METABOLIC PANEL: CPT

## 2023-12-08 PROCEDURE — 0202U NFCT DS 22 TRGT SARS-COV-2: CPT

## 2023-12-08 PROCEDURE — 36415 COLL VENOUS BLD VENIPUNCTURE: CPT

## 2023-12-18 ENCOUNTER — OFFICE VISIT (OUTPATIENT)
Dept: ORTHOPEDIC SURGERY | Facility: CLINIC | Age: 83
End: 2023-12-18
Payer: MEDICARE

## 2023-12-18 VITALS — TEMPERATURE: 98 F | HEIGHT: 63 IN | BODY MASS INDEX: 34.62 KG/M2 | WEIGHT: 195.4 LBS

## 2023-12-18 DIAGNOSIS — M25.512 CHRONIC LEFT SHOULDER PAIN: ICD-10-CM

## 2023-12-18 DIAGNOSIS — M25.511 CHRONIC RIGHT SHOULDER PAIN: Primary | ICD-10-CM

## 2023-12-18 DIAGNOSIS — G89.29 CHRONIC RIGHT SHOULDER PAIN: Primary | ICD-10-CM

## 2023-12-18 DIAGNOSIS — G89.29 CHRONIC LEFT SHOULDER PAIN: ICD-10-CM

## 2023-12-18 RX ORDER — METHYLPREDNISOLONE ACETATE 80 MG/ML
80 INJECTION, SUSPENSION INTRA-ARTICULAR; INTRALESIONAL; INTRAMUSCULAR; SOFT TISSUE
Status: COMPLETED | OUTPATIENT
Start: 2023-12-18 | End: 2023-12-18

## 2023-12-18 RX ORDER — LIDOCAINE HYDROCHLORIDE 10 MG/ML
2 INJECTION, SOLUTION EPIDURAL; INFILTRATION; INTRACAUDAL; PERINEURAL
Status: COMPLETED | OUTPATIENT
Start: 2023-12-18 | End: 2023-12-18

## 2023-12-18 RX ADMIN — LIDOCAINE HYDROCHLORIDE 2 ML: 10 INJECTION, SOLUTION EPIDURAL; INFILTRATION; INTRACAUDAL; PERINEURAL at 16:32

## 2023-12-18 RX ADMIN — METHYLPREDNISOLONE ACETATE 80 MG: 80 INJECTION, SUSPENSION INTRA-ARTICULAR; INTRALESIONAL; INTRAMUSCULAR; SOFT TISSUE at 16:32

## 2023-12-18 NOTE — PROGRESS NOTES
Ms. Plascencia is seen today in follow-up for both shoulders.  No new complaints or issues.  She would like the injections repeated.  The risk, benefits and alternatives were discussed.  She consented and the injection was performed as described below.      Large Joint Arthrocentesis: R subacromial bursa  Date/Time: 12/18/2023 4:32 PM  Consent given by: patient  Site marked: site marked  Timeout: Immediately prior to procedure a time out was called to verify the correct patient, procedure, equipment, support staff and site/side marked as required   Supporting Documentation  Indications: pain   Procedure Details  Location: shoulder - R subacromial bursa  Preparation: Patient was prepped and draped in the usual sterile fashion  Needle gauge: 21 G.  Approach: posterior  Medications administered: 2 mL lidocaine PF 1% 1 %; 80 mg methylPREDNISolone acetate 80 MG/ML  Patient tolerance: patient tolerated the procedure well with no immediate complications      Large Joint Arthrocentesis: L subacromial bursa  Date/Time: 12/18/2023 4:32 PM  Consent given by: patient  Site marked: site marked  Timeout: Immediately prior to procedure a time out was called to verify the correct patient, procedure, equipment, support staff and site/side marked as required   Supporting Documentation  Indications: pain   Procedure Details  Location: shoulder - L subacromial bursa  Preparation: Patient was prepped and draped in the usual sterile fashion  Needle gauge: 21 G.  Approach: posterior  Medications administered: 2 mL lidocaine PF 1% 1 %; 80 mg methylPREDNISolone acetate 80 MG/ML  Patient tolerance: patient tolerated the procedure well with no immediate complications

## 2023-12-27 ENCOUNTER — OFFICE (OUTPATIENT)
Dept: URBAN - METROPOLITAN AREA CLINIC 66 | Facility: CLINIC | Age: 83
End: 2023-12-27

## 2023-12-27 VITALS
WEIGHT: 196 LBS | DIASTOLIC BLOOD PRESSURE: 78 MMHG | HEIGHT: 63 IN | SYSTOLIC BLOOD PRESSURE: 170 MMHG | HEART RATE: 81 BPM

## 2023-12-27 DIAGNOSIS — F45.8 OTHER SOMATOFORM DISORDERS: ICD-10-CM

## 2023-12-27 DIAGNOSIS — K21.9 GASTRO-ESOPHAGEAL REFLUX DISEASE WITHOUT ESOPHAGITIS: ICD-10-CM

## 2023-12-27 PROCEDURE — 99213 OFFICE O/P EST LOW 20 MIN: CPT | Performed by: NURSE PRACTITIONER

## 2024-01-04 ENCOUNTER — TELEPHONE (OUTPATIENT)
Dept: ORTHOPEDIC SURGERY | Facility: CLINIC | Age: 84
End: 2024-01-04
Payer: MEDICARE

## 2024-01-04 NOTE — TELEPHONE ENCOUNTER
I spoke to Ms. Plascencia.  She reports the injections did not last this time.  She is having significant pain.  We discussed conservative treatment options including ice/heat, OTC Tylenol, OTC Voltaren gel, and activity modifications.  She reports the Tylenol and Voltaren gel never helped when she had problems with her knees.  She is asking what is the condition with her shoulders.  I explained Dr. Hamilton suspects she has rotator cuff tears based on exam.  Based on his previous note and interpretation of x-rays, she does not seem to have evidence for significant arthritis.  I explained a MRI would evaluate the rotator cuff in detail and could be used for presurgical planning.  She says she is not interested in pursuing surgery due to her age.  Additionally, she says she is claustrophobic and cannot tolerate MRIs.  I encouraged her to give the Tylenol and Voltaren gel a try.  The risks and dosing instructions for this medication were discussed.  She agreed.  Going forward, she will follow-up as needed.

## 2024-01-04 NOTE — TELEPHONE ENCOUNTER
Caller: Madisyn Plascencia    Relationship: Self    Best call back number:     What is the best time to reach you: ANY    Who are you requesting to speak with (clinical staff, provider,  specific staff member): CLINICAL    What was the call regarding: PATIENT DIDN'T GET MUCH RELIEF FROM INJECTION HER SHOULDERS ARE STILL VERY SORE.  WONDERING IF THERES ANYTHING ELSE WE CAN DO     Is it okay if the provider responds through [facility]: PLEASE CALL

## 2024-01-18 ENCOUNTER — LAB (OUTPATIENT)
Dept: LAB | Facility: HOSPITAL | Age: 84
End: 2024-01-18
Payer: MEDICARE

## 2024-01-18 ENCOUNTER — TRANSCRIBE ORDERS (OUTPATIENT)
Dept: ADMINISTRATIVE | Facility: HOSPITAL | Age: 84
End: 2024-01-18
Payer: MEDICARE

## 2024-01-18 DIAGNOSIS — R73.03 DIABETES MELLITUS, LATENT: ICD-10-CM

## 2024-01-18 DIAGNOSIS — E03.9 ACQUIRED HYPOTHYROIDISM: ICD-10-CM

## 2024-01-18 DIAGNOSIS — R51.9 FACIAL PAIN: ICD-10-CM

## 2024-01-18 DIAGNOSIS — R73.03 DIABETES MELLITUS, LATENT: Primary | ICD-10-CM

## 2024-01-18 DIAGNOSIS — R53.1 ASTHENIA: ICD-10-CM

## 2024-01-18 LAB
ALBUMIN SERPL-MCNC: 4.2 G/DL (ref 3.5–5.2)
ALBUMIN/GLOB SERPL: 1.7 G/DL
ALP SERPL-CCNC: 95 U/L (ref 39–117)
ALT SERPL W P-5'-P-CCNC: 24 U/L (ref 1–33)
ANION GAP SERPL CALCULATED.3IONS-SCNC: 10.1 MMOL/L (ref 5–15)
AST SERPL-CCNC: 23 U/L (ref 1–32)
BASOPHILS # BLD AUTO: 0.02 10*3/MM3 (ref 0–0.2)
BASOPHILS NFR BLD AUTO: 0.3 % (ref 0–1.5)
BILIRUB SERPL-MCNC: 0.3 MG/DL (ref 0–1.2)
BUN SERPL-MCNC: 24 MG/DL (ref 8–23)
BUN/CREAT SERPL: 28.6 (ref 7–25)
CALCIUM SPEC-SCNC: 9.5 MG/DL (ref 8.6–10.5)
CHLORIDE SERPL-SCNC: 104 MMOL/L (ref 98–107)
CO2 SERPL-SCNC: 23.9 MMOL/L (ref 22–29)
CORTIS SERPL-MCNC: 5.9 MCG/DL
CREAT SERPL-MCNC: 0.84 MG/DL (ref 0.57–1)
CRP SERPL-MCNC: 0.81 MG/DL (ref 0–0.5)
DEPRECATED RDW RBC AUTO: 43.8 FL (ref 37–54)
EGFRCR SERPLBLD CKD-EPI 2021: 69 ML/MIN/1.73
EOSINOPHIL # BLD AUTO: 0.21 10*3/MM3 (ref 0–0.4)
EOSINOPHIL NFR BLD AUTO: 2.8 % (ref 0.3–6.2)
ERYTHROCYTE [DISTWIDTH] IN BLOOD BY AUTOMATED COUNT: 13 % (ref 12.3–15.4)
ERYTHROCYTE [SEDIMENTATION RATE] IN BLOOD: 5 MM/HR (ref 0–30)
GLOBULIN UR ELPH-MCNC: 2.5 GM/DL
GLUCOSE SERPL-MCNC: 97 MG/DL (ref 65–99)
HBA1C MFR BLD: 6.5 % (ref 4.8–5.6)
HCT VFR BLD AUTO: 39 % (ref 34–46.6)
HGB BLD-MCNC: 12.5 G/DL (ref 12–15.9)
IMM GRANULOCYTES # BLD AUTO: 0.02 10*3/MM3 (ref 0–0.05)
IMM GRANULOCYTES NFR BLD AUTO: 0.3 % (ref 0–0.5)
LYMPHOCYTES # BLD AUTO: 1.29 10*3/MM3 (ref 0.7–3.1)
LYMPHOCYTES NFR BLD AUTO: 17 % (ref 19.6–45.3)
MCH RBC QN AUTO: 29.8 PG (ref 26.6–33)
MCHC RBC AUTO-ENTMCNC: 32.1 G/DL (ref 31.5–35.7)
MCV RBC AUTO: 92.9 FL (ref 79–97)
MONOCYTES # BLD AUTO: 0.61 10*3/MM3 (ref 0.1–0.9)
MONOCYTES NFR BLD AUTO: 8.1 % (ref 5–12)
NEUTROPHILS NFR BLD AUTO: 5.42 10*3/MM3 (ref 1.7–7)
NEUTROPHILS NFR BLD AUTO: 71.5 % (ref 42.7–76)
NRBC BLD AUTO-RTO: 0 /100 WBC (ref 0–0.2)
PLATELET # BLD AUTO: 248 10*3/MM3 (ref 140–450)
PMV BLD AUTO: 9.3 FL (ref 6–12)
POTASSIUM SERPL-SCNC: 4.4 MMOL/L (ref 3.5–5.2)
PROT SERPL-MCNC: 6.7 G/DL (ref 6–8.5)
RBC # BLD AUTO: 4.2 10*6/MM3 (ref 3.77–5.28)
SODIUM SERPL-SCNC: 138 MMOL/L (ref 136–145)
T4 FREE SERPL-MCNC: 1.46 NG/DL (ref 0.93–1.7)
TSH SERPL DL<=0.05 MIU/L-ACNC: 1.86 UIU/ML (ref 0.27–4.2)
WBC NRBC COR # BLD AUTO: 7.57 10*3/MM3 (ref 3.4–10.8)

## 2024-01-18 PROCEDURE — 85025 COMPLETE CBC W/AUTO DIFF WBC: CPT

## 2024-01-18 PROCEDURE — 80053 COMPREHEN METABOLIC PANEL: CPT

## 2024-01-18 PROCEDURE — 36415 COLL VENOUS BLD VENIPUNCTURE: CPT

## 2024-01-18 PROCEDURE — 84443 ASSAY THYROID STIM HORMONE: CPT

## 2024-01-18 PROCEDURE — 83036 HEMOGLOBIN GLYCOSYLATED A1C: CPT

## 2024-01-18 PROCEDURE — 82533 TOTAL CORTISOL: CPT

## 2024-01-18 PROCEDURE — 85652 RBC SED RATE AUTOMATED: CPT

## 2024-01-18 PROCEDURE — 86140 C-REACTIVE PROTEIN: CPT

## 2024-01-18 PROCEDURE — 84439 ASSAY OF FREE THYROXINE: CPT

## 2024-02-23 ENCOUNTER — TRANSCRIBE ORDERS (OUTPATIENT)
Dept: ADMINISTRATIVE | Facility: HOSPITAL | Age: 84
End: 2024-02-23
Payer: MEDICARE

## 2024-02-23 ENCOUNTER — LAB (OUTPATIENT)
Dept: LAB | Facility: HOSPITAL | Age: 84
End: 2024-02-23
Payer: MEDICARE

## 2024-02-23 DIAGNOSIS — E11.9 DIABETES MELLITUS WITHOUT COMPLICATION: ICD-10-CM

## 2024-02-23 DIAGNOSIS — E55.9 VITAMIN D DEFICIENCY DISEASE: ICD-10-CM

## 2024-02-23 DIAGNOSIS — E78.5 HYPERLIPIDEMIA, UNSPECIFIED HYPERLIPIDEMIA TYPE: ICD-10-CM

## 2024-02-23 DIAGNOSIS — R51.9 FACIAL PAIN: ICD-10-CM

## 2024-02-23 DIAGNOSIS — E03.9 ACQUIRED HYPOTHYROIDISM: ICD-10-CM

## 2024-02-23 DIAGNOSIS — E03.9 ACQUIRED HYPOTHYROIDISM: Primary | ICD-10-CM

## 2024-02-23 LAB
25(OH)D3 SERPL-MCNC: 56.7 NG/ML (ref 30–100)
ALBUMIN SERPL-MCNC: 4.4 G/DL (ref 3.5–5.2)
ALBUMIN UR-MCNC: 1.3 MG/DL
ALBUMIN/GLOB SERPL: 1.8 G/DL
ALP SERPL-CCNC: 101 U/L (ref 39–117)
ALT SERPL W P-5'-P-CCNC: 22 U/L (ref 1–33)
ANION GAP SERPL CALCULATED.3IONS-SCNC: 12 MMOL/L (ref 5–15)
AST SERPL-CCNC: 20 U/L (ref 1–32)
BASOPHILS # BLD AUTO: 0.01 10*3/MM3 (ref 0–0.2)
BASOPHILS NFR BLD AUTO: 0.1 % (ref 0–1.5)
BILIRUB SERPL-MCNC: 0.3 MG/DL (ref 0–1.2)
BILIRUB UR QL STRIP: NEGATIVE
BUN SERPL-MCNC: 31 MG/DL (ref 8–23)
BUN/CREAT SERPL: 35.2 (ref 7–25)
CALCIUM SPEC-SCNC: 9.9 MG/DL (ref 8.6–10.5)
CHLORIDE SERPL-SCNC: 105 MMOL/L (ref 98–107)
CHOLEST SERPL-MCNC: 142 MG/DL (ref 0–200)
CLARITY UR: CLEAR
CO2 SERPL-SCNC: 21 MMOL/L (ref 22–29)
COLOR UR: YELLOW
CREAT SERPL-MCNC: 0.88 MG/DL (ref 0.57–1)
CREAT UR-MCNC: 165.8 MG/DL
CRP SERPL-MCNC: <0.3 MG/DL (ref 0–0.5)
DEPRECATED RDW RBC AUTO: 44.3 FL (ref 37–54)
EGFRCR SERPLBLD CKD-EPI 2021: 65.3 ML/MIN/1.73
EOSINOPHIL # BLD AUTO: 0.01 10*3/MM3 (ref 0–0.4)
EOSINOPHIL NFR BLD AUTO: 0.1 % (ref 0.3–6.2)
ERYTHROCYTE [DISTWIDTH] IN BLOOD BY AUTOMATED COUNT: 13.3 % (ref 12.3–15.4)
ERYTHROCYTE [SEDIMENTATION RATE] IN BLOOD: 7 MM/HR (ref 0–30)
FOLATE SERPL-MCNC: 7.95 NG/ML (ref 4.78–24.2)
GLOBULIN UR ELPH-MCNC: 2.4 GM/DL
GLUCOSE SERPL-MCNC: 102 MG/DL (ref 65–99)
GLUCOSE UR STRIP-MCNC: NEGATIVE MG/DL
HBA1C MFR BLD: 6.3 % (ref 4.8–5.6)
HCT VFR BLD AUTO: 37.2 % (ref 34–46.6)
HDLC SERPL-MCNC: 68 MG/DL (ref 40–60)
HGB BLD-MCNC: 12.1 G/DL (ref 12–15.9)
HGB UR QL STRIP.AUTO: NEGATIVE
HOLD SPECIMEN: NORMAL
IMM GRANULOCYTES # BLD AUTO: 0.06 10*3/MM3 (ref 0–0.05)
IMM GRANULOCYTES NFR BLD AUTO: 0.5 % (ref 0–0.5)
KETONES UR QL STRIP: NEGATIVE
LDLC SERPL CALC-MCNC: 57 MG/DL (ref 0–100)
LDLC/HDLC SERPL: 0.82 {RATIO}
LEUKOCYTE ESTERASE UR QL STRIP.AUTO: NEGATIVE
LYMPHOCYTES # BLD AUTO: 1.14 10*3/MM3 (ref 0.7–3.1)
LYMPHOCYTES NFR BLD AUTO: 9.2 % (ref 19.6–45.3)
MCH RBC QN AUTO: 29.7 PG (ref 26.6–33)
MCHC RBC AUTO-ENTMCNC: 32.5 G/DL (ref 31.5–35.7)
MCV RBC AUTO: 91.2 FL (ref 79–97)
MICROALBUMIN/CREAT UR: 7.8 MG/G (ref 0–29)
MONOCYTES # BLD AUTO: 0.64 10*3/MM3 (ref 0.1–0.9)
MONOCYTES NFR BLD AUTO: 5.2 % (ref 5–12)
NEUTROPHILS NFR BLD AUTO: 10.48 10*3/MM3 (ref 1.7–7)
NEUTROPHILS NFR BLD AUTO: 84.9 % (ref 42.7–76)
NITRITE UR QL STRIP: NEGATIVE
NRBC BLD AUTO-RTO: 0 /100 WBC (ref 0–0.2)
PH UR STRIP.AUTO: 5.5 [PH] (ref 5–8)
PLATELET # BLD AUTO: 236 10*3/MM3 (ref 140–450)
PMV BLD AUTO: 9.8 FL (ref 6–12)
POTASSIUM SERPL-SCNC: 4.3 MMOL/L (ref 3.5–5.2)
PROT SERPL-MCNC: 6.8 G/DL (ref 6–8.5)
PROT UR QL STRIP: NEGATIVE
RBC # BLD AUTO: 4.08 10*6/MM3 (ref 3.77–5.28)
SODIUM SERPL-SCNC: 138 MMOL/L (ref 136–145)
SP GR UR STRIP: 1.03 (ref 1–1.03)
T4 FREE SERPL-MCNC: 1.33 NG/DL (ref 0.93–1.7)
TRIGL SERPL-MCNC: 90 MG/DL (ref 0–150)
TSH SERPL DL<=0.05 MIU/L-ACNC: 0.91 UIU/ML (ref 0.27–4.2)
UROBILINOGEN UR QL STRIP: NORMAL
VIT B12 BLD-MCNC: 745 PG/ML (ref 211–946)
VLDLC SERPL-MCNC: 17 MG/DL (ref 5–40)
WBC NRBC COR # BLD AUTO: 12.34 10*3/MM3 (ref 3.4–10.8)

## 2024-02-23 PROCEDURE — 82607 VITAMIN B-12: CPT

## 2024-02-23 PROCEDURE — 81003 URINALYSIS AUTO W/O SCOPE: CPT

## 2024-02-23 PROCEDURE — 82746 ASSAY OF FOLIC ACID SERUM: CPT

## 2024-02-23 PROCEDURE — 80053 COMPREHEN METABOLIC PANEL: CPT

## 2024-02-23 PROCEDURE — 82306 VITAMIN D 25 HYDROXY: CPT

## 2024-02-23 PROCEDURE — 84443 ASSAY THYROID STIM HORMONE: CPT

## 2024-02-23 PROCEDURE — 85025 COMPLETE CBC W/AUTO DIFF WBC: CPT

## 2024-02-23 PROCEDURE — 83036 HEMOGLOBIN GLYCOSYLATED A1C: CPT

## 2024-02-23 PROCEDURE — 85652 RBC SED RATE AUTOMATED: CPT

## 2024-02-23 PROCEDURE — 80061 LIPID PANEL: CPT

## 2024-02-23 PROCEDURE — 84439 ASSAY OF FREE THYROXINE: CPT

## 2024-02-23 PROCEDURE — 82570 ASSAY OF URINE CREATININE: CPT

## 2024-02-23 PROCEDURE — 36415 COLL VENOUS BLD VENIPUNCTURE: CPT

## 2024-02-23 PROCEDURE — 82043 UR ALBUMIN QUANTITATIVE: CPT

## 2024-02-23 PROCEDURE — 86140 C-REACTIVE PROTEIN: CPT

## 2024-03-04 ENCOUNTER — TRANSCRIBE ORDERS (OUTPATIENT)
Dept: ADMINISTRATIVE | Facility: HOSPITAL | Age: 84
End: 2024-03-04
Payer: MEDICARE

## 2024-03-04 DIAGNOSIS — K11.8 MASS OF RIGHT PAROTID GLAND: ICD-10-CM

## 2024-03-04 DIAGNOSIS — M81.0 OSTEOPOROSIS, UNSPECIFIED OSTEOPOROSIS TYPE, UNSPECIFIED PATHOLOGICAL FRACTURE PRESENCE: Primary | ICD-10-CM

## 2024-03-14 ENCOUNTER — TRANSCRIBE ORDERS (OUTPATIENT)
Dept: DIABETES SERVICES | Facility: HOSPITAL | Age: 84
End: 2024-03-14
Payer: MEDICARE

## 2024-03-14 DIAGNOSIS — E11.9 DIABETES MELLITUS WITHOUT COMPLICATION: Primary | ICD-10-CM

## 2024-03-21 ENCOUNTER — HOSPITAL ENCOUNTER (OUTPATIENT)
Dept: DIABETES SERVICES | Facility: HOSPITAL | Age: 84
Discharge: HOME | End: 2024-03-21
Payer: MEDICARE

## 2024-03-21 PROCEDURE — 97802 MEDICAL NUTRITION INDIV IN: CPT

## 2024-03-21 NOTE — PROGRESS NOTES
Diabetes Education    Patient Name:  Madisyn Plascencia  YOB: 1940  MRN: [id number]  Admit Date:  3/21/2024      Ms. Plascencia met with CHRIS GONCALVES for MNT.  A comprehensive assessment/training record has been sent to medical records (see media tab) to associate with this encounter.       We discussed meal planning - she was advised to follow “MyPlate” method to plan meals. We discussed the importance of eating regular meals. We reviewed how to read food labels.  We reviewed portion sizes - we discussed importance of “balance meals/snacks”.  Patient verbalized understanding of all information reviewed at today’s visit.       Ms. Plascencia has been encouraged to call our office with questions or additional education needs. Please place referral for additional services or follow-up should need arise.    Thank you for the referral      Erin Webber RD, STEPHANI, IVANNA      Electronically signed by:  Erin Webber RD  03/21/24 14:35 EDT

## 2024-03-25 ENCOUNTER — TELEPHONE (OUTPATIENT)
Dept: ONCOLOGY | Facility: CLINIC | Age: 84
End: 2024-03-25
Payer: MEDICARE

## 2024-03-25 NOTE — TELEPHONE ENCOUNTER
Caller: Madisyn Plascencia    Relationship: Self    Best call back number: [phone]    What is the best time to reach you: ANYTIME    Who are you requesting to speak with (clinical staff, provider,  specific staff member): SCHEDULING     What was the call regarding: PT'S PCP HAS SCHEDULED HER FOR A CT SCAN FOR THE TUMOR ON HER NECK FOR 5-15, SHE IS WANTING TO SEE IF DR GIORDANO CAN HAVE IT MOVED UP BEFORE APPT ON 4-2  PLEASE ADVISE

## 2024-03-25 NOTE — TELEPHONE ENCOUNTER
Pt called to see if we could move up scans ordered by Dr. Bland's office. Advised pt that since we did not order the scans, we would not be able to move them up. Advised her to contact Dr. Bland's office to see if scans can be moved up. She v/u. She will contact this RN if scans cannot be moved up.

## 2024-03-31 ENCOUNTER — HOSPITAL ENCOUNTER (OUTPATIENT)
Facility: HOSPITAL | Age: 84
Discharge: HOME | End: 2024-03-31
Admitting: INTERNAL MEDICINE
Payer: MEDICARE

## 2024-03-31 DIAGNOSIS — K11.8 MASS OF RIGHT PAROTID GLAND: ICD-10-CM

## 2024-03-31 PROCEDURE — 70490 CT SOFT TISSUE NECK W/O DYE: CPT

## 2024-04-01 NOTE — PROGRESS NOTES
REASON FOR CONSULTATION: Potential lymphoma involving parotid gland                               REQUESTING PHYSICIAN: Katarzyna Bland MD, Alek Brunson MD    RECORDS OBTAINED:  Records of the patients history including those obtained from the referring provider were reviewed and summarized in detail.    HISTORY OF PRESENT ILLNESS:  The patient is a 83 y.o. year old female who is here for an opinion about the above issue.    History of Present Illness     The patient is now an 83-year-old female followed by primary care for the below medical disorders who had undergone studies recently including February 20, 2023 when she had increasing shortness of breath with a CTA that failed to show any evidence of pulmonary embolism.  Additional duplex lower extremities included left popliteal fossa, normal bilateral lower extremity studies and, with a history of idiopathic pulmonary fibrosis a high-resolution chest CT 4/11/2023 showed bibasilar atelectasis, curvilinear opacity in the right lower lobe and subpleural reticular nodule opacities, calcified and noncalcified micronodules without substantial change from previous.    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  There were mildly prominent nodes throughout the neck but mostly within the right level 2 and right level 3 regions?  Metastatic adenopathy.      These findings led to an ultrasound-guided lymph node biopsy 6/13/2023 that was felt to be an atypical lymphoid infiltrate that, upon pathologic review by [facility] labs, was concerning for a T-cell malignancy such that the entire mass was was felt to require removal.  Immunohistochemical stains are essentially  "negative but significant for CD3 and CD5, interspersed CD20, Bcl-2 positive BCx and cyclin D1 negative.    n additional biopsy obtained now core sampling reveals lymphoid tissue with interfollicular expansion by an atypical mixed lymphohistiocytic and eosinophilic infiltrate with a background of reactive lymphoid hyperplasia.      Please note both the biopsies suggest excisional biopsy.      Concurrently she has considerable orthopedic issues including suspected bilateral rotator cuff tears assessed in April treated with injection therapy 6/7/2023 through orthopedics.       We are asked to see the patient for her potential lymphoma.  She indicates that she has been having symptoms for several months at least from December of general weakness and fatigue as her analysis has been continuing.  Please see review of systems below.  She had noted over the 2 to 3-month period of development of right parotid gland swelling nonpainful and not progressing clearly in size more recently but clearly noticeable over the 2 to 3 months.  As result of the biopsy the area has \"changed\".  She has not had fever, chills, night sweats but clearly reduction in performance status.  She indicates that some of this was affected by development of COVID from which she thought she had recovered.    Additional family history include the parotid gland enlargement-benign-and her daughter who attends with her in office visit 8/8/2023 in the history and her son of high-grade lymphoma diagnosed 1985 treated with apparent [facility] chemotherapy.  He has not had a relapse.    The patient underwent studies including normal CBC, CMP, , sed rate of 12, CRP of less than 0.3, paraprotein analysis with no monoclonal spike, free kappa light chain 22.1, free lambda light chain 12.3 and ratio of 1.80, RF less than 10, TIGRE comprehensive antiscleroderma-70 antibody of 2.0, amylase of 66, TSH of 2.30 and CT of neck, chest, abdomen and pelvis.  CTs demonstrate a " mass in the posterior inferior tip of the superficial lobe of the right parotid gland is unchanged to equivocally larger 1 2 mm from 5/12/2023 measuring 2.3 x 1.7 x 2.5 compared to 2.1 x 1.6 x 2.5 obtained 5/12/2023.  There is a stable 5 mm lymph node just inferior to the posterior inferior tip of the superficial lobe of the right parotid gland and a 12 x 11 mm lymph node in superior hyoid right jugulodigastric chain thought to be reactive, mild asymmetric prominence right right inguinal tonsils compared to the left, stable cervical spondylosis with posterior posterior disc osteophyte complex with moderate central canal narrowing C5-6, posterior spurs with adjacent ossification with moderate severe canal narrowing at C6/7 and bilateral foraminal narrowing at C5-6 and C6-7.    CT chest, abdomen, pelvis with no lymphadenopathy, normal splenic size, extensive pelvic floor relaxation with probable rectocele and some degree of vaginal prolapse.    The patient is seen 8/29/2023 unfortunately with a refractory cough.  This has been reviewed by both primary care and urgent care, treated with steroids for a potential viral illness.  We have discussed her findings thus far and she is to be seen by ENT 8/30/2023 likely for further evaluation of her parotid gland perhaps by resection.    The patient was seen by ENT with plans to follow her rather than go directly to surgery.    She was admitted 9/1 - 3/2023 with shortness of breath and wheezing and admission with hypoxia.  Findings were consistent with reactive airway disease and not, necessarily, pulmonary fibrosis.  She was transitioned off of steroids and continued PPI therapy.  She continued to treat for hyperglycemia and was able to be discharged.  9/13/2023 she underwent additional steroid injections to both right and left shoulders.    She is seen formally 9/26/2023.  Unfortunately she continues have episodes of shortness of breath that she believes are, in part, related  to anxiety.  She request a psychiatric assessment indicating that medications have been attempted without much effect and then she has been told by pulmonary medicine, noted above, that she does not have worsening pulmonary disease that remains tachypneic particularly in stressful situations.    The patient is next seen 4/2/2024.  She was to have follow-up possibly in June but requested reevaluation just prior to an ENT assessment now scheduled for/3/24.  This led to a CT scan of the neck 3/31/2024 that does, MD, demonstrate the mass involving the right parotid as increasing in size from 2.4 x 1.7 x 2.4 cm now 2.9 x 2.1 x 3.2 cm.  There is no additional adenopathy in the patient has not noticed any particular difference other than a sensation of fullness.  She does not have fever, chills, night sweats but does remain with shortness of breath and a degree of tachypnea at rest though she states this is not any different than usual.      Past Medical History:   Diagnosis Date    Anxiety     Arthritis     C. difficile colitis     Difficulty swallowing pills     Disease of thyroid gland     Diverticulitis     GERD (gastroesophageal reflux disease)     H/O uterine prolapse     History of esophageal dilatation     Hyperlipidemia     Hypertension     Hypothyroidism     Knee swelling 2011    Had right knee replaced first … had to choose which knee first    Osteopenia     Periarthritis of shoulder 9/26/2022    Dr. Hamilton visit    Prediabetes     Recent weight loss     Rotator cuff syndrome 9-    Dr. Hamilton diagnosed on first visit…mild    Scoliosis     Shoulder injury     Tear of meniscus of knee 2011    Had both knees replaced 2011/2022    Tremor 10/06/2023    Type 2 diabetes mellitus with hyperglycemia 09/03/2023    Uterine prolapse         Past Surgical History:   Procedure Laterality Date    CHOLECYSTECTOMY      COLONOSCOPY      ENDOSCOPY      ENDOSCOPY N/A 05/25/2021    Procedure: ESOPHAGOGASTRODUODENOSCOPY WITH  52 FRENCH BRADFORD DILATION;  Surgeon: Tripp Pascal MD;  Location[facility];  Service: Gastroenterology;  Laterality: N/A;  PRE- DYSPHAGIA  POST- ESOPHAGEAL RING    ENDOSCOPY N/A 11/3/2023    Procedure: ESOPHAGOGASTRODUODENOSCOPY with biopsy and bradford dialation;  Surgeon: Tripp Pascal MD;  Location: [facility] END[facility];  Service: Gastroenterology;  Laterality: N/A;  pre- dysphagia   post-    JOINT REPLACEMENT Right     KNEE    TONSILLECTOMY      TOTAL KNEE ARTHROPLASTY Left 11/22/2022    Procedure: LEFT TOTAL KNEE ARTHROPLASTY;  Surgeon: Antonio Riggins MD;  Location:  [facility];  Service: Orthopedics;  Laterality: Left;        Current Outpatient Medications on File Prior to Visit   Medication Sig Dispense Refill    atorvastatin (LIPITOR) 10 MG tablet Take 1 tablet by mouth Every Other Day. TAKES NIGHTLY      busPIRone (BUSPAR) 5 MG tablet Take 1 tablet by mouth 2 (Two) Times a Day.      celecoxib (CeleBREX) 200 MG capsule Take 1 capsule by mouth Every Night.      Cholecalciferol (VITAMIN D3) 5000 UNITS capsule capsule Take 1,000 Units by mouth Daily.      pantoprazole (PROTONIX) 40 MG EC tablet Take 1 tablet by mouth 2 (Two) Times a Day. (Patient taking differently: Take 1 tablet by mouth 2 (Two) Times a Day. PATIENT IS OUT OF THIS MEDICINE AND NEEDS A REFILL) 60 tablet 0    polyethylene glycol (MIRALAX) 17 GM/SCOOP powder Take 17 g by mouth Daily.      Synthroid 100 MCG tablet Take 1 tablet by mouth Daily.      vitamin C (ASCORBIC ACID) 500 MG tablet Take 1 tablet by mouth Daily.      zolpidem (AMBIEN) 5 MG tablet Take 1 tablet by mouth At Night As Needed for Sleep.       No current facility-administered medications on file prior to visit.        ALLERGIES:    Allergies   Allergen Reactions    Sulfa Antibiotics Itching     Topical        Social History     Socioeconomic History    Marital status: Single   Tobacco Use    Smoking status: Never     Passive exposure: Never    Smokeless tobacco:  "Never    Tobacco comments:     Tried in college... didn't last   Vaping Use    Vaping status: Never Used   Substance and Sexual Activity    Alcohol use: Not Currently    Drug use: Never    Sexual activity: Not Currently     Partners: Male     Birth control/protection: Pill, Diaphragm, Birth control pill     Comment: I’m 82… N/A        Family History   Problem Relation Age of Onset    Cancer Mother          1964    Early death Mother     Heart attack Father     Heart disease Father     Migraines Daughter     Malig Hyperthermia Neg Hx         Review of Systems   Constitutional:  Positive for activity change, fatigue and unexpected weight change (Weight gain- 35 lbs - 2 years).   HENT:  Positive for facial swelling. Negative for dental problem, ear discharge, ear pain and trouble swallowing.    Eyes: Negative.    Respiratory:  Positive for cough, shortness of breath and wheezing. Negative for chest tightness.    Cardiovascular:  Positive for leg swelling.   Gastrointestinal:  Positive for constipation.        Esophogeal stricture   Endocrine: Positive for heat intolerance.   Genitourinary:  Positive for frequency.   Musculoskeletal:  Positive for arthralgias (Shoulders- bilaterally) and joint swelling.   Neurological: Negative.    Psychiatric/Behavioral:  The patient is nervous/anxious.         Objective     Vitals:    24 1525   BP: 128/80   Pulse: 90   Resp: 16   Temp: 97.5 °F (36.4 °C)   TempSrc: Temporal   SpO2: 96%   Weight: 89.1 kg (196 lb 8 oz)   Height: 158.8 cm (62.5\")   PainSc: 0-No pain           2024     3:18 PM   Current Status   ECOG score 0       Physical Exam  Constitutional:       Appearance: She is obese.   HENT:      Head: Normocephalic and atraumatic.      Nose: Nose normal.      Mouth/Throat:      Mouth: Mucous membranes are moist.   Eyes:      Extraocular Movements: Extraocular movements intact.      Conjunctiva/sclera: Conjunctivae normal.      Pupils: Pupils are equal, round, " and reactive to light.   Neck:      Comments: Enlarged right parotid gland that is now reduced approximately 3 cm greatest diameter, nontender  Cardiovascular:      Rate and Rhythm: Normal rate and regular rhythm.      Pulses: Normal pulses.      Heart sounds: Normal heart sounds.   Pulmonary:      Breath sounds: Normal breath sounds.      Comments: Mildly tachypneic  Abdominal:      General: Bowel sounds are normal.      Palpations: Abdomen is soft.   Musculoskeletal:      Cervical back: Normal range of motion.      Right lower leg: Edema (Trace to 1+) present.      Left lower leg: Edema (Trace to 1+) present.   Skin:     General: Skin is warm and dry.   Neurological:      General: No focal deficit present.      Mental Status: She is oriented to person, place, and time.   Psychiatric:         Behavior: Behavior normal.      Comments: Clear anxiety           RECENT LABS:  Hematology WBC   Date Value Ref Range Status   04/02/2024 7.60 3.40 - 10.80 10*3/mm3 Final     RBC   Date Value Ref Range Status   04/02/2024 4.33 3.77 - 5.28 10*6/mm3 Final     Hemoglobin   Date Value Ref Range Status   04/02/2024 13.3 12.0 - 15.9 g/dL Final     Hematocrit   Date Value Ref Range Status   04/02/2024 41.4 34.0 - 46.6 % Final     Platelets   Date Value Ref Range Status   04/02/2024 245 140 - 450 10*3/mm3 Final          Assessment & Plan   The patient is a 83-year-old female:    with a history of arthritis, diverticulitis, GE reflux, hyperlipidemia, hypertension, hypothyroidism, prediabetes, shoulder injury and studies, more recently, demonstrating a mass and superficial of the right parotid gland that was concerning either benign or malignant tumor versus sialocele.  There is minimal lymphadenopathy throughout the neck otherwise and additional scans done, including those for IPF, demonstrate subpleural reticular nodular opacities and noncalcified micronodules.    These findings led to ultrasound-guided lymph node biopsy 6/13/2023  that was felt to be an atypical lymphoid infiltrate that, upon pathologic review by [facility] labs, was concerning for a T-cell malignancy such that the entire mass was was felt to require removal.  Immunohistochemical stains are essentially negative but significant for CD3 and CD5, interspersed CD20, Bcl-2 positive BCx and cyclin D1 negative.    An additional biopsy obtained now core sampling reveals lymphoid tissue with interfollicular expansion by an atypical mixed lymphohistiocytic and eosinophilic infiltrate with a background of reactive lymphoid hyperplasia.      Please note both the biopsies suggest excisional biopsy.    The patient is seen with her daughter and we have had a lengthy conversation as to how to proceed.  At this point it may be more prudent to have her staged as if she had been diagnosed with lymphoma looking for other sites that might be more accessible for biopsy.     The patient underwent studies including normal CBC, CMP, , sed rate of 12, CRP of less than 0.3, paraprotein analysis with no monoclonal spike, free kappa light chain 22.1, free lambda light chain 12.3 and ratio of 1.80, RF less than 10, TIGRE comprehensive antiscleroderma-70 antibody of 2.0, amylase of 66, TSH of 2.30 and CT of neck, chest, abdomen and pelvis.  CTs demonstrate a mass in the posterior inferior tip of the superficial lobe of the right parotid gland is unchanged to equivocally larger 1 2 mm from 5/12/2023 measuring 2.3 x 1.7 x 2.5 compared to 2.1 x 1.6 x 2.5 obtained 5/12/2023.  There is a stable 5 mm lymph node just inferior to the posterior inferior tip of the superficial lobe of the right parotid gland and a 12 x 11 mm lymph node in superior hyoid right jugulodigastric chain thought to be reactive, mild asymmetric prominence right right inguinal tonsils compared to the left, stable cervical spondylosis with posterior posterior disc osteophyte complex with moderate central canal narrowing C5-6, posterior spurs  with adjacent ossification with moderate severe canal narrowing at C6/7 and bilateral foraminal narrowing at C5-6 and C6-7.    CT chest, abdomen, pelvis with no lymphadenopathy, normal splenic size, extensive pelvic floor relaxation with probable rectocele and some degree of vaginal prolapse.    The patient is seen 8/29/2023 unfortunately with a refractory cough.  This has been reviewed by both primary care and urgent care, treated with steroids for a potential viral illness.  We have discussed her findings thus far and she is to be seen by ENT 8/30/2023 likely for further evaluation of her parotid gland perhaps by resection.    The patient was seen by ENT with plans to follow her rather than go directly to surgery.    She was admitted 9/1 - 3/2023 with shortness of breath and wheezing and admission with hypoxia.  Findings were consistent with reactive airway disease and not, necessarily, pulmonary fibrosis.  She was transitioned off of steroids and continued PPI therapy.  She continued to treat for hyperglycemia and was able to be discharged.  9/13/2023 she underwent additional steroid injections to both right and left shoulders.    She is seen formally 9/26/2023.  Unfortunately she continues have episodes of shortness of breath that she believes are, in part, related to anxiety.  She request a psychiatric assessment indicating that medications have been attempted without much effect and then she has been told by pulmonary medicine, noted above, that she does not have worsening pulmonary disease that remains tachypneic particularly in stressful situations.    The patient is next seen 4/2/2024.  She was to have follow-up possibly in June but requested reevaluation just prior to an ENT assessment now scheduled for/3/24.  This led to a CT scan of the neck 3/31/2024 that does, MD, demonstrate the mass involving the right parotid as increasing in size from 2.4 x 1.7 x 2.4 cm now 2.9 x 2.1 x 3.2 cm.  There is no  additional adenopathy in the patient has not noticed any particular difference other than a sensation of fullness.  She does not have fever, chills, night sweats but does remain with shortness of breath and a degree of tachypnea at rest though she states this is not any different than usual.        Plan:    *ENT assessment/3/24 and possibly repeat biopsy is felt likely    *Return to laboratory today for PT PTT, LDH, sed rate and CRP    *3-month follow-up MD

## 2024-04-02 ENCOUNTER — OFFICE VISIT (OUTPATIENT)
Dept: ONCOLOGY | Facility: CLINIC | Age: 84
End: 2024-04-02
Payer: MEDICARE

## 2024-04-02 ENCOUNTER — LAB (OUTPATIENT)
Dept: OTHER | Facility: HOSPITAL | Age: 84
End: 2024-04-02
Payer: MEDICARE

## 2024-04-02 VITALS
BODY MASS INDEX: 34.82 KG/M2 | TEMPERATURE: 97.5 F | WEIGHT: 196.5 LBS | DIASTOLIC BLOOD PRESSURE: 80 MMHG | OXYGEN SATURATION: 96 % | HEART RATE: 90 BPM | SYSTOLIC BLOOD PRESSURE: 128 MMHG | RESPIRATION RATE: 16 BRPM | HEIGHT: 63 IN

## 2024-04-02 DIAGNOSIS — E03.9 HYPOTHYROIDISM (ACQUIRED): ICD-10-CM

## 2024-04-02 DIAGNOSIS — K11.8 PAROTID MASS: Primary | ICD-10-CM

## 2024-04-02 DIAGNOSIS — M79.89 OTHER SPECIFIED SOFT TISSUE DISORDERS: ICD-10-CM

## 2024-04-02 DIAGNOSIS — J04.10 ACUTE TRACHEITIS WITHOUT OBSTRUCTION: ICD-10-CM

## 2024-04-02 DIAGNOSIS — K11.8 PAROTID MASS: ICD-10-CM

## 2024-04-02 LAB
ALBUMIN SERPL-MCNC: 4.4 G/DL (ref 3.5–5.2)
ALBUMIN/GLOB SERPL: 1.5 G/DL
ALP SERPL-CCNC: 100 U/L (ref 39–117)
ALT SERPL W P-5'-P-CCNC: 23 U/L (ref 1–33)
ANION GAP SERPL CALCULATED.3IONS-SCNC: 9.6 MMOL/L (ref 5–15)
AST SERPL-CCNC: 22 U/L (ref 1–32)
BASOPHILS # BLD AUTO: 0.03 10*3/MM3 (ref 0–0.2)
BASOPHILS NFR BLD AUTO: 0.4 % (ref 0–1.5)
BILIRUB SERPL-MCNC: 0.5 MG/DL (ref 0–1.2)
BUN SERPL-MCNC: 31 MG/DL (ref 8–23)
BUN/CREAT SERPL: 33.7 (ref 7–25)
CALCIUM SPEC-SCNC: 10.1 MG/DL (ref 8.6–10.5)
CHLORIDE SERPL-SCNC: 101 MMOL/L (ref 98–107)
CO2 SERPL-SCNC: 27.4 MMOL/L (ref 22–29)
CREAT SERPL-MCNC: 0.92 MG/DL (ref 0.57–1)
CRP SERPL-MCNC: <0.3 MG/DL (ref 0–0.5)
DEPRECATED RDW RBC AUTO: 53.4 FL (ref 37–54)
EGFRCR SERPLBLD CKD-EPI 2021: 61.9 ML/MIN/1.73
EOSINOPHIL # BLD AUTO: 0.2 10*3/MM3 (ref 0–0.4)
EOSINOPHIL NFR BLD AUTO: 2.6 % (ref 0.3–6.2)
ERYTHROCYTE [DISTWIDTH] IN BLOOD BY AUTOMATED COUNT: 15.3 % (ref 12.3–15.4)
ERYTHROCYTE [SEDIMENTATION RATE] IN BLOOD: 5 MM/HR (ref 0–30)
GLOBULIN UR ELPH-MCNC: 3 GM/DL
GLUCOSE SERPL-MCNC: 117 MG/DL (ref 65–99)
HCT VFR BLD AUTO: 41.4 % (ref 34–46.6)
HGB BLD-MCNC: 13.3 G/DL (ref 12–15.9)
IMM GRANULOCYTES # BLD AUTO: 0.02 10*3/MM3 (ref 0–0.05)
IMM GRANULOCYTES NFR BLD AUTO: 0.3 % (ref 0–0.5)
INR PPP: 1
LDH SERPL-CCNC: 191 U/L (ref 135–214)
LYMPHOCYTES # BLD AUTO: 1.4 10*3/MM3 (ref 0.7–3.1)
LYMPHOCYTES NFR BLD AUTO: 18.4 % (ref 19.6–45.3)
MCH RBC QN AUTO: 30.7 PG (ref 26.6–33)
MCHC RBC AUTO-ENTMCNC: 32.1 G/DL (ref 31.5–35.7)
MCV RBC AUTO: 95.6 FL (ref 79–97)
MONOCYTES # BLD AUTO: 0.53 10*3/MM3 (ref 0.1–0.9)
MONOCYTES NFR BLD AUTO: 7 % (ref 5–12)
NEUTROPHILS NFR BLD AUTO: 5.42 10*3/MM3 (ref 1.7–7)
NEUTROPHILS NFR BLD AUTO: 71.3 % (ref 42.7–76)
NRBC BLD AUTO-RTO: 0 /100 WBC (ref 0–0.2)
PLATELET # BLD AUTO: 245 10*3/MM3 (ref 140–450)
PMV BLD AUTO: 9.4 FL (ref 6–12)
POTASSIUM SERPL-SCNC: 4.2 MMOL/L (ref 3.5–5.2)
PROT SERPL-MCNC: 7.4 G/DL (ref 6–8.5)
PROTHROMBIN TIME: 12.4 SECONDS (ref 11–15)
RBC # BLD AUTO: 4.33 10*6/MM3 (ref 3.77–5.28)
SODIUM SERPL-SCNC: 138 MMOL/L (ref 136–145)
WBC NRBC COR # BLD AUTO: 7.6 10*3/MM3 (ref 3.4–10.8)

## 2024-04-02 PROCEDURE — 1126F AMNT PAIN NOTED NONE PRSNT: CPT | Performed by: INTERNAL MEDICINE

## 2024-04-02 PROCEDURE — 3079F DIAST BP 80-89 MM HG: CPT | Performed by: INTERNAL MEDICINE

## 2024-04-02 PROCEDURE — 3074F SYST BP LT 130 MM HG: CPT | Performed by: INTERNAL MEDICINE

## 2024-04-02 PROCEDURE — 85652 RBC SED RATE AUTOMATED: CPT | Performed by: INTERNAL MEDICINE

## 2024-04-02 PROCEDURE — 80053 COMPREHEN METABOLIC PANEL: CPT | Performed by: INTERNAL MEDICINE

## 2024-04-02 PROCEDURE — 36415 COLL VENOUS BLD VENIPUNCTURE: CPT

## 2024-04-02 PROCEDURE — 83615 LACTATE (LD) (LDH) ENZYME: CPT | Performed by: INTERNAL MEDICINE

## 2024-04-02 PROCEDURE — 86140 C-REACTIVE PROTEIN: CPT | Performed by: INTERNAL MEDICINE

## 2024-04-02 PROCEDURE — 85025 COMPLETE CBC W/AUTO DIFF WBC: CPT | Performed by: INTERNAL MEDICINE

## 2024-04-02 PROCEDURE — 85610 PROTHROMBIN TIME: CPT | Performed by: INTERNAL MEDICINE

## 2024-04-02 PROCEDURE — 85730 THROMBOPLASTIN TIME PARTIAL: CPT | Performed by: INTERNAL MEDICINE

## 2024-04-02 PROCEDURE — 99214 OFFICE O/P EST MOD 30 MIN: CPT | Performed by: INTERNAL MEDICINE

## 2024-04-02 RX ORDER — BUSPIRONE HYDROCHLORIDE 5 MG/1
5 TABLET ORAL 2 TIMES DAILY
COMMUNITY

## 2024-04-03 LAB — APTT PPP: 25.4 SECONDS (ref 22.7–35.4)

## 2024-05-15 ENCOUNTER — HOSPITAL ENCOUNTER (OUTPATIENT)
Dept: BONE DENSITY | Facility: HOSPITAL | Age: 84
Discharge: HOME | End: 2024-05-15
Admitting: INTERNAL MEDICINE
Payer: MEDICARE

## 2024-05-15 ENCOUNTER — APPOINTMENT (OUTPATIENT)
Dept: CT IMAGING | Facility: HOSPITAL | Age: 84
End: 2024-05-15
Payer: MEDICARE

## 2024-05-15 DIAGNOSIS — M81.0 OSTEOPOROSIS, UNSPECIFIED OSTEOPOROSIS TYPE, UNSPECIFIED PATHOLOGICAL FRACTURE PRESENCE: ICD-10-CM

## 2024-05-15 PROCEDURE — 77080 DXA BONE DENSITY AXIAL: CPT

## 2024-05-23 ENCOUNTER — TRANSCRIBE ORDERS (OUTPATIENT)
Dept: ADMINISTRATIVE | Facility: HOSPITAL | Age: 84
End: 2024-05-23
Payer: MEDICARE

## 2024-05-23 ENCOUNTER — LAB (OUTPATIENT)
Dept: LAB | Facility: HOSPITAL | Age: 84
End: 2024-05-23
Payer: MEDICARE

## 2024-05-23 ENCOUNTER — TELEPHONE (OUTPATIENT)
Dept: ONCOLOGY | Facility: CLINIC | Age: 84
End: 2024-05-23
Payer: MEDICARE

## 2024-05-23 DIAGNOSIS — E11.9 DIABETES MELLITUS WITHOUT COMPLICATION: Primary | ICD-10-CM

## 2024-05-23 DIAGNOSIS — E11.9 DIABETES MELLITUS WITHOUT COMPLICATION: ICD-10-CM

## 2024-05-23 LAB — HBA1C MFR BLD: 6.2 % (ref 4.8–5.6)

## 2024-05-23 PROCEDURE — 83036 HEMOGLOBIN GLYCOSYLATED A1C: CPT

## 2024-05-23 PROCEDURE — 36415 COLL VENOUS BLD VENIPUNCTURE: CPT

## 2024-05-23 NOTE — TELEPHONE ENCOUNTER
Caller: Madisyn Plascencia    Relationship: Self    Best call back number:     [phone]     Who are you requesting to speak with (clinical staff, provider,  specific staff member): CLINICAL    What was the call regarding: DR GUERRERO WANTED THE PT TO LET DR GIORDANO KNOW THAT THE NEEDLE BX RESULTS HAVE BEEN EMAILED TO HIM. PT WILL BE GOING TO SEE HER PCP DR MOORE AT 2:30 AND DR GUERRERO HAD WANTED DR GIORDANO TO REACH OUT TO THE PCP TO LET HER KNOW ABOUT BX RESULTS IF HE CAN.     PLEASE ADVISE IF DR GIORDANO HAS THESE AND HAS LET THE PCP KNOW.     Is it okay if the provider responds through [facility]: NO NEED TO CALL BACK. PT JUST WOULD LIKE TO MAKE SURE THAT DR GIORDANO HAS RECEIVED THOSE RESULT AND DR GUERRERO HAD SUGGESTED THE PT CALL TO MAKE SURE DR GIORDANO IS AWARE.

## 2024-05-23 NOTE — TELEPHONE ENCOUNTER
Called pt and informed her Dr. Wolf is on vacation but I would send him a message alerting him to the biopsy results in [facility]. Pt v/u. Message sent to Dr. Wolf.

## 2024-06-24 NOTE — PROGRESS NOTES
REASON FOR CONSULTATION: Potential lymphoma involving parotid gland                               REQUESTING PHYSICIAN: Katarzyna Bland MD, Alek Brunson MD    RECORDS OBTAINED:  Records of the patients history including those obtained from the referring provider were reviewed and summarized in detail.    HISTORY OF PRESENT ILLNESS:  The patient is a 83 y.o. year old female who is here for an opinion about the above issue.    History of Present Illness     The patient is now an 83-year-old female followed by primary care for the below medical disorders who had undergone studies recently including February 20, 2023 when she had increasing shortness of breath with a CTA that failed to show any evidence of pulmonary embolism.  Additional duplex lower extremities included left popliteal fossa, normal bilateral lower extremity studies and, with a history of idiopathic pulmonary fibrosis a high-resolution chest CT 4/11/2023 showed bibasilar atelectasis, curvilinear opacity in the right lower lobe and subpleural reticular nodule opacities, calcified and noncalcified micronodules without substantial change from previous.    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  There were mildly prominent nodes throughout the neck but mostly within the right level 2 and right level 3 regions?  Metastatic adenopathy.      These findings led to an ultrasound-guided lymph node biopsy 6/13/2023 that was felt to be an atypical lymphoid infiltrate that, upon pathologic review by [facility] labs, was concerning for a T-cell malignancy such that the entire mass was was felt to require removal.  Immunohistochemical stains are essentially  "negative but significant for CD3 and CD5, interspersed CD20, Bcl-2 positive BCx and cyclin D1 negative.    n additional biopsy obtained now core sampling reveals lymphoid tissue with interfollicular expansion by an atypical mixed lymphohistiocytic and eosinophilic infiltrate with a background of reactive lymphoid hyperplasia.      Please note both the biopsies suggest excisional biopsy.      Concurrently she has considerable orthopedic issues including suspected bilateral rotator cuff tears assessed in April treated with injection therapy 6/7/2023 through orthopedics.       We are asked to see the patient for her potential lymphoma.  She indicates that she has been having symptoms for several months at least from December of general weakness and fatigue as her analysis has been continuing.  Please see review of systems below.  She had noted over the 2 to 3-month period of development of right parotid gland swelling nonpainful and not progressing clearly in size more recently but clearly noticeable over the 2 to 3 months.  As result of the biopsy the area has \"changed\".  She has not had fever, chills, night sweats but clearly reduction in performance status.  She indicates that some of this was affected by development of COVID from which she thought she had recovered.    Additional family history include the parotid gland enlargement-benign-and her daughter who attends with her in office visit 8/8/2023 in the history and her son of high-grade lymphoma diagnosed 1985 treated with apparent [facility] chemotherapy.  He has not had a relapse.    The patient underwent studies including normal CBC, CMP, , sed rate of 12, CRP of less than 0.3, paraprotein analysis with no monoclonal spike, free kappa light chain 22.1, free lambda light chain 12.3 and ratio of 1.80, RF less than 10, TIGRE comprehensive antiscleroderma-70 antibody of 2.0, amylase of 66, TSH of 2.30 and CT of neck, chest, abdomen and pelvis.  CTs demonstrate a " mass in the posterior inferior tip of the superficial lobe of the right parotid gland is unchanged to equivocally larger 1 2 mm from 5/12/2023 measuring 2.3 x 1.7 x 2.5 compared to 2.1 x 1.6 x 2.5 obtained 5/12/2023.  There is a stable 5 mm lymph node just inferior to the posterior inferior tip of the superficial lobe of the right parotid gland and a 12 x 11 mm lymph node in superior hyoid right jugulodigastric chain thought to be reactive, mild asymmetric prominence right right inguinal tonsils compared to the left, stable cervical spondylosis with posterior posterior disc osteophyte complex with moderate central canal narrowing C5-6, posterior spurs with adjacent ossification with moderate severe canal narrowing at C6/7 and bilateral foraminal narrowing at C5-6 and C6-7.    CT chest, abdomen, pelvis with no lymphadenopathy, normal splenic size, extensive pelvic floor relaxation with probable rectocele and some degree of vaginal prolapse.    The patient is seen 8/29/2023 unfortunately with a refractory cough.  This has been reviewed by both primary care and urgent care, treated with steroids for a potential viral illness.  We have discussed her findings thus far and she is to be seen by ENT 8/30/2023 likely for further evaluation of her parotid gland perhaps by resection.    The patient was seen by ENT with plans to follow her rather than go directly to surgery.    She was admitted 9/1 - 3/2023 with shortness of breath and wheezing and admission with hypoxia.  Findings were consistent with reactive airway disease and not, necessarily, pulmonary fibrosis.  She was transitioned off of steroids and continued PPI therapy.  She continued to treat for hyperglycemia and was able to be discharged.  9/13/2023 she underwent additional steroid injections to both right and left shoulders.    She is seen formally 9/26/2023.  Unfortunately she continues have episodes of shortness of breath that she believes are, in part, related  to anxiety.  She request a psychiatric assessment indicating that medications have been attempted without much effect and then she has been told by pulmonary medicine, noted above, that she does not have worsening pulmonary disease that remains tachypneic particularly in stressful situations.    The patient is next seen 4/2/2024.  She was to have follow-up possibly in June but requested reevaluation just prior to an ENT assessment now scheduled for/3/24.  This led to a CT scan of the neck 3/31/2024 that does, MD, demonstrate the mass involving the right parotid as increasing in size from 2.4 x 1.7 x 2.4 cm now 2.9 x 2.1 x 3.2 cm.  There is no additional adenopathy in the patient has not noticed any particular difference other than a sensation of fullness.  She does not have fever, chills, night sweats but does remain with shortness of breath and a degree of tachypnea at rest though she states this is not any different than usual.    The patient was referred to ENT undergoing a repeat biopsy through Dr. Brunson with findings of lymphoid tissue and interfollicular expansion by mixed lymphohistiocytic infiltrate and eosinophilia.  There is no evidence of lymphoma or mast cell neoplasm plasma cell neoplasm or Langerhans' cell histiocytosis.    The patient was referred to [facility] seen  who advised surgical removal for definitive diagnosis.  This is discussed with the patient in detail when seen 6/25/2024.  She is conflicted as to whether to pursue the surgery but we are, unfortunately, at an impasse in providing a real diagnosis for her and this would continue to be the case if we simply continue to observe.  She is urged to proceed with surgery and a copy this note will be sent to .      Past Medical History:   Diagnosis Date    Anxiety     Arthritis     C. difficile colitis     Difficulty swallowing pills     Disease of thyroid gland     Diverticulitis     GERD (gastroesophageal reflux disease)     H/O  uterine prolapse     History of esophageal dilatation     Hyperlipidemia     Hypertension     Hypothyroidism     Knee swelling 2011    Had right knee replaced first … had to choose which knee first    Osteopenia     Periarthritis of shoulder 9/26/2022    Dr. Hamilton visit    Prediabetes     Recent weight loss     Rotator cuff syndrome 9-    Dr. Hamilton diagnosed on first visit…mild    Scoliosis     Shoulder injury     Tear of meniscus of knee 2011    Had both knees replaced 2011/2022    Tremor 10/06/2023    Type 2 diabetes mellitus with hyperglycemia 09/03/2023    Uterine prolapse         Past Surgical History:   Procedure Laterality Date    CHOLECYSTECTOMY      COLONOSCOPY      ENDOSCOPY      ENDOSCOPY N/A 05/25/2021    Procedure: ESOPHAGOGASTRODUODENOSCOPY WITH 52 [address] BRADFORD DILATION;  Surgeon: Tripp Pascal MD;  Location: [facility] ENDOSCOPY;  Service: Gastroenterology;  Laterality: N/A;  PRE- DYSPHAGIA  POST- ESOPHAGEAL RING    ENDOSCOPY N/A 11/3/2023    Procedure: ESOPHAGOGASTRODUODENOSCOPY with biopsy and bradford dialation;  Surgeon: Tripp Pascal MD;  Location: [facility] ENDOSCOPY;  Service: Gastroenterology;  Laterality: N/A;  pre- dysphagia   post-    JOINT REPLACEMENT Right     KNEE    TONSILLECTOMY      TOTAL KNEE ARTHROPLASTY Left 11/22/2022    Procedure: LEFT TOTAL KNEE ARTHROPLASTY;  Surgeon: Antonio Riggins MD;  Location: [facility] OR [place];  Service: Orthopedics;  Laterality: Left;        Current Outpatient Medications on File Prior to Visit   Medication Sig Dispense Refill    atorvastatin (LIPITOR) 10 MG tablet Take 1 tablet by mouth Every Other Day. TAKES NIGHTLY      celecoxib (CeleBREX) 200 MG capsule Take 1 capsule by mouth Every Night.      Cholecalciferol (VITAMIN D3) 5000 UNITS capsule capsule Take 1,000 Units by mouth Daily.      pantoprazole (PROTONIX) 40 MG EC tablet Take 1 tablet by mouth 2 (Two) Times a Day. (Patient taking differently: Take 1 tablet by mouth 2 (Two) Times a Day.  PATIENT IS OUT OF THIS MEDICINE AND NEEDS A REFILL) 60 tablet 0    polyethylene glycol (MIRALAX) 17 GM/SCOOP powder Take 17 g by mouth Daily.      Synthroid 100 MCG tablet Take 1 tablet by mouth Daily.      vitamin C (ASCORBIC ACID) 500 MG tablet Take 1 tablet by mouth Daily.      zolpidem (AMBIEN) 5 MG tablet Take 1 tablet by mouth At Night As Needed for Sleep.      busPIRone (BUSPAR) 5 MG tablet Take 1 tablet by mouth 2 (Two) Times a Day. (Patient not taking: Reported on 2024)       No current facility-administered medications on file prior to visit.        ALLERGIES:    Allergies   Allergen Reactions    Sulfa Antibiotics Itching     Topical        Social History     Socioeconomic History    Marital status: Single   Tobacco Use    Smoking status: Never     Passive exposure: Never    Smokeless tobacco: Never    Tobacco comments:     Tried in college... didn't last   Vaping Use    Vaping status: Never Used   Substance and Sexual Activity    Alcohol use: Not Currently    Drug use: Never    Sexual activity: Not Currently     Partners: Male     Birth control/protection: Pill, Diaphragm, Birth control pill     Comment: I’m 82… N/A        Family History   Problem Relation Age of Onset    Cancer Mother          1964    Early death Mother     Heart attack Father     Heart disease Father     Migraines Daughter     Malig Hyperthermia Neg Hx         Review of Systems   Constitutional:  Positive for activity change, fatigue and unexpected weight change (Weight gain- 35 lbs - 2 years).   HENT:  Positive for facial swelling. Negative for dental problem, ear discharge, ear pain and trouble swallowing.    Eyes: Negative.    Respiratory:  Positive for cough, shortness of breath and wheezing. Negative for chest tightness.    Cardiovascular:  Positive for leg swelling.   Gastrointestinal:  Positive for constipation.        Esophogeal stricture   Endocrine: Positive for heat intolerance.   Genitourinary:  Positive for  "frequency.   Musculoskeletal:  Positive for arthralgias (Shoulders- bilaterally) and joint swelling.   Neurological: Negative.    Psychiatric/Behavioral:  The patient is nervous/anxious.         Objective     Vitals:    06/25/24 1421   BP: 136/85   Pulse: 76   Resp: 16   Temp: 98.1 °F (36.7 °C)   TempSrc: Oral   SpO2: 97%   Weight: 89 kg (196 lb 1.6 oz)   Height: 158 cm (62.21\")   PainSc: 0-No pain             6/25/2024     2:24 PM   Current Status   ECOG score 1       Physical Exam  Constitutional:       Appearance: She is obese.   HENT:      Head: Normocephalic and atraumatic.      Nose: Nose normal.      Mouth/Throat:      Mouth: Mucous membranes are moist.   Eyes:      Extraocular Movements: Extraocular movements intact.      Conjunctiva/sclera: Conjunctivae normal.      Pupils: Pupils are equal, round, and reactive to light.   Neck:      Comments: Enlarged right parotid gland that is now reduced approximately 3 cm greatest diameter, nontender  Cardiovascular:      Rate and Rhythm: Normal rate and regular rhythm.      Pulses: Normal pulses.      Heart sounds: Normal heart sounds.   Pulmonary:      Breath sounds: Normal breath sounds.      Comments: Mildly tachypneic  Abdominal:      General: Bowel sounds are normal.      Palpations: Abdomen is soft.   Musculoskeletal:      Cervical back: Normal range of motion.      Right lower leg: Edema (Trace to 1+) present.      Left lower leg: Edema (Trace to 1+) present.   Skin:     General: Skin is warm and dry.   Neurological:      General: No focal deficit present.      Mental Status: She is oriented to person, place, and time.   Psychiatric:         Behavior: Behavior normal.      Comments: Clear anxiety           RECENT LABS:  Hematology WBC   Date Value Ref Range Status   06/25/2024 8.72 3.40 - 10.80 10*3/mm3 Final     RBC   Date Value Ref Range Status   06/25/2024 4.43 3.77 - 5.28 10*6/mm3 Final     Hemoglobin   Date Value Ref Range Status   06/25/2024 13.5 12.0 - " 15.9 g/dL Final     Hematocrit   Date Value Ref Range Status   06/25/2024 40.8 34.0 - 46.6 % Final     Platelets   Date Value Ref Range Status   06/25/2024 248 140 - 450 10*3/mm3 Final          Assessment & Plan   The patient is a 83-year-old female:    with a history of arthritis, diverticulitis, GE reflux, hyperlipidemia, hypertension, hypothyroidism, prediabetes, shoulder injury and studies, more recently, demonstrating a mass and superficial of the right parotid gland that was concerning either benign or malignant tumor versus sialocele.  There is minimal lymphadenopathy throughout the neck otherwise and additional scans done, including those for IPF, demonstrate subpleural reticular nodular opacities and noncalcified micronodules.    These findings led to ultrasound-guided lymph node biopsy 6/13/2023 that was felt to be an atypical lymphoid infiltrate that, upon pathologic review by [facility] labs, was concerning for a T-cell malignancy such that the entire mass was was felt to require removal.  Immunohistochemical stains are essentially negative but significant for CD3 and CD5, interspersed CD20, Bcl-2 positive BCx and cyclin D1 negative.    An additional biopsy obtained now core sampling reveals lymphoid tissue with interfollicular expansion by an atypical mixed lymphohistiocytic and eosinophilic infiltrate with a background of reactive lymphoid hyperplasia.      Please note both the biopsies suggest excisional biopsy.    The patient is seen with her daughter and we have had a lengthy conversation as to how to proceed.  At this point it may be more prudent to have her staged as if she had been diagnosed with lymphoma looking for other sites that might be more accessible for biopsy.     The patient underwent studies including normal CBC, CMP, , sed rate of 12, CRP of less than 0.3, paraprotein analysis with no monoclonal spike, free kappa light chain 22.1, free lambda light chain 12.3 and ratio of 1.80, RF  less than 10, TIGRE comprehensive antiscleroderma-70 antibody of 2.0, amylase of 66, TSH of 2.30 and CT of neck, chest, abdomen and pelvis.  CTs demonstrate a mass in the posterior inferior tip of the superficial lobe of the right parotid gland is unchanged to equivocally larger 1 2 mm from 5/12/2023 measuring 2.3 x 1.7 x 2.5 compared to 2.1 x 1.6 x 2.5 obtained 5/12/2023.  There is a stable 5 mm lymph node just inferior to the posterior inferior tip of the superficial lobe of the right parotid gland and a 12 x 11 mm lymph node in superior hyoid right jugulodigastric chain thought to be reactive, mild asymmetric prominence right right inguinal tonsils compared to the left, stable cervical spondylosis with posterior posterior disc osteophyte complex with moderate central canal narrowing C5-6, posterior spurs with adjacent ossification with moderate severe canal narrowing at C6/7 and bilateral foraminal narrowing at C5-6 and C6-7.    CT chest, abdomen, pelvis with no lymphadenopathy, normal splenic size, extensive pelvic floor relaxation with probable rectocele and some degree of vaginal prolapse.    The patient is seen 8/29/2023 unfortunately with a refractory cough.  This has been reviewed by both primary care and urgent care, treated with steroids for a potential viral illness.  We have discussed her findings thus far and she is to be seen by ENT 8/30/2023 likely for further evaluation of her parotid gland perhaps by resection.    The patient was seen by ENT with plans to follow her rather than go directly to surgery.    She was admitted 9/1 - 3/2023 with shortness of breath and wheezing and admission with hypoxia.  Findings were consistent with reactive airway disease and not, necessarily, pulmonary fibrosis.  She was transitioned off of steroids and continued PPI therapy.  She continued to treat for hyperglycemia and was able to be discharged.  9/13/2023 she underwent additional steroid injections to both right and  left shoulders.    She is seen formally 9/26/2023.  Unfortunately she continues have episodes of shortness of breath that she believes are, in part, related to anxiety.  She request a psychiatric assessment indicating that medications have been attempted without much effect and then she has been told by pulmonary medicine, noted above, that she does not have worsening pulmonary disease that remains tachypneic particularly in stressful situations.    The patient is next seen 4/2/2024.  She was to have follow-up possibly in June but requested reevaluation just prior to an ENT assessment now scheduled for/3/24.  This led to a CT scan of the neck 3/31/2024 that does, MD, demonstrate the mass involving the right parotid as increasing in size from 2.4 x 1.7 x 2.4 cm now 2.9 x 2.1 x 3.2 cm.  There is no additional adenopathy in the patient has not noticed any particular difference other than a sensation of fullness.  She does not have fever, chills, night sweats but does remain with shortness of breath and a degree of tachypnea at rest though she states this is not any different than usual.    The patient was referred to ENT undergoing a repeat biopsy through Dr. Brunson with findings of lymphoid tissue and interfollicular expansion by mixed lymphohistiocytic infiltrate and eosinophilia.  There is no evidence of lymphoma or mast cell neoplasm plasma cell neoplasm or Langerhans' cell histiocytosis.    The patient was referred to [facility] seen  who advised surgical removal for definitive diagnosis.  This is discussed with the patient in detail when seen 6/25/2024.  She is conflicted as to whether to pursue the surgery but we are, unfortunately, at an impasse in providing a real diagnosis for her and this would continue to be the case if we simply continue to observe.  She is urged to proceed with surgery and a copy this note will be sent to .      Plan:  *Again copy this note to Dr. Kong    *4-month follow-up MD,  CBC, CMP

## 2024-06-25 ENCOUNTER — OFFICE VISIT (OUTPATIENT)
Dept: ONCOLOGY | Facility: CLINIC | Age: 84
End: 2024-06-25
Payer: MEDICARE

## 2024-06-25 ENCOUNTER — LAB (OUTPATIENT)
Dept: OTHER | Facility: HOSPITAL | Age: 84
End: 2024-06-25
Payer: MEDICARE

## 2024-06-25 VITALS
SYSTOLIC BLOOD PRESSURE: 136 MMHG | TEMPERATURE: 98.1 F | WEIGHT: 196.1 LBS | HEIGHT: 62 IN | DIASTOLIC BLOOD PRESSURE: 85 MMHG | OXYGEN SATURATION: 97 % | BODY MASS INDEX: 36.09 KG/M2 | HEART RATE: 76 BPM | RESPIRATION RATE: 16 BRPM

## 2024-06-25 DIAGNOSIS — K11.8 PAROTID MASS: Primary | ICD-10-CM

## 2024-06-25 DIAGNOSIS — M79.89 OTHER SPECIFIED SOFT TISSUE DISORDERS: ICD-10-CM

## 2024-06-25 DIAGNOSIS — E03.9 HYPOTHYROIDISM (ACQUIRED): ICD-10-CM

## 2024-06-25 DIAGNOSIS — K11.8 PAROTID MASS: ICD-10-CM

## 2024-06-25 LAB
BASOPHILS # BLD AUTO: 0.05 10*3/MM3 (ref 0–0.2)
BASOPHILS NFR BLD AUTO: 0.6 % (ref 0–1.5)
DEPRECATED RDW RBC AUTO: 48.6 FL (ref 37–54)
EOSINOPHIL # BLD AUTO: 0.16 10*3/MM3 (ref 0–0.4)
EOSINOPHIL NFR BLD AUTO: 1.8 % (ref 0.3–6.2)
ERYTHROCYTE [DISTWIDTH] IN BLOOD BY AUTOMATED COUNT: 14.4 % (ref 12.3–15.4)
HCT VFR BLD AUTO: 40.8 % (ref 34–46.6)
HGB BLD-MCNC: 13.5 G/DL (ref 12–15.9)
IMM GRANULOCYTES # BLD AUTO: 0.05 10*3/MM3 (ref 0–0.05)
IMM GRANULOCYTES NFR BLD AUTO: 0.6 % (ref 0–0.5)
LYMPHOCYTES # BLD AUTO: 1.72 10*3/MM3 (ref 0.7–3.1)
LYMPHOCYTES NFR BLD AUTO: 19.7 % (ref 19.6–45.3)
MCH RBC QN AUTO: 30.5 PG (ref 26.6–33)
MCHC RBC AUTO-ENTMCNC: 33.1 G/DL (ref 31.5–35.7)
MCV RBC AUTO: 92.1 FL (ref 79–97)
MONOCYTES # BLD AUTO: 0.67 10*3/MM3 (ref 0.1–0.9)
MONOCYTES NFR BLD AUTO: 7.7 % (ref 5–12)
NEUTROPHILS NFR BLD AUTO: 6.07 10*3/MM3 (ref 1.7–7)
NEUTROPHILS NFR BLD AUTO: 69.6 % (ref 42.7–76)
NRBC BLD AUTO-RTO: 0 /100 WBC (ref 0–0.2)
PLATELET # BLD AUTO: 248 10*3/MM3 (ref 140–450)
PMV BLD AUTO: 10.1 FL (ref 6–12)
RBC # BLD AUTO: 4.43 10*6/MM3 (ref 3.77–5.28)
WBC NRBC COR # BLD AUTO: 8.72 10*3/MM3 (ref 3.4–10.8)

## 2024-06-25 PROCEDURE — 1126F AMNT PAIN NOTED NONE PRSNT: CPT | Performed by: INTERNAL MEDICINE

## 2024-06-25 PROCEDURE — 3079F DIAST BP 80-89 MM HG: CPT | Performed by: INTERNAL MEDICINE

## 2024-06-25 PROCEDURE — 36415 COLL VENOUS BLD VENIPUNCTURE: CPT

## 2024-06-25 PROCEDURE — 85025 COMPLETE CBC W/AUTO DIFF WBC: CPT | Performed by: INTERNAL MEDICINE

## 2024-06-25 PROCEDURE — 99214 OFFICE O/P EST MOD 30 MIN: CPT | Performed by: INTERNAL MEDICINE

## 2024-06-25 PROCEDURE — 3075F SYST BP GE 130 - 139MM HG: CPT | Performed by: INTERNAL MEDICINE

## 2024-07-09 ENCOUNTER — LAB (OUTPATIENT)
Dept: LAB | Facility: HOSPITAL | Age: 84
End: 2024-07-09
Payer: MEDICARE

## 2024-07-09 ENCOUNTER — TRANSCRIBE ORDERS (OUTPATIENT)
Dept: LAB | Facility: HOSPITAL | Age: 84
End: 2024-07-09
Payer: MEDICARE

## 2024-07-09 DIAGNOSIS — R51.9 FACIAL PAIN: ICD-10-CM

## 2024-07-09 DIAGNOSIS — E03.9 MYXEDEMA HEART DISEASE: ICD-10-CM

## 2024-07-09 DIAGNOSIS — I51.9 MYXEDEMA HEART DISEASE: ICD-10-CM

## 2024-07-09 DIAGNOSIS — E11.9 DIABETES MELLITUS WITHOUT COMPLICATION: ICD-10-CM

## 2024-07-09 DIAGNOSIS — E11.9 DIABETES MELLITUS WITHOUT COMPLICATION: Primary | ICD-10-CM

## 2024-07-09 LAB
ERYTHROCYTE [SEDIMENTATION RATE] IN BLOOD: 8 MM/HR (ref 0–30)
HBA1C MFR BLD: 6.3 % (ref 4.8–5.6)
T4 FREE SERPL-MCNC: 1.73 NG/DL (ref 0.92–1.68)
TSH SERPL DL<=0.05 MIU/L-ACNC: 1.63 UIU/ML (ref 0.27–4.2)

## 2024-07-09 PROCEDURE — 85652 RBC SED RATE AUTOMATED: CPT

## 2024-07-09 PROCEDURE — 83036 HEMOGLOBIN GLYCOSYLATED A1C: CPT

## 2024-07-09 PROCEDURE — 84443 ASSAY THYROID STIM HORMONE: CPT

## 2024-07-09 PROCEDURE — 84439 ASSAY OF FREE THYROXINE: CPT

## 2024-07-09 PROCEDURE — 36415 COLL VENOUS BLD VENIPUNCTURE: CPT

## 2024-08-05 ENCOUNTER — TELEPHONE (OUTPATIENT)
Dept: ONCOLOGY | Facility: CLINIC | Age: 84
End: 2024-08-05
Payer: MEDICARE

## 2024-08-05 NOTE — TELEPHONE ENCOUNTER
Caller: ManuelitoJoel celayacy    Relationship: Emergency Contact    Best call back number: [phone]    What is the best time to reach you: ANYTIME    Who are you requesting to speak with (clinical staff, provider,  specific staff member): CLINICAL    What was the call regarding: DASHA (DAUGHTER) IS REQUESTING A CALL BACK TO FOLLOW UP WITH DR GIORDANO IN REGARDS TO THE CONVERSATION WITH DR BARAJAS

## 2024-08-05 NOTE — TELEPHONE ENCOUNTER
Called patient's daughter, let her know Dr. Wolf is not in the office today and that I would sent message to Dr. Wolf's nurse to follow up. Pt V/U

## 2024-08-07 NOTE — TELEPHONE ENCOUNTER
Discussed pt's case with Dr. Wolf. He reviewed Dr. Kong's notes from her visit in May. He has not spoken with Dr. Kong directly. Called pt's daughter and informed her they have not spoken. Daughter states pt is very anxious about having the parathyroidectomy. D/W Dr. Wolf again and he will contact pt about procedure.

## 2024-08-08 NOTE — TELEPHONE ENCOUNTER
Patient contacted about need for repeat examination by ENT.  This would help her understand whether the area in question has changed including growth or further invasion occurring.  She is going to likely follow-up with Dr. Brunson concerning this.  Thanks, BURT

## 2024-09-09 ENCOUNTER — LAB (OUTPATIENT)
Dept: LAB | Facility: HOSPITAL | Age: 84
End: 2024-09-09
Payer: MEDICARE

## 2024-09-09 ENCOUNTER — TRANSCRIBE ORDERS (OUTPATIENT)
Dept: LAB | Facility: HOSPITAL | Age: 84
End: 2024-09-09
Payer: MEDICARE

## 2024-09-09 DIAGNOSIS — G44.009 CLUSTER HEADACHE, NOT INTRACTABLE, UNSPECIFIED CHRONICITY PATTERN: ICD-10-CM

## 2024-09-09 DIAGNOSIS — G44.009 CLUSTER HEADACHE, NOT INTRACTABLE, UNSPECIFIED CHRONICITY PATTERN: Primary | ICD-10-CM

## 2024-09-09 LAB
CRP SERPL-MCNC: <0.3 MG/DL (ref 0–0.5)
ERYTHROCYTE [SEDIMENTATION RATE] IN BLOOD: 3 MM/HR (ref 0–30)

## 2024-09-09 PROCEDURE — 36415 COLL VENOUS BLD VENIPUNCTURE: CPT

## 2024-09-09 PROCEDURE — 85652 RBC SED RATE AUTOMATED: CPT

## 2024-09-09 PROCEDURE — 86140 C-REACTIVE PROTEIN: CPT

## 2024-09-26 ENCOUNTER — TRANSCRIBE ORDERS (OUTPATIENT)
Dept: ADMINISTRATIVE | Facility: HOSPITAL | Age: 84
End: 2024-09-26
Payer: MEDICARE

## 2024-09-26 DIAGNOSIS — D37.030 NEOPLASM OF UNCERTAIN BEHAVIOR OF PAROTID GLAND: Primary | ICD-10-CM

## 2024-10-03 ENCOUNTER — TELEPHONE (OUTPATIENT)
Dept: ONCOLOGY | Facility: CLINIC | Age: 84
End: 2024-10-03

## 2024-10-03 NOTE — TELEPHONE ENCOUNTER
Caller: Madisyn Plascencia    Relationship to patient: Self    Best call back number: [phone]     Chief complaint: PT NEEDS TO CANCEL HER APPT. THE PT IS GOING TO BE HAVING SURGERY ON THE 16 TH TO REMOVE THE TUMOR ON HER NECK    Type of visit: LAB AND FOLLOW UP    Requested date: PT DOES NOT WANT TO R/S AT THIS TIME, PT IS GOING TO WAIT TO SEE WHAT THEY TELL HER AFTER SURGERY.      If rescheduling, when is the original appointment: 10/15

## 2024-10-11 ENCOUNTER — TRANSCRIBE ORDERS (OUTPATIENT)
Dept: LAB | Facility: HOSPITAL | Age: 84
End: 2024-10-11
Payer: MEDICARE

## 2024-10-11 ENCOUNTER — LAB (OUTPATIENT)
Dept: LAB | Facility: HOSPITAL | Age: 84
End: 2024-10-11
Payer: MEDICARE

## 2024-10-11 DIAGNOSIS — R73.09 OTHER ABNORMAL GLUCOSE: ICD-10-CM

## 2024-10-11 DIAGNOSIS — M25.512 LEFT SHOULDER PAIN, UNSPECIFIED CHRONICITY: ICD-10-CM

## 2024-10-11 DIAGNOSIS — M25.111 FISTULA OF RIGHT SHOULDER: ICD-10-CM

## 2024-10-11 DIAGNOSIS — M25.111 FISTULA OF RIGHT SHOULDER: Primary | ICD-10-CM

## 2024-10-11 LAB
ALBUMIN SERPL-MCNC: 4.3 G/DL (ref 3.5–5.2)
ALBUMIN/GLOB SERPL: 1.7 G/DL
ALP SERPL-CCNC: 90 U/L (ref 39–117)
ALT SERPL W P-5'-P-CCNC: 17 U/L (ref 1–33)
ANION GAP SERPL CALCULATED.3IONS-SCNC: 10.9 MMOL/L (ref 5–15)
AST SERPL-CCNC: 18 U/L (ref 1–32)
BASOPHILS # BLD AUTO: 0.02 10*3/MM3 (ref 0–0.2)
BASOPHILS NFR BLD AUTO: 0.2 % (ref 0–1.5)
BILIRUB SERPL-MCNC: 0.4 MG/DL (ref 0–1.2)
BUN SERPL-MCNC: 31 MG/DL (ref 8–23)
BUN/CREAT SERPL: 31 (ref 7–25)
CALCIUM SPEC-SCNC: 10 MG/DL (ref 8.6–10.5)
CHLORIDE SERPL-SCNC: 101 MMOL/L (ref 98–107)
CO2 SERPL-SCNC: 26.1 MMOL/L (ref 22–29)
CREAT SERPL-MCNC: 1 MG/DL (ref 0.57–1)
DEPRECATED RDW RBC AUTO: 47.8 FL (ref 37–54)
EGFRCR SERPLBLD CKD-EPI 2021: 56 ML/MIN/1.73
EOSINOPHIL # BLD AUTO: 0.08 10*3/MM3 (ref 0–0.4)
EOSINOPHIL NFR BLD AUTO: 0.6 % (ref 0.3–6.2)
ERYTHROCYTE [DISTWIDTH] IN BLOOD BY AUTOMATED COUNT: 13.6 % (ref 12.3–15.4)
GLOBULIN UR ELPH-MCNC: 2.5 GM/DL
GLUCOSE SERPL-MCNC: 75 MG/DL (ref 65–99)
HBA1C MFR BLD: 6.1 % (ref 4.8–5.6)
HCT VFR BLD AUTO: 41.3 % (ref 34–46.6)
HGB BLD-MCNC: 13.5 G/DL (ref 12–15.9)
IMM GRANULOCYTES # BLD AUTO: 0.08 10*3/MM3 (ref 0–0.05)
IMM GRANULOCYTES NFR BLD AUTO: 0.6 % (ref 0–0.5)
LYMPHOCYTES # BLD AUTO: 1.99 10*3/MM3 (ref 0.7–3.1)
LYMPHOCYTES NFR BLD AUTO: 15.7 % (ref 19.6–45.3)
MCH RBC QN AUTO: 31.6 PG (ref 26.6–33)
MCHC RBC AUTO-ENTMCNC: 32.7 G/DL (ref 31.5–35.7)
MCV RBC AUTO: 96.7 FL (ref 79–97)
MONOCYTES # BLD AUTO: 0.97 10*3/MM3 (ref 0.1–0.9)
MONOCYTES NFR BLD AUTO: 7.7 % (ref 5–12)
NEUTROPHILS NFR BLD AUTO: 75.2 % (ref 42.7–76)
NEUTROPHILS NFR BLD AUTO: 9.52 10*3/MM3 (ref 1.7–7)
NRBC BLD AUTO-RTO: 0 /100 WBC (ref 0–0.2)
PLATELET # BLD AUTO: 254 10*3/MM3 (ref 140–450)
PMV BLD AUTO: 9.6 FL (ref 6–12)
POTASSIUM SERPL-SCNC: 4.4 MMOL/L (ref 3.5–5.2)
PROT SERPL-MCNC: 6.8 G/DL (ref 6–8.5)
RBC # BLD AUTO: 4.27 10*6/MM3 (ref 3.77–5.28)
SODIUM SERPL-SCNC: 138 MMOL/L (ref 136–145)
WBC NRBC COR # BLD AUTO: 12.66 10*3/MM3 (ref 3.4–10.8)

## 2024-10-11 PROCEDURE — 86480 TB TEST CELL IMMUN MEASURE: CPT

## 2024-10-11 PROCEDURE — 83036 HEMOGLOBIN GLYCOSYLATED A1C: CPT

## 2024-10-11 PROCEDURE — 85025 COMPLETE CBC W/AUTO DIFF WBC: CPT

## 2024-10-11 PROCEDURE — 36415 COLL VENOUS BLD VENIPUNCTURE: CPT

## 2024-10-11 PROCEDURE — 80053 COMPREHEN METABOLIC PANEL: CPT

## 2024-10-16 LAB
GAMMA INTERFERON BACKGROUND BLD IA-ACNC: 0.17 IU/ML
M TB IFN-G BLD-IMP: NEGATIVE
M TB IFN-G CD4+ BCKGRND COR BLD-ACNC: 0.09 IU/ML
M TB IFN-G CD4+CD8+ BCKGRND COR BLD-ACNC: 0.05 IU/ML
MITOGEN IGNF BCKGRD COR BLD-ACNC: >10 IU/ML
QUANTIFERON INCUBATION: NORMAL
SERVICE CMNT-IMP: NORMAL

## 2024-10-22 ENCOUNTER — HOSPITAL ENCOUNTER (OUTPATIENT)
Facility: HOSPITAL | Age: 84
Setting detail: OBSERVATION
Discharge: HOME | End: 2024-10-23
Attending: EMERGENCY MEDICINE | Admitting: EMERGENCY MEDICINE
Payer: MEDICARE

## 2024-10-22 ENCOUNTER — APPOINTMENT (OUTPATIENT)
Dept: GENERAL RADIOLOGY | Facility: HOSPITAL | Age: 84
End: 2024-10-22
Payer: MEDICARE

## 2024-10-22 DIAGNOSIS — R07.89 CHEST DISCOMFORT: Primary | ICD-10-CM

## 2024-10-22 DIAGNOSIS — E86.9 VOLUME DEPLETION: ICD-10-CM

## 2024-10-22 DIAGNOSIS — I10 ELEVATED BLOOD PRESSURE READING IN OFFICE WITH DIAGNOSIS OF HYPERTENSION: ICD-10-CM

## 2024-10-22 PROBLEM — R07.9 CHEST PAIN: Status: ACTIVE | Noted: 2024-10-22

## 2024-10-22 LAB
ALBUMIN SERPL-MCNC: 4.1 G/DL (ref 3.5–5.2)
ALBUMIN/GLOB SERPL: 1.5 G/DL
ALP SERPL-CCNC: 82 U/L (ref 39–117)
ALT SERPL W P-5'-P-CCNC: 26 U/L (ref 1–33)
ANION GAP SERPL CALCULATED.3IONS-SCNC: 10.6 MMOL/L (ref 5–15)
AST SERPL-CCNC: 22 U/L (ref 1–32)
BASOPHILS # BLD AUTO: 0.04 10*3/MM3 (ref 0–0.2)
BASOPHILS NFR BLD AUTO: 0.3 % (ref 0–1.5)
BILIRUB SERPL-MCNC: 0.5 MG/DL (ref 0–1.2)
BILIRUB UR QL STRIP: NEGATIVE
BUN SERPL-MCNC: 31 MG/DL (ref 8–23)
BUN/CREAT SERPL: 26.7 (ref 7–25)
CALCIUM SPEC-SCNC: 9.9 MG/DL (ref 8.6–10.5)
CHLORIDE SERPL-SCNC: 99 MMOL/L (ref 98–107)
CHOLEST SERPL-MCNC: 198 MG/DL (ref 0–200)
CLARITY UR: CLEAR
CO2 SERPL-SCNC: 23.4 MMOL/L (ref 22–29)
COLOR UR: YELLOW
CREAT SERPL-MCNC: 1.16 MG/DL (ref 0.57–1)
DEPRECATED RDW RBC AUTO: 46.1 FL (ref 37–54)
EGFRCR SERPLBLD CKD-EPI 2021: 46.9 ML/MIN/1.73
EOSINOPHIL # BLD AUTO: 0.33 10*3/MM3 (ref 0–0.4)
EOSINOPHIL NFR BLD AUTO: 2.3 % (ref 0.3–6.2)
ERYTHROCYTE [DISTWIDTH] IN BLOOD BY AUTOMATED COUNT: 13.4 % (ref 12.3–15.4)
GEN 5 2HR TROPONIN T REFLEX: 10 NG/L
GLOBULIN UR ELPH-MCNC: 2.8 GM/DL
GLUCOSE SERPL-MCNC: 95 MG/DL (ref 65–99)
GLUCOSE UR STRIP-MCNC: NEGATIVE MG/DL
HCT VFR BLD AUTO: 40.9 % (ref 34–46.6)
HDLC SERPL-MCNC: 72 MG/DL (ref 40–60)
HGB BLD-MCNC: 13.7 G/DL (ref 12–15.9)
HGB UR QL STRIP.AUTO: NEGATIVE
HOLD SPECIMEN: NORMAL
HOLD SPECIMEN: NORMAL
IMM GRANULOCYTES # BLD AUTO: 0.09 10*3/MM3 (ref 0–0.05)
IMM GRANULOCYTES NFR BLD AUTO: 0.6 % (ref 0–0.5)
KETONES UR QL STRIP: NEGATIVE
LDLC SERPL CALC-MCNC: 91 MG/DL (ref 0–100)
LDLC/HDLC SERPL: 1.16 {RATIO}
LEUKOCYTE ESTERASE UR QL STRIP.AUTO: NEGATIVE
LYMPHOCYTES # BLD AUTO: 2.7 10*3/MM3 (ref 0.7–3.1)
LYMPHOCYTES NFR BLD AUTO: 18.9 % (ref 19.6–45.3)
MCH RBC QN AUTO: 31.6 PG (ref 26.6–33)
MCHC RBC AUTO-ENTMCNC: 33.5 G/DL (ref 31.5–35.7)
MCV RBC AUTO: 94.2 FL (ref 79–97)
MONOCYTES # BLD AUTO: 0.94 10*3/MM3 (ref 0.1–0.9)
MONOCYTES NFR BLD AUTO: 6.6 % (ref 5–12)
NEUTROPHILS NFR BLD AUTO: 10.2 10*3/MM3 (ref 1.7–7)
NEUTROPHILS NFR BLD AUTO: 71.3 % (ref 42.7–76)
NITRITE UR QL STRIP: NEGATIVE
NRBC BLD AUTO-RTO: 0 /100 WBC (ref 0–0.2)
PH UR STRIP.AUTO: 6 [PH] (ref 5–8)
PLATELET # BLD AUTO: 226 10*3/MM3 (ref 140–450)
PMV BLD AUTO: 9.4 FL (ref 6–12)
POTASSIUM SERPL-SCNC: 4.2 MMOL/L (ref 3.5–5.2)
PROT SERPL-MCNC: 6.9 G/DL (ref 6–8.5)
PROT UR QL STRIP: NEGATIVE
RBC # BLD AUTO: 4.34 10*6/MM3 (ref 3.77–5.28)
SODIUM SERPL-SCNC: 133 MMOL/L (ref 136–145)
SP GR UR STRIP: 1.01 (ref 1–1.03)
TRIGL SERPL-MCNC: 214 MG/DL (ref 0–150)
TROPONIN T DELTA: -3 NG/L
TROPONIN T SERPL HS-MCNC: 13 NG/L
UROBILINOGEN UR QL STRIP: NORMAL
VLDLC SERPL-MCNC: 35 MG/DL (ref 5–40)
WBC NRBC COR # BLD AUTO: 14.3 10*3/MM3 (ref 3.4–10.8)
WHOLE BLOOD HOLD COAG: NORMAL
WHOLE BLOOD HOLD SPECIMEN: NORMAL

## 2024-10-22 PROCEDURE — G0378 HOSPITAL OBSERVATION PER HR: HCPCS

## 2024-10-22 PROCEDURE — 99285 EMERGENCY DEPT VISIT HI MDM: CPT

## 2024-10-22 PROCEDURE — 84484 ASSAY OF TROPONIN QUANT: CPT | Performed by: EMERGENCY MEDICINE

## 2024-10-22 PROCEDURE — 71045 X-RAY EXAM CHEST 1 VIEW: CPT

## 2024-10-22 PROCEDURE — 85025 COMPLETE CBC W/AUTO DIFF WBC: CPT

## 2024-10-22 PROCEDURE — 93005 ELECTROCARDIOGRAM TRACING: CPT | Performed by: EMERGENCY MEDICINE

## 2024-10-22 PROCEDURE — 93005 ELECTROCARDIOGRAM TRACING: CPT

## 2024-10-22 PROCEDURE — 81003 URINALYSIS AUTO W/O SCOPE: CPT | Performed by: PHYSICIAN ASSISTANT

## 2024-10-22 PROCEDURE — 36415 COLL VENOUS BLD VENIPUNCTURE: CPT

## 2024-10-22 PROCEDURE — 93010 ELECTROCARDIOGRAM REPORT: CPT | Performed by: INTERNAL MEDICINE

## 2024-10-22 PROCEDURE — 25810000003 SODIUM CHLORIDE 0.9 % SOLUTION: Performed by: EMERGENCY MEDICINE

## 2024-10-22 PROCEDURE — 80061 LIPID PANEL: CPT | Performed by: NURSE PRACTITIONER

## 2024-10-22 PROCEDURE — 80053 COMPREHEN METABOLIC PANEL: CPT | Performed by: EMERGENCY MEDICINE

## 2024-10-22 RX ORDER — POLYETHYLENE GLYCOL 3350 17 G/17G
0.5 POWDER, FOR SOLUTION ORAL DAILY PRN
Status: DISCONTINUED | OUTPATIENT
Start: 2024-10-22 | End: 2024-10-22

## 2024-10-22 RX ORDER — ZOLPIDEM TARTRATE 5 MG/1
5 TABLET ORAL NIGHTLY PRN
Status: DISCONTINUED | OUTPATIENT
Start: 2024-10-22 | End: 2024-10-23 | Stop reason: HOSPADM

## 2024-10-22 RX ORDER — POLYETHYLENE GLYCOL 3350 17 G/17G
17 POWDER, FOR SOLUTION ORAL DAILY
Status: DISCONTINUED | OUTPATIENT
Start: 2024-10-23 | End: 2024-10-23 | Stop reason: HOSPADM

## 2024-10-22 RX ORDER — LEVOTHYROXINE SODIUM 50 UG/1
100 TABLET ORAL
Status: DISCONTINUED | OUTPATIENT
Start: 2024-10-23 | End: 2024-10-23 | Stop reason: HOSPADM

## 2024-10-22 RX ORDER — SODIUM CHLORIDE 0.9 % (FLUSH) 0.9 %
10 SYRINGE (ML) INJECTION AS NEEDED
Status: DISCONTINUED | OUTPATIENT
Start: 2024-10-22 | End: 2024-10-23 | Stop reason: HOSPADM

## 2024-10-22 RX ORDER — NITROGLYCERIN 0.4 MG/1
0.4 TABLET SUBLINGUAL
Status: DISCONTINUED | OUTPATIENT
Start: 2024-10-22 | End: 2024-10-23 | Stop reason: HOSPADM

## 2024-10-22 RX ORDER — SODIUM CHLORIDE 0.9 % (FLUSH) 0.9 %
10 SYRINGE (ML) INJECTION EVERY 12 HOURS SCHEDULED
Status: DISCONTINUED | OUTPATIENT
Start: 2024-10-22 | End: 2024-10-23 | Stop reason: HOSPADM

## 2024-10-22 RX ORDER — POLYETHYLENE GLYCOL 3350 17 G/17G
1 POWDER, FOR SOLUTION ORAL DAILY
Status: DISCONTINUED | OUTPATIENT
Start: 2024-10-23 | End: 2024-10-22

## 2024-10-22 RX ORDER — DOCUSATE SODIUM 100 MG/1
100 CAPSULE, LIQUID FILLED ORAL 2 TIMES DAILY
COMMUNITY

## 2024-10-22 RX ORDER — CELECOXIB 200 MG/1
200 CAPSULE ORAL NIGHTLY
Status: DISCONTINUED | OUTPATIENT
Start: 2024-10-22 | End: 2024-10-23 | Stop reason: HOSPADM

## 2024-10-22 RX ORDER — DOCUSATE SODIUM 100 MG/1
100 CAPSULE, LIQUID FILLED ORAL 2 TIMES DAILY
Status: DISCONTINUED | OUTPATIENT
Start: 2024-10-22 | End: 2024-10-23 | Stop reason: HOSPADM

## 2024-10-22 RX ORDER — PREDNISONE 20 MG/1
20 TABLET ORAL DAILY
COMMUNITY

## 2024-10-22 RX ORDER — METOPROLOL TARTRATE 25 MG/1
25 TABLET, FILM COATED ORAL ONCE
Status: COMPLETED | OUTPATIENT
Start: 2024-10-22 | End: 2024-10-22

## 2024-10-22 RX ORDER — PREDNISONE 20 MG/1
20 TABLET ORAL DAILY
Status: DISCONTINUED | OUTPATIENT
Start: 2024-10-23 | End: 2024-10-23 | Stop reason: HOSPADM

## 2024-10-22 RX ORDER — PANTOPRAZOLE SODIUM 40 MG/1
40 TABLET, DELAYED RELEASE ORAL 2 TIMES DAILY
Status: DISCONTINUED | OUTPATIENT
Start: 2024-10-22 | End: 2024-10-23 | Stop reason: HOSPADM

## 2024-10-22 RX ORDER — ATORVASTATIN CALCIUM 10 MG/1
10 TABLET, FILM COATED ORAL EVERY OTHER DAY
Status: DISCONTINUED | OUTPATIENT
Start: 2024-10-22 | End: 2024-10-23 | Stop reason: HOSPADM

## 2024-10-22 RX ORDER — ASPIRIN 325 MG
325 TABLET ORAL ONCE
Status: COMPLETED | OUTPATIENT
Start: 2024-10-22 | End: 2024-10-22

## 2024-10-22 RX ADMIN — Medication 10 ML: at 21:56

## 2024-10-22 RX ADMIN — METOPROLOL TARTRATE 25 MG: 25 TABLET, FILM COATED ORAL at 17:50

## 2024-10-22 RX ADMIN — DOCUSATE SODIUM 100 MG: 100 CAPSULE, LIQUID FILLED ORAL at 21:57

## 2024-10-22 RX ADMIN — ZOLPIDEM TARTRATE 5 MG: 5 TABLET, FILM COATED ORAL at 21:57

## 2024-10-22 RX ADMIN — ATORVASTATIN CALCIUM 10 MG: 10 TABLET, FILM COATED ORAL at 21:56

## 2024-10-22 RX ADMIN — ASPIRIN 325 MG: 325 TABLET ORAL at 17:50

## 2024-10-22 RX ADMIN — CELECOXIB 200 MG: 200 CAPSULE ORAL at 23:11

## 2024-10-22 RX ADMIN — SODIUM CHLORIDE 500 ML: 9 INJECTION, SOLUTION INTRAVENOUS at 17:59

## 2024-10-22 NOTE — ED PROVIDER NOTES
EMERGENCY DEPARTMENT ENCOUNTER  Room Number:  23/23  PCP: Katarzyna Bland MD  Independent Historians: Patient and daughter      HPI:  Chief Complaint: had concerns including Chest Pain.     A complete HPI/ROS/PMH/PSH/SH/FH are unobtainable due to: None    Chronic or social conditions impacting patient care (Social Determinants of Health): None      Context: Madisyn Plascencia is a 83 y.o. 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  Patient reports around 10:00 last night she had several minutes of left-sided chest pressure that came on gradually and then resolved.  No associated shortness of breath diaphoresis or nausea.  She had a similar episode again around 1300 today that is now resolved.  Discomfort seems to radiate into her back to some degree.  No clear exacerbating relieving factors identified.      Review of prior external notes (non-ED) -and- Review of prior external test results outside of this encounter:  No recent cardiac testing identified in chart other than echocardiogram from last year which showed normal EF      PAST MEDICAL HISTORY  Active Ambulatory Problems     Diagnosis Date Noted    Primary osteoarthritis of left knee 10/07/2022    Parotid mass 08/08/2023    Hypoxia 09/01/2023    Wheezing 09/01/2023    Recent URI 09/01/2023    Hypothyroidism (acquired) 09/01/2023    Gastroesophageal reflux disease 09/01/2023    Essential hypertension 09/01/2023    Type 2 diabetes mellitus with hyperglycemia 09/03/2023    Anxiety 10/06/2023    Grief 10/06/2023    Tremor 10/06/2023     Resolved Ambulatory Problems     Diagnosis Date Noted    No Resolved Ambulatory Problems     Past Medical History:   Diagnosis Date    Arthritis     C. difficile colitis     Difficulty swallowing pills     Disease of thyroid gland     Diverticulitis     GERD  (gastroesophageal reflux disease)     H/O uterine prolapse     History of esophageal dilatation     Hyperlipidemia     Hypertension     Hypothyroidism     Knee swelling     Osteopenia     Periarthritis of shoulder 2022    Prediabetes     Recent weight loss     Rotator cuff syndrome 2022    Scoliosis     Shoulder injury     Tear of meniscus of knee     Uterine prolapse          PAST SURGICAL HISTORY  Past Surgical History:   Procedure Laterality Date    CHOLECYSTECTOMY      COLONOSCOPY      ENDOSCOPY      ENDOSCOPY N/A 2021    Procedure: ESOPHAGOGASTRODUODENOSCOPY WITH 52 Taiwanese BRADFORD DILATION;  Surgeon: Tripp Pascal MD;  Location: [facility] ENDOSCOPY;  Service: Gastroenterology;  Laterality: N/A;  PRE- DYSPHAGIA  POST- ESOPHAGEAL RING    ENDOSCOPY N/A 11/3/2023    Procedure: ESOPHAGOGASTRODUODENOSCOPY with biopsy and bradford dialation;  Surgeon: Tripp Pascal MD;  Location: [facility] ENDOSCOPY;  Service: Gastroenterology;  Laterality: N/A;  pre- dysphagia   post-    JOINT REPLACEMENT Right     KNEE    TONSILLECTOMY      TOTAL KNEE ARTHROPLASTY Left 2022    Procedure: LEFT TOTAL KNEE ARTHROPLASTY;  Surgeon: Antonio Riggins MD;  [place] [facility] [place] [facility] [place]  Service: [place]opedics;  Laterality: Left;         FAMILY HISTORY  Family History   Problem Relation Age of Onset    Cancer Mother          1964    Early death Mother     Heart attack Father     Heart disease Father     Migraines Daughter     Malig Hyperthermia Neg Hx          SOCIAL HISTORY  Social History     Socioeconomic History    Marital status: Single   Tobacco Use    Smoking status: Never     Passive exposure: Never    Smokeless tobacco: Never    Tobacco comments:     Tried in college... didn't last   Vaping Use    Vaping status: Never Used   Substance and Sexual Activity    Alcohol use: Not Currently    Drug use: Never    Sexual activity: Not Currently     Partners: Male     Birth control/protection: Pill, Diaphragm,  Birth control pill     Comment: I’m 82… N/A         ALLERGIES  Sulfa antibiotics      REVIEW OF SYSTEMS  Review of Systems  Included in HPI  All systems reviewed and negative except for those discussed in HPI.      PHYSICAL EXAM    I have reviewed the triage vital signs and nursing notes.    ED Triage Vitals   Temp Heart Rate Resp BP SpO2   10/22/24 1630 10/22/24 1630 10/22/24 1630 10/22/24 1638 10/22/24 1630   98.6 °F (37 °C) 97 18 (!) 175/112 94 %      Temp src Heart Rate Source Patient Position BP Location FiO2 (%)   10/22/24 1630 10/22/24 1638 10/22/24 1638 10/22/24 1638 --   Tympanic Monitor Sitting Right arm        Physical Exam    Physical Exam   Constitutional: No distress.  Nontoxic  HENT:  Head: Normocephalic and atraumatic.   Oropharynx: Mucous membranes are moist.   Eyes: . No scleral icterus. No conjunctival pallor.  Neck: Normal range of motion. Neck supple.  Healing surgical incision site right neck  Cardiovascular: Pink warm and well perfused throughout.    Pulmonary/Chest: No respiratory distress.  No tachypnea or increased work of breathing appreciated.  Clear to auscultation  Abdominal: Soft. There is no tenderness. There is no rebound and no guarding.   Musculoskeletal: Moves all extremities equally.  No calf tenderness  Neurological: Alert and oriented.  No acute focal deficit appreciated.  Skin: Skin is pink, warm, and dry.   Psychiatric: Mood and affect normal.   Nursing note and vitals reviewed.             LAB RESULTS  Recent Results (from the past 24 hours)   ECG 12 Lead ED Triage Standing Order; Chest Pain    Collection Time: 10/22/24  4:34 PM   Result Value Ref Range    QT Interval 378 ms    QTC Interval 447 ms   Comprehensive Metabolic Panel    Collection Time: 10/22/24  4:35 PM    Specimen: Arm, Left; Blood   Result Value Ref Range    Glucose 95 65 - 99 mg/dL    BUN 31 (H) 8 - 23 mg/dL    Creatinine 1.16 (H) 0.57 - 1.00 mg/dL    Sodium 133 (L) 136 - 145 mmol/L    Potassium 4.2 3.5 - 5.2  mmol/L    Chloride 99 98 - 107 mmol/L    CO2 23.4 22.0 - 29.0 mmol/L    Calcium 9.9 8.6 - 10.5 mg/dL    Total Protein 6.9 6.0 - 8.5 g/dL    Albumin 4.1 3.5 - 5.2 g/dL    ALT (SGPT) 26 1 - 33 U/L    AST (SGOT) 22 1 - 32 U/L    Alkaline Phosphatase 82 39 - 117 U/L    Total Bilirubin 0.5 0.0 - 1.2 mg/dL    Globulin 2.8 gm/dL    A/G Ratio 1.5 g/dL    BUN/Creatinine Ratio 26.7 (H) 7.0 - 25.0    Anion Gap 10.6 5.0 - 15.0 mmol/L    eGFR 46.9 (L) >60.0 mL/min/1.73   High Sensitivity Troponin T    Collection Time: 10/22/24  4:35 PM    Specimen: Arm, Left; Blood   Result Value Ref Range    HS Troponin T 13 <14 ng/L   Green Top (Gel)    Collection Time: 10/22/24  4:35 PM   Result Value Ref Range    Extra Tube Hold for add-ons.    Lavender Top    Collection Time: 10/22/24  4:35 PM   Result Value Ref Range    Extra Tube hold for add-on    Gold Top - SST    Collection Time: 10/22/24  4:35 PM   Result Value Ref Range    Extra Tube Hold for add-ons.    Light Blue Top    Collection Time: 10/22/24  4:35 PM   Result Value Ref Range    Extra Tube Hold for add-ons.    CBC Auto Differential    Collection Time: 10/22/24  4:35 PM    Specimen: Arm, Left; Blood   Result Value Ref Range    WBC 14.30 (H) 3.40 - 10.80 10*3/mm3    RBC 4.34 3.77 - 5.28 10*6/mm3    Hemoglobin 13.7 12.0 - 15.9 g/dL    Hematocrit 40.9 34.0 - 46.6 %    MCV 94.2 79.0 - 97.0 fL    MCH 31.6 26.6 - 33.0 pg    MCHC 33.5 31.5 - 35.7 g/dL    RDW 13.4 12.3 - 15.4 %    RDW-SD 46.1 37.0 - 54.0 fl    MPV 9.4 6.0 - 12.0 fL    Platelets 226 140 - 450 10*3/mm3    Neutrophil % 71.3 42.7 - 76.0 %    Lymphocyte % 18.9 (L) 19.6 - 45.3 %    Monocyte % 6.6 5.0 - 12.0 %    Eosinophil % 2.3 0.3 - 6.2 %    Basophil % 0.3 0.0 - 1.5 %    Immature Grans % 0.6 (H) 0.0 - 0.5 %    Neutrophils, Absolute 10.20 (H) 1.70 - 7.00 10*3/mm3    Lymphocytes, Absolute 2.70 0.70 - 3.10 10*3/mm3    Monocytes, Absolute 0.94 (H) 0.10 - 0.90 10*3/mm3    Eosinophils, Absolute 0.33 0.00 - 0.40 10*3/mm3     Basophils, Absolute 0.04 0.00 - 0.20 10*3/mm3    Immature Grans, Absolute 0.09 (H) 0.00 - 0.05 10*3/mm3    nRBC 0.0 0.0 - 0.2 /100 WBC         RADIOLOGY  XR Chest 1 View    Result Date: 10/22/2024  AP CHEST  HISTORY: Chest pain  COMPARISON: 12/8/2023  FINDINGS: There is mild atelectasis in the lung bases. Calcified granuloma left lung. No appreciable pneumothorax. Heart size stable. No acute bony abnormality.      Mild atelectasis in the lung bases   This report was finalized on 10/22/2024 5:20 PM by Dr. Kishore Mo M.D on Workstation: [id number]         MEDICATIONS GIVEN IN ER  Medications   sodium chloride 0.9 % flush 10 mL (has no administration in time range)   sodium chloride 0.9 % flush 10 mL (has no administration in time range)   sodium chloride 0.9 % bolus 500 mL (has no administration in time range)   aspirin tablet 325 mg (325 mg Oral Given 10/22/24 1750)   metoprolol tartrate (LOPRESSOR) tablet 25 mg (25 mg Oral Given 10/22/24 1750)         ORDERS PLACED DURING THIS VISIT:  Orders Placed This Encounter   Procedures    XR Chest 1 View    [place] Draw    Comprehensive Metabolic Panel    High Sensitivity Troponin T    CBC Auto Differential    High Sensitivity Troponin T 2Hr    NPO Diet NPO Type: Strict NPO    Undress & Gown    Continuous Pulse Oximetry    Oxygen Therapy- Nasal Cannula; Titrate 1-6 LPM Per SpO2; 90 - 95%    ECG 12 Lead ED Triage Standing Order; Chest Pain    ECG 12 Lead ED Triage Standing Order; Chest Pain    Insert Peripheral IV    Insert Peripheral IV    CBC & Differential    Green Top (Gel)    Lavender Top    Gold Top - SST    Light Blue Top         OUTPATIENT MEDICATION MANAGEMENT:  Current Facility-Administered Medications Ordered in Epic   Medication Dose Route Frequency Provider Last Rate Last Admin    sodium chloride 0.9 % bolus 500 mL  500 mL Intravenous Once Emmanuel Mo MD        sodium chloride 0.9 % flush 10 mL  10 mL Intravenous PRN Emmanuel Mo MD        sodium  chloride 0.9 % flush 10 mL  10 mL Intravenous PRN Emmanuel Mo MD         Current Outpatient Medications Ordered in Epic   Medication Sig Dispense Refill    atorvastatin (LIPITOR) 10 MG tablet Take 1 tablet by mouth Every Other Day. TAKES NIGHTLY      busPIRone (BUSPAR) 5 MG tablet Take 1 tablet by mouth 2 (Two) Times a Day. (Patient not taking: Reported on 6/25/2024)      celecoxib (CeleBREX) 200 MG capsule Take 1 capsule by mouth Every Night.      Cholecalciferol (VITAMIN D3) 5000 UNITS capsule capsule Take 1,000 Units by mouth Daily.      pantoprazole (PROTONIX) 40 MG EC tablet Take 1 tablet by mouth 2 (Two) Times a Day. (Patient taking differently: Take 1 tablet by mouth 2 (Two) Times a Day. PATIENT IS OUT OF THIS MEDICINE AND NEEDS A REFILL) 60 tablet 0    polyethylene glycol (MIRALAX) 17 GM/SCOOP powder Take 17 g by mouth Daily.      Synthroid 100 MCG tablet Take 1 tablet by mouth Daily.      vitamin C (ASCORBIC ACID) 500 MG tablet Take 1 tablet by mouth Daily.      zolpidem (AMBIEN) 5 MG tablet Take 1 tablet by mouth At Night As Needed for Sleep.           PROCEDURES  Procedures            PROGRESS, DATA ANALYSIS, CONSULTS, AND MEDICAL DECISION MAKING  All labs have been independently interpreted by me.  All radiology studies have been reviewed by me. All EKG's have been independently viewed and interpreted by me.  Discussion below represents my analysis of pertinent findings related to patient's condition, differential diagnosis, treatment plan and final disposition.    Differential diagnosis:   My differential diagnosis for chest pain includes but is not limited to:  Muscle strain, costochondritis, myositis, pleurisy, rib fracture, intercostal neuritis, herpes zoster, tumor, myocardial infarction, coronary syndrome, unstable angina, angina, aortic dissection, mitral valve prolapse, pericarditis, palpitations, pulmonary embolus, pneumonia, pneumothorax, lung cancer, GERD, esophagitis, esophageal  spasm      Clinical Scores: HEART Score: 5                  ED Course as of 10/22/24 1751   Zaine Oct 22, 2024   1705 EKG           EKG time: 1634  Rhythm/Rate: Sinus, 85  P waves and OK: SUSANA within normal limits  QRS, axis: Narrow complex  ST and T waves: No STEMI; QTc within normal limits    Interpreted Contemporaneously by me, independently viewed  Comparison: Not currently available   [RS]   1706 WBC(!): 14.30 [RS]   1706 Hemoglobin: 13.7 [RS]   1706 Immature Grans, Absolute(!): 0.09 [RS]   1712 HS Troponin T: 13 [RS]   1712 BUN(!): 31 [RS]   1712 Creatinine(!): 1.16 [RS]   1712 Sodium(!): 133 [RS]   1712 Potassium: 4.2 [RS]   1712 ALT (SGPT): 26 [RS]   1712 AST (SGOT): 22 [RS]   1712 Total Bilirubin: 0.5 [RS]   1747 Reviewed all findings with patient and daughter.  Discussed concerns.  Heart score 5.  Recommend the ED observation unit for further evaluation treatment.  Ultimately after discussion of options, all agreeable with plan. [RS]   1750 CONSULT        Provider: DAVID CABEZAS    Discussion: Reviewed patient history, ED presentation evaluation as well as therapies initiated the ED.  Agreeable to accept patient for ED observation unit.    Agreeable c treatment and planned disposition.         [RS]      ED Course User Index  [RS] Emmanuel Mo MD         Prescription drug monitoring program review:     AS OF 17:51 EDT VITALS:    BP - 144/85  HR - 85  TEMP - 98.6 °F (37 °C) (Tympanic)  O2 SATS - 95%    COMPLEXITY OF CARE  The patient requires admission.      DIAGNOSIS  Final diagnoses:   Chest discomfort   Volume depletion   Elevated blood pressure reading in office with diagnosis of hypertension         DISPOSITION  ED Disposition       ED Disposition   Decision to Admit    Condition   --    Comment   --                  ADMISSION    Discussed treatment plan and reason for admission with pt/family and admitting physician.  Pt/family voiced understanding of the plan for admission for further  testing/treatment as needed.       Please note that portions of this document were completed with a voice recognition program.    Note Disclaimer: At [facility], we believe that sharing information builds trust and better relationships. You are receiving this note because you recently visited [facility]. It is possible you will see health information before a provider has talked with you about it. This kind of information can be easy to misunderstand. To help you fully understand what it means for your health, we urge you to discuss this note with your provider.         Emmanuel Mo MD  10/22/24 5440

## 2024-10-22 NOTE — H&P
[facility]   HISTORY AND PHYSICAL    Patient Name: Madisyn Plascencia  : 1940  MRN: [id number]  Primary Care Physician:  Katarzyna Bland MD  Date of admission: 10/22/2024    Subjective   Subjective     Chief Complaint:   Chief Complaint   Patient presents with    Chest Pain         HPI:    Madisyn Plascencia is a 83 y.o. female comes in complaining of chest pain since yesterday evening.  Patient states she went to lie down for bed around 10 PM when she had a pressure-like feeling in the right side of her chest.  Patient states it happened again at 1 PM today on the right side of the chest and will somewhat wrap around to her side into her back.  Patient otherwise denies any cough, fever, chills, vomiting.  Patient does report some mild nausea with the symptoms but denies any nausea currently.  Patient denies any urinary symptoms or diarrhea.  Patient states that she is currently on a prednisone course for arthritis of her shoulders.  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  Patient states her chest pain is not worse after eating food and denies any exertional symptoms as patient was laying down when her pain initially started.  Patient states that her episodes of pain last about 5 minutes.    In the ED, initial troponin 13, repeat troponin of 10.  Serum creatinine 1.1 was previously 1.0, 11 days ago.  GFR of 46.  Lipid panel shows mildly elevated HDL and triglycerides.  White blood cell count of 14.3 otherwise unremarkable CBC.  Last white blood cell count of 12.6 about 11 days ago.  Chest x-ray shows mild atelectasis in the lung bases.  EKG shows sinus rhythm 84 bpm, no ST elevation apparent.  Patient is afebrile, pulse in the 70s, on room air oxygen 96% SpO2 and blood pressure 170s over 90s.  Patient given full dose aspirin in ED, Lopressor 25 mg p.o. and 500 normal saline bolus in ED.    Review of Systems   All systems were reviewed and negative  except for: as per HPI    Personal History     Past Medical History:   Diagnosis Date    Anxiety     Arthritis     C. difficile colitis     Difficulty swallowing pills     Disease of thyroid gland     Diverticulitis     GERD (gastroesophageal reflux disease)     H/O uterine prolapse     History of esophageal dilatation     Hyperlipidemia     Hypertension     Hypothyroidism     Knee swelling 2011    Had right knee replaced first … had to choose which knee first    Osteopenia     Periarthritis of shoulder 9/26/2022    Dr. Hamilton visit    Prediabetes     Recent weight loss     Rotator cuff syndrome 9-    Dr. Hamilton diagnosed on first visit…mild    Scoliosis     Shoulder injury     Tear of meniscus of knee 2011    Had both knees replaced 2011/2022    Tremor 10/06/2023    Type 2 diabetes mellitus with hyperglycemia 09/03/2023    Uterine prolapse        Past Surgical History:   Procedure Laterality Date    CHOLECYSTECTOMY      COLONOSCOPY      ENDOSCOPY      ENDOSCOPY N/A 05/25/2021    Procedure: ESOPHAGOGASTRODUODENOSCOPY WITH 52 Malagasy BRADFORD DILATION;  Surgeon: Tripp Pascal MD;  Location: [facility] ENDOSCOPY;  Service: Gastroenterology;  Laterality: N/A;  PRE- DYSPHAGIA  POST- ESOPHAGEAL RING    ENDOSCOPY N/A 11/3/2023    Procedure: ESOPHAGOGASTRODUODENOSCOPY with biopsy and bradford dialation;  Surgeon: Tripp Pascal MD;  Location: [facility] ENDOSCOPY;  Service: Gastroenterology;  Laterality: N/A;  pre- dysphagia   post-    JOINT REPLACEMENT Right     KNEE    TONSILLECTOMY      TOTAL KNEE ARTHROPLASTY Left 11/22/2022    Procedure: LEFT TOTAL KNEE ARTHROPLASTY;  Surgeon: Antonio Riggins MD;  [place] [facility] OR [facility] [place];  Service: Orthopedics;  Laterality: Left;       Family History: family history includes Cancer in her mother; Early death in her mother; Heart attack in her father; Heart disease in her father; Migraines in her daughter. Otherwise pertinent FHx was reviewed and not pertinent to current  issue.    Social History:  reports that she has never smoked. She has never been exposed to tobacco smoke. She has never used smokeless tobacco. She reports that she does not currently use alcohol. She reports that she does not use drugs.    Home Medications:  atorvastatin, celecoxib, docusate sodium, levothyroxine, pantoprazole, polyethylene glycol, predniSONE, vitamin C, vitamin D3, and zolpidem    Allergies:  Allergies   Allergen Reactions    Sulfa Antibiotics Itching     Topical       Objective   Objective     Vitals:   Temp:  [98.2 °F (36.8 °C)-98.6 °F (37 °C)] 98.2 °F (36.8 °C)  Heart Rate:  [74-97] 74  Resp:  [16-18] 16  BP: (144-176)/() 176/91  Physical Exam    Constitutional: Awake, alert   Eyes: PERRLA, sclerae anicteric, no conjunctival injection   HENT: NCAT, mucous membranes moist   Neck: Supple, no thyromegaly, no lymphadenopathy, trachea midline   Respiratory: Clear to auscultation bilaterally, nonlabored respirations    Cardiovascular: RRR, no murmurs, rubs, or gallops, palpable pedal pulses bilaterally   Gastrointestinal: Positive bowel sounds, soft, nontender, nondistended   Musculoskeletal: No bilateral ankle edema, no clubbing or cyanosis to extremities   Psychiatric: Appropriate affect, cooperative   Neurologic: Oriented x 3, strength symmetric in all extremities, Cranial Nerves grossly intact to confrontation, speech clear   Skin: No rashes.  SHARI drain in place with scant amount serosanguineous fluid in the drain.  Wound site of right parotid area has postoperative changes, no obvious erythema, fluctuance or drainage at this time.    Result Review    Result Review:  I have personally reviewed the results from the time of this admission to 10/22/2024 21:30 EDT and agree with these findings:  [x]  Laboratory list / accordion  []  Microbiology  [x]  Radiology  [x]  EKG/Telemetry   []  Cardiology/Vascular   []  Pathology  []  Old records  []  Other:  Most notable findings include: see  above      Assessment & Plan   Assessment / Plan     Brief Patient Summary:  Madisyn Plascencia is a 83 y.o. female who comes in complaining of chest pain.    Active Hospital Problems:  Active Hospital Problems    Diagnosis     **Chest pain      Plan:     Chest pain  - initial troponin 13, repeat troponin of 10.    -Serum creatinine 1.1 was previously 1.0, 11 days ago.  GFR of 46.    -Chest x-ray shows mild atelectasis in the lung bases.   - EKG shows sinus rhythm 84 bpm, no ST elevation apparent.  Patient is afebrile, pulse in the 70s, on room air oxygen 96% SpO2 and blood pressure 170s over 90s.    -Patient given full dose aspirin in ED, Lopressor 25 mg p.o. and 500 normal saline bolus in ED.  -Cardiology consult  -Repeat EKG, troponin  -Continuous cardiac monitoring  -Heart healthy diet n.p.o. midnight    Mild leukocytosis  -Likely secondary to steroids and reactive from postoperative state, no fever or infectious symptoms at this time  - White blood cell count of 14.3 otherwise unremarkable CBC.  Last white blood cell count of 12.6 about 11 days ago.    -Chest x-ray shows mild atelectasis in the lung bases.   -UA negative    Arthritis of shoulders bilaterally  -Currently on prednisone, continue Celebrex    Hypothyroidism  -Check TSH, Synthroid    Hyperlipidemia  -Lipid panel shows mildly elevated HDL and triglycerides.   -Continue home statin    Anxiety/depression  -Continue home BuSpar    GERD  -Continue home PPI    VTE Prophylaxis: SCDs          CODE STATUS:       Admission Status:  I believe this patient meets observation status.    77 minutes have been spent by [facility] providers in the care of this patient while under observation status.      Appropriate PPE worn during patient encounter.  Hand hygeine performed before and after seeing the patient.      Electronically signed by DAVID Sevilla, 10/22/24, 7:20 PM EDT.

## 2024-10-22 NOTE — ED NOTES
Nursing report ED to floor  Madisyn Plascencia  83 y.o.  female    HPI :  HPI  Stated Reason for Visit: R sided chest pressure radiating to back last night, has since resolved  History Obtained From: patient    Chief Complaint  Chief Complaint   Patient presents with    Chest Pain       Admitting doctor:   Naga Snyder MD    Admitting diagnosis:   The primary encounter diagnosis was Chest discomfort. Diagnoses of Volume depletion and Elevated blood pressure reading in office with diagnosis of hypertension were also pertinent to this visit.    Code status:   Current Code Status       Date Active Code Status Order ID Comments User Context       Prior            Allergies:   Sulfa antibiotics    Isolation:   No active isolations    Intake and Output  No intake or output data in the 24 hours ending 10/22/24 1804    Weight:   There were no vitals filed for this visit.    Most recent vitals:   Vitals:    10/22/24 1650 10/22/24 1651 10/22/24 1701 10/22/24 1751   BP:   144/85    BP Location:       Patient Position:       Pulse: 86 87 85 84   Resp:       Temp:       TempSrc:       SpO2: 95% 95% 95%        Active LDAs/IV Access:   Lines, Drains & Airways       Active LDAs       Name Placement date Placement time Site Days    Peripheral IV 10/22/24 1758 Right Wrist 10/22/24  1758  Wrist  less than 1                    Labs (abnormal labs have a star):   Labs Reviewed   COMPREHENSIVE METABOLIC PANEL - Abnormal; Notable for the following components:       Result Value    BUN 31 (*)     Creatinine 1.16 (*)     Sodium 133 (*)     BUN/Creatinine Ratio 26.7 (*)     eGFR 46.9 (*)     All other components within normal limits    Narrative:     GFR Normal >60  Chronic Kidney Disease <60  Kidney Failure <15    The GFR formula is only valid for adults with stable renal function between ages 18 and 70.   CBC WITH AUTO DIFFERENTIAL - Abnormal; Notable for the following components:    WBC 14.30 (*)     Lymphocyte % 18.9 (*)     Immature  Grans % 0.6 (*)     Neutrophils, Absolute 10.20 (*)     Monocytes, Absolute 0.94 (*)     Immature Grans, Absolute 0.09 (*)     All other components within normal limits   TROPONIN - Normal    Narrative:     High Sensitive Troponin T Reference Range:  <14.0 ng/L- Negative Female for AMI  <22.0 ng/L- Negative Male for AMI  >=14 - Abnormal Female indicating possible myocardial injury.  >=22 - Abnormal Male indicating possible myocardial injury.   Clinicians would have to utilize clinical acumen, EKG, Troponin, and serial changes to determine if it is an Acute Myocardial Infarction or myocardial injury due to an underlying chronic condition.        RAINBOW DRAW    Narrative:     The following orders were created for panel order [place] Draw.  Procedure                               Abnormality         Status                     ---------                               -----------         ------                     Green Top (Gel)[[id number]]                                  Final result               Lavender Top[[id number]]                                     Final result               Gold Top - SST[[id number]]                                   Final result               Light Blue Top[[id number]]                                   Final result                 Please view results for these tests on the individual orders.   HIGH SENSITIVITIY TROPONIN T 2HR   LIPID PANEL   CBC AND DIFFERENTIAL    Narrative:     The following orders were created for panel order CBC & Differential.  Procedure                               Abnormality         Status                     ---------                               -----------         ------                     CBC Auto Differential[[id number]]        Abnormal            Final result                 Please view results for these tests on the individual orders.   GREEN TOP   LAVENDER TOP   GOLD TOP - SST   LIGHT BLUE TOP       EKG:   ECG 12 Lead ED Triage Standing Order; Chest Pain    Preliminary Result   HEART RATE=84  bpm   RR Eqmzwkts=175  ms   WY Interval=160  ms   P Horizontal Axis=11  deg   P Front Axis=34  deg   QRSD Interval=98  ms   QT Zhentufc=514  ms   RCyM=666  ms   QRS Axis=23  deg   T Wave Axis=10  deg   - NORMAL ECG -   Sinus rhythm   Date and Time of Study:2024-10-22 16:34:27          Meds given in ED:   Medications   sodium chloride 0.9 % flush 10 mL (has no administration in time range)   sodium chloride 0.9 % flush 10 mL (has no administration in time range)   sodium chloride 0.9 % bolus 500 mL (500 mL Intravenous New Bag 10/22/24 1759)   nitroglycerin (NITROSTAT) SL tablet 0.4 mg (has no administration in time range)   sodium chloride 0.9 % flush 10 mL (has no administration in time range)   sodium chloride 0.9 % flush 10 mL (has no administration in time range)   aspirin tablet 325 mg (325 mg Oral Given 10/22/24 1750)   metoprolol tartrate (LOPRESSOR) tablet 25 mg (25 mg Oral Given 10/22/24 1750)       Imaging results:  XR Chest 1 View    Result Date: 10/22/2024  Mild atelectasis in the lung bases   This report was finalized on 10/22/2024 5:20 PM by Dr. Kishore Mo M.D on Workstation: [id number]       Ambulatory status:   - adlib    Social issues:   Social History     Socioeconomic History    Marital status: Single   Tobacco Use    Smoking status: Never     Passive exposure: Never    Smokeless tobacco: Never    Tobacco comments:     Tried in college... didn't last   Vaping Use    Vaping status: Never Used   Substance and Sexual Activity    Alcohol use: Not Currently    Drug use: Never    Sexual activity: Not Currently     Partners: Male     Birth control/protection: Pill, Diaphragm, Birth control pill     Comment: I’m 82… N/A       Peripheral Neurovascular  Peripheral Neurovascular (Adult)  Peripheral Neurovascular WDL: WDL    Neuro Cognitive  Neuro Cognitive (Adult)  Cognitive/Neuro/Behavioral WDL: WDL, all  Level of Consciousness: Alert  Arousal Level: opens eyes  spontaneously  Orientation: oriented x 4  Speech: spontaneous, clear  Mood/Behavior: calm, cooperative    Learning  Learning Assessment  Learning Readiness and Ability: no barriers identified    Respiratory  Respiratory WDL  Respiratory WDL: WDL    Abdominal Pain       Pain Assessments  Pain (Adult)  (0-10) Pain Rating: Rest: 0    NIH Stroke Scale       Abbey Nicolas RN  10/22/24 18:04 EDT

## 2024-10-22 NOTE — PROGRESS NOTES
Patient Care Team:  Katarzyna Bland MD as PCP - General (Internal Medicine)  Og Wolf MD as Consulting Physician (Hematology and Oncology)  Katarzyna Bland MD as Referring Physician (Internal Medicine)     JOCELYNN PAEZ H&P Note    I supervised care provided by the midlevel provider. We have discussed this patient's history, physical exam, and treatment plan. I have reviewed the midlevel provider's note and I agree with the midlevel provider's findings and plan of care.   SHARED VISIT: This visit was performed by BOTH a physician and an APC. The substantive portion of the medical decision making was performed by this attesting physician who made or approved the management plan and takes responsibility for patient management.   I have personally had a face to face encounter with the patient.   My personal findings are documented below:    History:  83-year-old female with history of hypertension hyperlipidemia presents with chest pain.  Patient had episode of chest pain last night as well as another episode today, but described as right-sided chest pressure, radiated to the back, no radiation to neck or extremities, no associated shortness of breath, patient reports she did feel nauseous with her, no emesis, no diaphoresis.  Patient denies any cardiac history, reports remote history of a treadmill stress test in her 30s, no followed by cardiology.  Patient denies any chest pain or symptoms at present.    Physical Exam:  General: No acute distress.  HENT: NCAT, PERRL, Nares patent.  Eyes: no scleral icterus.  Neck: trachea midline, no ROM limitations.  CV: regular rhythm, regular rate.  Respiratory: normal effort, CTAB.  Abdomen: soft, nondistended, NTTP, no rebound tenderness, no guarding or rigidity.  Musculoskeletal: no deformity.  Neuro: alert, moves all extremities, follows commands.  Skin: warm, dry.    Assessment and Plan:  Patient admitted to observation unit, cardiology consulted, trending troponins,  monitoring, n.p.o. after midnight.

## 2024-10-23 ENCOUNTER — APPOINTMENT (OUTPATIENT)
Dept: NUCLEAR MEDICINE | Facility: HOSPITAL | Age: 84
End: 2024-10-23
Payer: MEDICARE

## 2024-10-23 VITALS
OXYGEN SATURATION: 97 % | RESPIRATION RATE: 18 BRPM | SYSTOLIC BLOOD PRESSURE: 150 MMHG | BODY MASS INDEX: 34.77 KG/M2 | WEIGHT: 196.21 LBS | HEIGHT: 63 IN | TEMPERATURE: 98.3 F | HEART RATE: 64 BPM | DIASTOLIC BLOOD PRESSURE: 78 MMHG

## 2024-10-23 PROBLEM — R03.0 ELEVATED BLOOD PRESSURE READING: Status: ACTIVE | Noted: 2024-10-23

## 2024-10-23 PROBLEM — R79.89 ELEVATED TSH: Status: ACTIVE | Noted: 2024-10-23

## 2024-10-23 LAB
ANION GAP SERPL CALCULATED.3IONS-SCNC: 9.8 MMOL/L (ref 5–15)
BH CV REST NUCLEAR ISOTOPE DOSE: 11.1 MCI
BH CV STRESS COMMENTS STAGE 1: NORMAL
BH CV STRESS DOSE REGADENOSON STAGE 1: 0.4
BH CV STRESS DURATION MIN STAGE 1: 0
BH CV STRESS DURATION SEC STAGE 1: 10
BH CV STRESS NUCLEAR ISOTOPE DOSE: 30.5 MCI
BH CV STRESS PROTOCOL 1: NORMAL
BH CV STRESS RECOVERY BP: NORMAL MMHG
BH CV STRESS RECOVERY HR: 66 BPM
BH CV STRESS STAGE 1: 1
BUN SERPL-MCNC: 30 MG/DL (ref 8–23)
BUN/CREAT SERPL: 36.1 (ref 7–25)
CALCIUM SPEC-SCNC: 9.4 MG/DL (ref 8.6–10.5)
CHLORIDE SERPL-SCNC: 104 MMOL/L (ref 98–107)
CO2 SERPL-SCNC: 23.2 MMOL/L (ref 22–29)
CREAT SERPL-MCNC: 0.83 MG/DL (ref 0.57–1)
DEPRECATED RDW RBC AUTO: 46.4 FL (ref 37–54)
EGFRCR SERPLBLD CKD-EPI 2021: 70 ML/MIN/1.73
ERYTHROCYTE [DISTWIDTH] IN BLOOD BY AUTOMATED COUNT: 13.3 % (ref 12.3–15.4)
GLUCOSE SERPL-MCNC: 130 MG/DL (ref 65–99)
HCT VFR BLD AUTO: 37.4 % (ref 34–46.6)
HGB BLD-MCNC: 12.6 G/DL (ref 12–15.9)
LV EF NUC BP: 71 %
MAXIMAL PREDICTED HEART RATE: 137 BPM
MCH RBC QN AUTO: 31.8 PG (ref 26.6–33)
MCHC RBC AUTO-ENTMCNC: 33.7 G/DL (ref 31.5–35.7)
MCV RBC AUTO: 94.4 FL (ref 79–97)
PERCENT MAX PREDICTED HR: 55.47 %
PLATELET # BLD AUTO: 197 10*3/MM3 (ref 140–450)
PMV BLD AUTO: 9.4 FL (ref 6–12)
POTASSIUM SERPL-SCNC: 4.5 MMOL/L (ref 3.5–5.2)
QT INTERVAL: 378 MS
QT INTERVAL: 433 MS
QTC INTERVAL: 422 MS
QTC INTERVAL: 447 MS
RBC # BLD AUTO: 3.96 10*6/MM3 (ref 3.77–5.28)
SODIUM SERPL-SCNC: 137 MMOL/L (ref 136–145)
STRESS BASELINE BP: NORMAL MMHG
STRESS BASELINE HR: 58 BPM
STRESS PERCENT HR: 65 %
STRESS POST PEAK BP: NORMAL MMHG
STRESS POST PEAK HR: 76 BPM
STRESS TARGET HR: 116 BPM
TROPONIN T SERPL HS-MCNC: 12 NG/L
TSH SERPL DL<=0.05 MIU/L-ACNC: 19 UIU/ML (ref 0.27–4.2)
WBC NRBC COR # BLD AUTO: 10.57 10*3/MM3 (ref 3.4–10.8)

## 2024-10-23 PROCEDURE — 99204 OFFICE O/P NEW MOD 45 MIN: CPT | Performed by: INTERNAL MEDICINE

## 2024-10-23 PROCEDURE — 85027 COMPLETE CBC AUTOMATED: CPT | Performed by: PHYSICIAN ASSISTANT

## 2024-10-23 PROCEDURE — 93010 ELECTROCARDIOGRAM REPORT: CPT | Performed by: INTERNAL MEDICINE

## 2024-10-23 PROCEDURE — 78452 HT MUSCLE IMAGE SPECT MULT: CPT | Performed by: INTERNAL MEDICINE

## 2024-10-23 PROCEDURE — 0 TECHNETIUM SESTAMIBI: Performed by: EMERGENCY MEDICINE

## 2024-10-23 PROCEDURE — 84443 ASSAY THYROID STIM HORMONE: CPT | Performed by: PHYSICIAN ASSISTANT

## 2024-10-23 PROCEDURE — 93017 CV STRESS TEST TRACING ONLY: CPT

## 2024-10-23 PROCEDURE — G0378 HOSPITAL OBSERVATION PER HR: HCPCS

## 2024-10-23 PROCEDURE — 93016 CV STRESS TEST SUPVJ ONLY: CPT | Performed by: INTERNAL MEDICINE

## 2024-10-23 PROCEDURE — 80048 BASIC METABOLIC PNL TOTAL CA: CPT | Performed by: PHYSICIAN ASSISTANT

## 2024-10-23 PROCEDURE — 78452 HT MUSCLE IMAGE SPECT MULT: CPT

## 2024-10-23 PROCEDURE — 25010000002 REGADENOSON 0.4 MG/5ML SOLUTION: Performed by: EMERGENCY MEDICINE

## 2024-10-23 PROCEDURE — 93005 ELECTROCARDIOGRAM TRACING: CPT | Performed by: NURSE PRACTITIONER

## 2024-10-23 PROCEDURE — A9500 TC99M SESTAMIBI: HCPCS | Performed by: EMERGENCY MEDICINE

## 2024-10-23 PROCEDURE — 84484 ASSAY OF TROPONIN QUANT: CPT | Performed by: NURSE PRACTITIONER

## 2024-10-23 PROCEDURE — 93018 CV STRESS TEST I&R ONLY: CPT | Performed by: INTERNAL MEDICINE

## 2024-10-23 RX ORDER — REGADENOSON 0.08 MG/ML
0.4 INJECTION, SOLUTION INTRAVENOUS
Status: COMPLETED | OUTPATIENT
Start: 2024-10-23 | End: 2024-10-23

## 2024-10-23 RX ADMIN — TECHNETIUM TC 99M SESTAMIBI 1 DOSE: 1 INJECTION INTRAVENOUS at 09:55

## 2024-10-23 RX ADMIN — LEVOTHYROXINE SODIUM 100 MCG: 50 TABLET ORAL at 05:51

## 2024-10-23 RX ADMIN — REGADENOSON 0.4 MG: 0.08 INJECTION, SOLUTION INTRAVENOUS at 12:05

## 2024-10-23 RX ADMIN — PANTOPRAZOLE SODIUM 40 MG: 40 TABLET, DELAYED RELEASE ORAL at 08:49

## 2024-10-23 RX ADMIN — TECHNETIUM TC 99M SESTAMIBI 1 DOSE: 1 INJECTION INTRAVENOUS at 12:05

## 2024-10-23 RX ADMIN — Medication 10 ML: at 08:52

## 2024-10-23 NOTE — CASE MANAGEMENT/SOCIAL WORK
Discharge Planning Assessment  [facility]     Patient Name: Madisyn Plascencia  MRN: [id number]  Today's Date: 10/23/2024    Admit Date: 10/22/2024    Plan: Return home alone.   Discharge Needs Assessment       Row Name 10/23/24 1041       Living Environment    People in Home alone    Current Living Arrangements home    Primary Care Provided by self    Provides Primary Care For no one    Family Caregiver if Needed child(opal), adult    Family Caregiver Names Deric [phone]    Able to Return to Prior Arrangements yes       Transition Planning    Patient/Family Anticipates Transition to home    Transportation Anticipated family or friend will provide       Discharge Needs Assessment    Readmission Within the Last 30 Days no previous admission in last 30 days    Equipment Currently Used at Home cane, straight    Concerns to be Addressed denies needs/concerns at this time                   Discharge Plan       Row Name 10/23/24 1043       Plan    Plan Return home alone.    Patient/Family in Agreement with Plan yes    Plan Comments MOON letter checked. Met with patient at bedside, introduced self and explained CCP Role. Verified facesheet and PCP- Katarzyna Bland. Patient lives at home alone. Home has 15 step to enter. Patient was ind with ADLS and driving prior to admission. Emergency contact is Son-Deric [phone]. Patient denies h/o HH/SNF and has a cane. Patient uses [facility] on [facility] and reports no difficulty affording medications. Family will transport at DC. Patient denies DC needs at this time. CCP to follow. Saul RODRIGUES RN                  Continued Care and Services - Admitted Since 10/22/2024    No active coordination exists for this encounter.          Demographic Summary       Row Name 10/23/24 1041       General Information    Admission Type observation    Referral Source admission list    Reason for Consult discharge planning    Preferred Language English                   Functional Status        Row Name 10/23/24 1041       Functional Status    Usual Activity Tolerance good    Current Activity Tolerance good       Functional Status, IADL    Medications independent    Meal Preparation independent    Housekeeping independent    Laundry independent    Shopping independent                   Psychosocial    No documentation.                  Abuse/Neglect    No documentation.                  Legal    No documentation.                  Substance Abuse    No documentation.                  Patient Forms    No documentation.                     Saul Damon RN

## 2024-10-23 NOTE — PROGRESS NOTES
JOCELYNN PAEZ Attestation Note    I supervised care provided by the midlevel provider.    The JACKIE and I have discussed this patient's history, physical exam, and treatment plan. I have reviewed the documentation and personally had a face to face interaction with the patient  I affirm the documentation and agree with the treatment and plan. I provided a substantive portion of the care of this patient.  I personally performed the physical exam, in its entirety.  My personal findings are documented in below:    History:  Patient admitted to observation unit for evaluation of chest pain symptoms.  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  This morning she reports some continued pressure in her central chest.  Also asking if she can eat something soon.    Physical Exam:  General: No acute distress.  HENT: NCAT, PERRL, Nares patent.  Eyes: no scleral icterus.  Neck: trachea midline, no ROM limitations.  CV: Pink warm and well-perfused throughout  Respiratory: No distress or increased work of breathing  Abdomen: soft, no focal tenderness or rigidity  Musculoskeletal: no deformity.  Neuro: alert, moves all extremities, follows commands.  Skin: warm, dry.    Assessment and Plan:  Chest pain: Troponins 13, 10, 12.  Cardiology consulted.  Arranging for pharmacologic nuclear perfusion study.    Hyperlipidemia: Continue home statin    GERD: Continue PPI

## 2024-10-23 NOTE — DISCHARGE INSTRUCTIONS
Your nuclear stress test showed good cardiac perfusion.  I would follow-up with my primary cardiologist in roughly 1 to 2 months time for repeat evaluation.  No test and medicine are perfect.  Should you have any further bouts of chest pain but just do not feel right, I would recommend close follow-up in the emergency department for repeat evaluation.    Your thyroid-stimulating hormone was checked while you are in the hospital and noted to be elevated.  It may be that your Synthroid needs to be increased for your hypothyroidism.  I would follow-up closely with the family physician in the next week or 2 for repeat thyroid studies to see if this truly needs to be adjusted.    Follow-up with primary care for all other issues.

## 2024-10-23 NOTE — CONSULTS
Patient Name: Madisyn Plascencia  :1940  83 y.o.    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    Patient recently had parotid surgery.  2 days ago she had an episode of lower precordial discomfort with radiation to underneath her right shoulder blade.  This occurred when she was laying down.  No nausea vomiting diaphoresis or shortness of breath.  Lasted maybe 5 minutes.  She had not recently eaten.  Then yesterday about 1:00 in the afternoon she was standing in her kitchen when she had the same symptoms.  She called her primary care he was instructed to the emergency room, she was then admitted for further evaluation.    In the emergency room EKG was unremarkable.  Troponin was negative with a negative delta.    Blood pressure was markedly elevated on arrival, she does not have a history of uncontrolled hypertension.  She was not felt to be having acute aortic syndrome and she was admitted to the observation unit and cardio was asked to evaluate for cardiac etiology of her chest discomfort.    She has been pain-free overnight.  EKG is morning shows no abnormality.    No prior history of coronary disease, congestive heart failure, rheumatic fever, rheumatic heart disease or congenital heart disease.  Distant stress test that was unremarkable.    ECHO 23    Left ventricular systolic function  is normal. Calculated left ventricular EF = 61.6%    Left ventricular wall thickness is consistent with hypertrophy. Sigmoid-shaped ventricular septum is present.    Left ventricular diastolic function was normal.    There is mild calcification of the aortic valve.    Past Medical History:   Diagnosis Date    Anxiety     Arthritis     C. difficile colitis     Difficulty swallowing pills     Disease of thyroid gland     Diverticulitis     GERD (gastroesophageal reflux disease)     H/O uterine prolapse     History of esophageal dilatation     Hyperlipidemia     Hypertension     Hypothyroidism     Knee swelling 2011    Had right knee replaced first … had to choose which knee first    Osteopenia     Periarthritis of shoulder 9/26/2022    Dr. Hamilton visit    Prediabetes     Recent weight loss     Rotator cuff syndrome 9-    Dr. Hamilton diagnosed on first visit…mild    Scoliosis     Shoulder injury     Tear of meniscus of knee 2011    Had both knees replaced 2011/2022    Tremor 10/06/2023    Type 2 diabetes mellitus with hyperglycemia 09/03/2023    Uterine prolapse        Past Surgical History:   Procedure Laterality Date    CHOLECYSTECTOMY      COLONOSCOPY      ENDOSCOPY      ENDOSCOPY N/A 05/25/2021    Procedure: ESOPHAGOGASTRODUODENOSCOPY WITH 52 [place] BRADFORD DILATION;  Surgeon: Tripp Pascal MD;  Location: [facility] ENDOSCOPY;  Service: Gastroenterology;  Laterality: N/A;  PRE- DYSPHAGIA  POST- ESOPHAGEAL RING    ENDOSCOPY N/A 11/3/2023    Procedure: ESOPHAGOGASTRODUODENOSCOPY with biopsy and bradford dialation;  Surgeon: Tripp Pascal MD;  Location: [facility] ENDOSCOPY;  Service: Gastroenterology;  Laterality: N/A;  pre- dysphagia   post-    JOINT REPLACEMENT Right     KNEE    TONSILLECTOMY      TOTAL KNEE ARTHROPLASTY Left 11/22/2022    Procedure: LEFT TOTAL KNEE ARTHROPLASTY;  Surgeon: Antonio Riggins MD;  Location: [facility] OR [facility] [place], [place];  Service: Orthopedics;  Laterality: Left;         Prior to Admission  medications    Medication Sig Start Date End Date Taking? Authorizing Provider   atorvastatin (LIPITOR) 10 MG tablet Take 1 tablet by mouth Every Other Day. TAKES NIGHTLY   Yes Emergency, Nurse Radhika RN   celecoxib (CeleBREX) 200 MG capsule Take 1 capsule by mouth Every Night. 1/21/23  Yes Jerald Bledsoe MD   Cholecalciferol (VITAMIN D3) 5000 UNITS capsule capsule Take 1,000 Units by mouth Daily.   Yes Emergency, Nurse Radhika RN   docusate sodium (COLACE) 100 MG capsule Take 1 capsule by mouth 2 (Two) Times a Day.   Yes Jerald Bledsoe MD   pantoprazole (PROTONIX) 40 MG EC tablet Take 1 tablet by mouth 2 (Two) Times a Day.  Patient taking differently: Take 1 tablet by mouth 2 (Two) Times a Day. PATIENT IS OUT OF THIS MEDICINE AND NEEDS A REFILL 9/3/23  Yes Reagan Anderson MD   polyethylene glycol (MIRALAX) 17 GM/SCOOP powder Take 17 g by mouth Daily.   Yes Jerald Bledsoe MD   predniSONE (DELTASONE) 20 MG tablet Take 1 tablet by mouth Daily.   Yes Jerald Bledsoe MD   Synthroid 100 MCG tablet Take 1 tablet by mouth Daily. 1/24/23  Yes Jerald Bledsoe MD   vitamin C (ASCORBIC ACID) 500 MG tablet Take 1 tablet by mouth Daily.   Yes Emergency, Nurse Epic, RN   zolpidem (AMBIEN) 5 MG tablet Take 1 tablet by mouth At Night As Needed for Sleep.   Yes Jerald Bledsoe MD       Allergies   Allergen Reactions    Sulfa Antibiotics Itching     Topical       Social History     Socioeconomic History    Marital status: Single   Tobacco Use    Smoking status: Never     Passive exposure: Never    Smokeless tobacco: Never    Tobacco comments:     Tried in college... didn't last   Vaping Use    Vaping status: Never Used   Substance and Sexual Activity    Alcohol use: Not Currently    Drug use: Never    Sexual activity: Not Currently     Partners: Male     Birth control/protection: Pill, Diaphragm, Birth control pill     Comment: I’m 82… N/A       Family History   Problem Relation Age of Onset  "   Cancer Mother          1964    Early death Mother     Heart attack Father     Heart disease Father     Migraines Daughter     Malchristiane Hyperthermia Neg Hx        REVIEW OF SYSTEMS:   All systems reviewed.  Pertinent positives identified in HPI.  All other systems are negative.      Objective:     Vitals:    10/22/24 1903 10/22/24 2334 10/23/24 0416 10/23/24 0700   BP: 176/91 152/92 117/64 136/78   BP Location: Left arm Right arm Right arm Right arm   Patient Position: Lying Lying Lying Lying   Pulse: 74 63 53 55   Resp: 16 16 18 18   Temp: 98.2 °F (36.8 °C) 97.7 °F (36.5 °C) 98.2 °F (36.8 °C) 98.4 °F (36.9 °C)   TempSrc: Oral Oral Oral Oral   SpO2: 96% 94% 98% 98%   Height: 160 cm (63\")        Body mass index is 34.74 kg/m².    Physical Exam:  General Appearance:    Alert, cooperative, in no acute distress   Head:    Normocephalic, without obvious abnormality   Eyes:            Lids and lashes normal, conjunctivae and sclerae normal, no icterus, no pallor, corneas clear   Ears:    Ears appear intact with no abnormalities noted   Throat:   No oral lesions, oral mucosa moist   Neck:   No adenopathy, supple, trachea midline, no thyromegaly, no carotid bruit, no JVD   Back:     No kyphosis present, no erythema or scars, no tenderness to palpation    Lungs:     Clear to auscultation,respirations regular, even and unlabored    Heart:    Regular rhythm and normal rate, normal S1 and S2, no murmur, no gallop, no rub, no click   Chest Wall:    No abnormalities observed   Abdomen:     Normal bowel sounds, no masses, no organomegaly, soft        non-tender, non-distended, no guarding   Extremities:   Moves all extremities well, no edema, no cyanosis, no redness   Pulses:  Bilateral carotids brisk   Skin:  Psychiatric:   No bleeding or rash    Alert and oriented, normal mood and affect         Lab Review:     Results from last 7 days   Lab Units 10/23/24  0440 10/22/24  1635   SODIUM mmol/L 137 133*   POTASSIUM mmol/L " 4.5 4.2   CHLORIDE mmol/L 104 99   CO2 mmol/L 23.2 23.4   BUN mg/dL 30* 31*   CREATININE mg/dL 0.83 1.16*   CALCIUM mg/dL 9.4 9.9   BILIRUBIN mg/dL  --  0.5   ALK PHOS U/L  --  82   ALT (SGPT) U/L  --  26   AST (SGOT) U/L  --  22   GLUCOSE mg/dL 130* 95     Results from last 7 days   Lab Units 10/23/24  0440 10/22/24  1838 10/22/24  1635   HSTROP T ng/L 12 10 13     Results from last 7 days   Lab Units 10/23/24  0440   WBC 10*3/mm3 10.57   HEMOGLOBIN g/dL 12.6   HEMATOCRIT % 37.4   PLATELETS 10*3/mm3 197             Results from last 7 days   Lab Units 10/22/24  1635   CHOLESTEROL mg/dL 198   TRIGLYCERIDES mg/dL 214*   HDL CHOL mg/dL 72*   LDL CHOL mg/dL 91                                     I personally viewed and interpreted the chest CT scan from July of last year.  Ascending aorta measured approximately 3.4 cm by my review on the computer screen.  Scant coronary artery calcification is present but was not mentioned by the radiologist.  No pathology there was mention by radiology at that time.    I personally viewed and interpreted the patient's EKG/Telemetry data.      Current Facility-Administered Medications:     atorvastatin (LIPITOR) tablet 10 mg, 10 mg, Oral, Every Other Day, Emmaneul Dennis PA, 10 mg at 10/22/24 2156    celecoxib (CeleBREX) capsule 200 mg, 200 mg, Oral, Nightly, Emmanuel Dennis PA, 200 mg at 10/22/24 2311    docusate sodium (COLACE) capsule 100 mg, 100 mg, Oral, BID, Emmanuel Dennis PA, 100 mg at 10/22/24 2157    levothyroxine (SYNTHROID, LEVOTHROID) tablet 100 mcg, 100 mcg, Oral, Q AM, Emmanuel Dennis PA, 100 mcg at 10/23/24 0551    nitroglycerin (NITROSTAT) SL tablet 0.4 mg, 0.4 mg, Sublingual, Q5 Min PRN, Emmanuel Dennis PA    pantoprazole (PROTONIX) EC tablet 40 mg, 40 mg, Oral, BID, Emmanuel Dennis PA    polyethylene glycol (MIRALAX) packet 17 g, 17 g, Oral, Daily, Emmanuel Dennis PA    predniSONE (DELTASONE) tablet 20 mg, 20 mg, Oral, Daily, Emmanuel Dennis PA    sodium chloride 0.9 % flush 10 mL, 10 mL,  Intravenous, PRN, Emmanuel Dennis PA    [COMPLETED] Insert Peripheral IV, , , Once **AND** sodium chloride 0.9 % flush 10 mL, 10 mL, Intravenous, PRN, Emmanuel Dennis PA    sodium chloride 0.9 % flush 10 mL, 10 mL, Intravenous, Q12H, Emmanuel Dennis PA, 10 mL at 10/22/24 2156    sodium chloride 0.9 % flush 10 mL, 10 mL, Intravenous, PRN, Emmanuel Dennis PA    zolpidem (AMBIEN) tablet 5 mg, 5 mg, Oral, Nightly PRN, Emmanuel Dennis PA, 5 mg at 10/22/24 2157    Assessment and Plan:       Active Hospital Problems    Diagnosis  POA    **Chest pain [R07.9]  Yes      Resolved Hospital Problems   No resolved problems to display.     1.  Chest discomfort-somewhat atypical but cardiac risk factors, we will arrange for pharmacologic nuclear perfusion study  2.  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  Marked elevated blood pressure on arrival-some back pain, not felt to be acute aortic syndrome by ER and was admitted observation for further evaluation  4.  Mixed hyperlipidemia, AST, ALT reviewed  5.  YASMIN Corcoran III, MD  10/23/24  08:38 EDT

## 2024-10-23 NOTE — PLAN OF CARE
Goal Outcome Evaluation:         Pt admitted to Observation for chest pain (left side). Pt A&Ox4 and up ad ronn. Pt to have a Cardiology consult. Pt has been NPO since midnight. 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 Incision GERMAIN. Daughter Dasha would like to have a call in the morning after consult.

## 2024-10-23 NOTE — PLAN OF CARE
Goal Outcome Evaluation:   Patient has been discharged to private vehicle off the unit. IV was removed; belongings taken by the patient. Discharge and follow up info has been shared and a verbal understanding relayed back to the nurse.

## 2024-10-23 NOTE — DISCHARGE SUMMARY
ED OBSERVATION PROGRESS/DISCHARGE SUMMARY    Date of Admission: 10/22/2024   LOS: 0 days   PCP: Katarzyna Bland MD    Final Diagnosis chest pain      Subjective     Hospital Outcome: Discharge    Madisyn Plascencia is a 83 y.o. female comes in complaining of chest pain since yesterday evening.  Patient states she went to lie down for bed around 10 PM when she had a pressure-like feeling in the right side of her chest.  Patient states it happened again at 1 PM today on the right side of the chest and will somewhat wrap around to her side into her back.  Patient otherwise denies any cough, fever, chills, vomiting.  Patient does report some mild nausea with the symptoms but denies any nausea currently.  Patient denies any urinary symptoms or diarrhea.  Patient states that she is currently on a prednisone course for arthritis of her shoulders.  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  Patient states her chest pain is not worse after eating food and denies any exertional symptoms as patient was laying down when her pain initially started.  Patient states that her episodes of pain last about 5 minutes.     In the ED, initial troponin 13, repeat troponin of 10.  Serum creatinine 1.1 was previously 1.0, 11 days ago.  GFR of 46.  Lipid panel shows mildly elevated HDL and triglycerides.  White blood cell count of 14.3 otherwise unremarkable CBC.  Last white blood cell count of 12.6 about 11 days ago.  Chest x-ray shows mild atelectasis in the lung bases.  EKG shows sinus rhythm 84 bpm, no ST elevation apparent.  Patient is afebrile, pulse in the 70s, on room air oxygen 96% SpO2 and blood pressure 170s over 90s.  Patient given full dose aspirin in ED, Lopressor 25 mg p.o. and 500 normal saline bolus in ED.     Review of Systems              All systems were reviewed and negative except for: as per HPI    ROS:  General: no fevers, chills  Respiratory: no cough,  dyspnea  Cardiovascular: Chest pain   abdomen: No abdominal pain, nausea, vomiting, or diarrhea  Neurologic: No focal weakness    10/23/2024.    4:17 PM.  Patient has been evaluated by cardiology.  Nuclear perfusion study negative.  Plan to DC home with close outpatient cardiology follow-up.  Is also noted that the patient's TSH was checked yesterday evening and noted to be elevated.  Will have her to continue with her current dose of Synthroid but follow-up closely with primary care physician in regards to her elevated TSH.  I will go ahead and add on a free T4 at this visit see the primary care physician has both TSH and T4 to go by at follow-up.    Objective   Physical Exam:  I have reviewed the vital signs.  Temp:  [97.7 °F (36.5 °C)-98.6 °F (37 °C)] 98.3 °F (36.8 °C)  Heart Rate:  [53-97] 64  Resp:  [16-18] 18  BP: (117-176)/() 150/78  General Appearance:    Alert, cooperative, no distress  Head:    Normocephalic, atraumatic  Eyes:    Sclerae anicteric  Neck:   Supple, no mass  Lungs: Clear to auscultation bilaterally, respirations unlabored  Heart: Regular rate and rhythm, S1 and S2 normal, no murmur, rub or gallop  Abdomen:  Soft, nontender, bowel sounds active, nondistended  Extremities: No clubbing, cyanosis, or edema to lower extremities  Pulses:  2+ and symmetric in distal lower extremities  Skin: No rashes   Neurologic: Oriented x3, Normal strength to extremities    Results Review:    I have reviewed the labs, radiology results and diagnostic studies.    Results from last 7 days   Lab Units 10/23/24  0440   WBC 10*3/mm3 10.57   HEMOGLOBIN g/dL 12.6   HEMATOCRIT % 37.4   PLATELETS 10*3/mm3 197     Results from last 7 days   Lab Units 10/23/24  0440 10/22/24  1635   SODIUM mmol/L 137 133*   POTASSIUM mmol/L 4.5 4.2   CHLORIDE mmol/L 104 99   CO2 mmol/L 23.2 23.4   BUN mg/dL 30* 31*   CREATININE mg/dL 0.83 1.16*   CALCIUM mg/dL 9.4 9.9   BILIRUBIN mg/dL  --  0.5   ALK PHOS U/L  --  82   ALT (SGPT) U/L   --  26   AST (SGOT) U/L  --  22   GLUCOSE mg/dL 130* 95     Imaging Results (Last 24 Hours)       Procedure Component Value Units Date/Time    XR Chest 1 View [[id number]] Collected: 10/22/24 1719     Updated: 10/22/24 1723    Narrative:      AP CHEST     HISTORY: Chest pain     COMPARISON: 12/8/2023     FINDINGS: There is mild atelectasis in the lung bases. Calcified  granuloma left lung. No appreciable pneumothorax. Heart size stable. No  acute bony abnormality.       Impression:      Mild atelectasis in the lung bases        This report was finalized on 10/22/2024 5:20 PM by Dr. Kishore Mo M.D on Workstation: [id number]               I have reviewed the medications.  ---------------------------------------------------------------------------------------------  Assessment & Plan   Assessment/Problem List    Chest pain      Plan:  Chest pain  - initial troponin 13, repeat troponin of 10.    -Serum creatinine 1.1 was previously 1.0, 11 days ago.  GFR of 46.    -Chest x-ray shows mild atelectasis in the lung bases.   - EKG shows sinus rhythm 84 bpm, no ST elevation apparent.  Patient is afebrile, pulse in the 70s, on room air oxygen 96% SpO2 and blood pressure 170s over 90s.    -Patient given full dose aspirin in ED, Lopressor 25 mg p.o. and 500 normal saline bolus in ED.  -Cardiology has evaluated.  Myocardial perfusion study normal with no evidence of ischemia.  Patient cleared for discharge and close follow-up in outpatient setting.     Mild leukocytosis  -Likely secondary to steroids and reactive from postoperative state, no fever or infectious symptoms at this time  - White blood cell count of 14.3 otherwise unremarkable CBC.  Last white blood cell count of 12.6 about 11 days ago.    -Chest x-ray shows mild atelectasis in the lung bases.   -UA negative  -Continue to trend on an outpatient basis     Arthritis of shoulders bilaterally  -Currently on prednisone, continue Celebrex     Hypothyroidism  -TSH  elevated.  The patient to continue with her Synthroid 100 mcg daily for now, but follow-up closely with the primary care physician as this will likely need adjustment     Hyperlipidemia  -Lipid panel shows mildly elevated HDL and triglycerides.   -Continue home statin     Anxiety/depression  -Continue home BuSpar     GERD  -Continue home PPI         Disposition: Discharge    Follow-up after Discharge: Primary care, cardiology    This note will serve as a discharge summary    Raji Quispe III, PA 10/23/24 16:18 EDT    I have worn appropriate PPE during this patient encounter, sanitized my hands both with entering and exiting patient's room.      30 minutes has been spent by [facility] providers in the care of this patient while under observation status

## 2024-10-24 NOTE — CASE MANAGEMENT/SOCIAL WORK
Case Management Discharge Note      Final Note: pt d/maureen home         Selected Continued Care - Discharged on 10/23/2024 Admission date: 10/22/2024 - Discharge disposition: Home or Self Care      Destination    No services have been selected for the patient.                Durable Medical Equipment    No services have been selected for the patient.                Dialysis/Infusion    No services have been selected for the patient.                Home Medical Care    No services have been selected for the patient.                Therapy    No services have been selected for the patient.                Community Resources    No services have been selected for the patient.                Community & DME    No services have been selected for the patient.                         Final Discharge Disposition Code: 01 - home or self-care

## 2024-10-29 ENCOUNTER — TRANSCRIBE ORDERS (OUTPATIENT)
Dept: ADMINISTRATIVE | Facility: HOSPITAL | Age: 84
End: 2024-10-29
Payer: MEDICARE

## 2024-10-29 ENCOUNTER — LAB (OUTPATIENT)
Dept: LAB | Facility: HOSPITAL | Age: 84
End: 2024-10-29
Payer: MEDICARE

## 2024-10-29 ENCOUNTER — DOCUMENTATION (OUTPATIENT)
Dept: ONCOLOGY | Facility: CLINIC | Age: 84
End: 2024-10-29
Payer: MEDICARE

## 2024-10-29 DIAGNOSIS — E03.9 MYXEDEMA HEART DISEASE: Primary | ICD-10-CM

## 2024-10-29 DIAGNOSIS — I51.9 MYXEDEMA HEART DISEASE: Primary | ICD-10-CM

## 2024-10-29 DIAGNOSIS — I51.9 MYXEDEMA HEART DISEASE: ICD-10-CM

## 2024-10-29 DIAGNOSIS — E03.9 MYXEDEMA HEART DISEASE: ICD-10-CM

## 2024-10-29 LAB
T4 FREE SERPL-MCNC: 1.52 NG/DL (ref 0.92–1.68)
TSH SERPL DL<=0.05 MIU/L-ACNC: 6.81 UIU/ML (ref 0.27–4.2)

## 2024-10-29 PROCEDURE — 84443 ASSAY THYROID STIM HORMONE: CPT

## 2024-10-29 PROCEDURE — 84439 ASSAY OF FREE THYROXINE: CPT

## 2024-10-29 PROCEDURE — 36415 COLL VENOUS BLD VENIPUNCTURE: CPT

## 2024-10-29 NOTE — PROGRESS NOTES
Contacted Dr. Bland, benign findings per parotid mass after review of studies including flow cytometry and morphology.  BURT

## 2024-11-26 ENCOUNTER — APPOINTMENT (OUTPATIENT)
Dept: WOMENS IMAGING | Facility: HOSPITAL | Age: 84
End: 2024-11-26
Payer: MEDICARE

## 2024-11-26 PROCEDURE — 77063 BREAST TOMOSYNTHESIS BI: CPT | Performed by: RADIOLOGY

## 2024-11-26 PROCEDURE — 77067 SCR MAMMO BI INCL CAD: CPT | Performed by: RADIOLOGY

## 2024-12-02 ENCOUNTER — TRANSCRIBE ORDERS (OUTPATIENT)
Dept: ADMINISTRATIVE | Facility: HOSPITAL | Age: 84
End: 2024-12-02
Payer: MEDICARE

## 2024-12-02 ENCOUNTER — LAB (OUTPATIENT)
Dept: LAB | Facility: HOSPITAL | Age: 84
End: 2024-12-02
Payer: MEDICARE

## 2024-12-02 DIAGNOSIS — E11.9 DIABETES MELLITUS WITHOUT COMPLICATION: ICD-10-CM

## 2024-12-02 DIAGNOSIS — E03.9 HYPOTHYROIDISM, UNSPECIFIED TYPE: Primary | ICD-10-CM

## 2024-12-02 DIAGNOSIS — E03.9 HYPOTHYROIDISM, UNSPECIFIED TYPE: ICD-10-CM

## 2024-12-02 LAB
ALBUMIN SERPL-MCNC: 4.1 G/DL (ref 3.5–5.2)
ALBUMIN/GLOB SERPL: 1.8 G/DL
ALP SERPL-CCNC: 77 U/L (ref 39–117)
ALT SERPL W P-5'-P-CCNC: 29 U/L (ref 1–33)
ANION GAP SERPL CALCULATED.3IONS-SCNC: 11.2 MMOL/L (ref 5–15)
AST SERPL-CCNC: 27 U/L (ref 1–32)
BILIRUB SERPL-MCNC: 0.3 MG/DL (ref 0–1.2)
BUN SERPL-MCNC: 29 MG/DL (ref 8–23)
BUN/CREAT SERPL: 31.5 (ref 7–25)
CALCIUM SPEC-SCNC: 10.1 MG/DL (ref 8.6–10.5)
CHLORIDE SERPL-SCNC: 99 MMOL/L (ref 98–107)
CO2 SERPL-SCNC: 25.8 MMOL/L (ref 22–29)
CREAT SERPL-MCNC: 0.92 MG/DL (ref 0.57–1)
EGFRCR SERPLBLD CKD-EPI 2021: 61.5 ML/MIN/1.73
GLOBULIN UR ELPH-MCNC: 2.3 GM/DL
GLUCOSE SERPL-MCNC: 97 MG/DL (ref 65–99)
HBA1C MFR BLD: 7.1 % (ref 4.8–5.6)
POTASSIUM SERPL-SCNC: 4 MMOL/L (ref 3.5–5.2)
PROT SERPL-MCNC: 6.4 G/DL (ref 6–8.5)
SODIUM SERPL-SCNC: 136 MMOL/L (ref 136–145)
T4 FREE SERPL-MCNC: 1.33 NG/DL (ref 0.92–1.68)
TSH SERPL DL<=0.05 MIU/L-ACNC: 10 UIU/ML (ref 0.27–4.2)

## 2024-12-02 PROCEDURE — 80053 COMPREHEN METABOLIC PANEL: CPT

## 2024-12-02 PROCEDURE — 84439 ASSAY OF FREE THYROXINE: CPT

## 2024-12-02 PROCEDURE — 83036 HEMOGLOBIN GLYCOSYLATED A1C: CPT

## 2024-12-02 PROCEDURE — 36415 COLL VENOUS BLD VENIPUNCTURE: CPT

## 2024-12-02 PROCEDURE — 84443 ASSAY THYROID STIM HORMONE: CPT

## 2025-01-14 ENCOUNTER — TRANSCRIBE ORDERS (OUTPATIENT)
Dept: LAB | Facility: HOSPITAL | Age: 85
End: 2025-01-14
Payer: MEDICARE

## 2025-01-14 ENCOUNTER — TRANSCRIBE ORDERS (OUTPATIENT)
Dept: ADMINISTRATIVE | Facility: HOSPITAL | Age: 85
End: 2025-01-14
Payer: MEDICARE

## 2025-01-14 ENCOUNTER — LAB (OUTPATIENT)
Dept: LAB | Facility: HOSPITAL | Age: 85
End: 2025-01-14
Payer: MEDICARE

## 2025-01-14 DIAGNOSIS — R11.0 NAUSEA: ICD-10-CM

## 2025-01-14 DIAGNOSIS — Z79.52 LONG TERM CURRENT USE OF SYSTEMIC STEROIDS: Primary | ICD-10-CM

## 2025-01-14 DIAGNOSIS — R11.0 NAUSEA: Primary | ICD-10-CM

## 2025-01-14 LAB
ALBUMIN SERPL-MCNC: 4.2 G/DL (ref 3.5–5.2)
ALBUMIN/GLOB SERPL: 1.6 G/DL
ALP SERPL-CCNC: 87 U/L (ref 39–117)
ALT SERPL W P-5'-P-CCNC: 25 U/L (ref 1–33)
AMYLASE SERPL-CCNC: 52 U/L (ref 28–100)
ANION GAP SERPL CALCULATED.3IONS-SCNC: 10.2 MMOL/L (ref 5–15)
AST SERPL-CCNC: 25 U/L (ref 1–32)
BASOPHILS # BLD AUTO: 0.03 10*3/MM3 (ref 0–0.2)
BASOPHILS NFR BLD AUTO: 0.3 % (ref 0–1.5)
BILIRUB SERPL-MCNC: 0.4 MG/DL (ref 0–1.2)
BILIRUB UR QL STRIP: NEGATIVE
BUN SERPL-MCNC: 29 MG/DL (ref 8–23)
BUN/CREAT SERPL: 30.9 (ref 7–25)
CALCIUM SPEC-SCNC: 10 MG/DL (ref 8.6–10.5)
CHLORIDE SERPL-SCNC: 101 MMOL/L (ref 98–107)
CLARITY UR: CLEAR
CO2 SERPL-SCNC: 24.8 MMOL/L (ref 22–29)
COLOR UR: YELLOW
CREAT SERPL-MCNC: 0.94 MG/DL (ref 0.57–1)
DEPRECATED RDW RBC AUTO: 46.9 FL (ref 37–54)
EGFRCR SERPLBLD CKD-EPI 2021: 60 ML/MIN/1.73
EOSINOPHIL # BLD AUTO: 0.08 10*3/MM3 (ref 0–0.4)
EOSINOPHIL NFR BLD AUTO: 0.8 % (ref 0.3–6.2)
ERYTHROCYTE [DISTWIDTH] IN BLOOD BY AUTOMATED COUNT: 13.5 % (ref 12.3–15.4)
GLOBULIN UR ELPH-MCNC: 2.6 GM/DL
GLUCOSE SERPL-MCNC: 128 MG/DL (ref 65–99)
GLUCOSE UR STRIP-MCNC: NEGATIVE MG/DL
HCT VFR BLD AUTO: 40.1 % (ref 34–46.6)
HGB BLD-MCNC: 13.7 G/DL (ref 12–15.9)
HGB UR QL STRIP.AUTO: NEGATIVE
IMM GRANULOCYTES # BLD AUTO: 0.04 10*3/MM3 (ref 0–0.05)
IMM GRANULOCYTES NFR BLD AUTO: 0.4 % (ref 0–0.5)
KETONES UR QL STRIP: NEGATIVE
LEUKOCYTE ESTERASE UR QL STRIP.AUTO: NEGATIVE
LYMPHOCYTES # BLD AUTO: 1.01 10*3/MM3 (ref 0.7–3.1)
LYMPHOCYTES NFR BLD AUTO: 9.5 % (ref 19.6–45.3)
MCH RBC QN AUTO: 32.5 PG (ref 26.6–33)
MCHC RBC AUTO-ENTMCNC: 34.2 G/DL (ref 31.5–35.7)
MCV RBC AUTO: 95.2 FL (ref 79–97)
MONOCYTES # BLD AUTO: 0.63 10*3/MM3 (ref 0.1–0.9)
MONOCYTES NFR BLD AUTO: 5.9 % (ref 5–12)
NEUTROPHILS NFR BLD AUTO: 8.84 10*3/MM3 (ref 1.7–7)
NEUTROPHILS NFR BLD AUTO: 83.1 % (ref 42.7–76)
NITRITE UR QL STRIP: NEGATIVE
NRBC BLD AUTO-RTO: 0 /100 WBC (ref 0–0.2)
PH UR STRIP.AUTO: 6 [PH] (ref 5–8)
PLATELET # BLD AUTO: 233 10*3/MM3 (ref 140–450)
PMV BLD AUTO: 9.1 FL (ref 6–12)
POTASSIUM SERPL-SCNC: 4.8 MMOL/L (ref 3.5–5.2)
PROT SERPL-MCNC: 6.8 G/DL (ref 6–8.5)
PROT UR QL STRIP: NEGATIVE
RBC # BLD AUTO: 4.21 10*6/MM3 (ref 3.77–5.28)
SODIUM SERPL-SCNC: 136 MMOL/L (ref 136–145)
SP GR UR STRIP: 1.02 (ref 1–1.03)
UROBILINOGEN UR QL STRIP: NORMAL
WBC NRBC COR # BLD AUTO: 10.63 10*3/MM3 (ref 3.4–10.8)

## 2025-01-14 PROCEDURE — 80053 COMPREHEN METABOLIC PANEL: CPT

## 2025-01-14 PROCEDURE — 82150 ASSAY OF AMYLASE: CPT

## 2025-01-14 PROCEDURE — 36415 COLL VENOUS BLD VENIPUNCTURE: CPT

## 2025-01-14 PROCEDURE — 85025 COMPLETE CBC W/AUTO DIFF WBC: CPT

## 2025-01-14 PROCEDURE — 81003 URINALYSIS AUTO W/O SCOPE: CPT

## 2025-01-24 PROBLEM — Z79.52 CURRENT CHRONIC USE OF SYSTEMIC STEROIDS: Status: ACTIVE | Noted: 2025-01-24

## 2025-01-24 RX ORDER — SODIUM CHLORIDE 9 MG/ML
20 INJECTION, SOLUTION INTRAVENOUS ONCE
OUTPATIENT
Start: 2025-01-28

## 2025-01-24 RX ORDER — SODIUM CHLORIDE 9 MG/ML
20 INJECTION, SOLUTION INTRAVENOUS ONCE
Status: CANCELLED | OUTPATIENT
Start: 2025-01-28

## 2025-01-24 RX ORDER — COSYNTROPIN 0.25 MG/ML
0.25 INJECTION, POWDER, FOR SOLUTION INTRAMUSCULAR; INTRAVENOUS ONCE
OUTPATIENT
Start: 2025-01-28

## 2025-01-28 ENCOUNTER — HOSPITAL ENCOUNTER (OUTPATIENT)
Dept: INFUSION THERAPY | Facility: HOSPITAL | Age: 85
Discharge: HOME | End: 2025-01-28
Admitting: INTERNAL MEDICINE
Payer: MEDICARE

## 2025-01-28 VITALS
HEART RATE: 79 BPM | OXYGEN SATURATION: 100 % | DIASTOLIC BLOOD PRESSURE: 93 MMHG | TEMPERATURE: 98.8 F | SYSTOLIC BLOOD PRESSURE: 175 MMHG | RESPIRATION RATE: 20 BRPM

## 2025-01-28 DIAGNOSIS — Z79.52 CURRENT CHRONIC USE OF SYSTEMIC STEROIDS: Primary | ICD-10-CM

## 2025-01-28 LAB
CORTIS SERPL-MCNC: 13.1 MCG/DL
CORTIS SERPL-MCNC: 21.6 MCG/DL
CORTIS SERPL-MCNC: 24.4 MCG/DL

## 2025-01-28 PROCEDURE — 36415 COLL VENOUS BLD VENIPUNCTURE: CPT

## 2025-01-28 PROCEDURE — 25010000002 COSYNTROPIN PER 0.25 MG: Performed by: INTERNAL MEDICINE

## 2025-01-28 PROCEDURE — 82024 ASSAY OF ACTH: CPT | Performed by: INTERNAL MEDICINE

## 2025-01-28 PROCEDURE — 82533 TOTAL CORTISOL: CPT | Performed by: INTERNAL MEDICINE

## 2025-01-28 PROCEDURE — 96374 THER/PROPH/DIAG INJ IV PUSH: CPT

## 2025-01-28 RX ORDER — COSYNTROPIN 0.25 MG/ML
0.25 INJECTION, POWDER, FOR SOLUTION INTRAMUSCULAR; INTRAVENOUS ONCE
Status: COMPLETED | OUTPATIENT
Start: 2025-01-28 | End: 2025-01-28

## 2025-01-28 RX ORDER — SODIUM CHLORIDE 9 MG/ML
20 INJECTION, SOLUTION INTRAVENOUS ONCE
Status: DISCONTINUED | OUTPATIENT
Start: 2025-01-28 | End: 2025-01-30 | Stop reason: HOSPADM

## 2025-01-28 RX ORDER — SODIUM CHLORIDE 9 MG/ML
20 INJECTION, SOLUTION INTRAVENOUS ONCE
Status: CANCELLED | OUTPATIENT
Start: 2025-01-28

## 2025-01-28 RX ORDER — COSYNTROPIN 0.25 MG/ML
0.25 INJECTION, POWDER, FOR SOLUTION INTRAMUSCULAR; INTRAVENOUS ONCE
Status: CANCELLED | OUTPATIENT
Start: 2025-01-28

## 2025-01-28 RX ADMIN — COSYNTROPIN 0.25 MG: 0.25 INJECTION, POWDER, LYOPHILIZED, FOR SOLUTION INTRAMUSCULAR; INTRAVENOUS at 08:24

## 2025-01-29 LAB — ACTH PLAS-MCNC: 21.1 PG/ML (ref 7.2–63.3)

## 2025-02-06 ENCOUNTER — TRANSCRIBE ORDERS (OUTPATIENT)
Dept: DIABETES SERVICES | Facility: HOSPITAL | Age: 85
End: 2025-02-06
Payer: MEDICARE

## 2025-02-06 DIAGNOSIS — E11.9 DIABETES MELLITUS WITHOUT COMPLICATION: Primary | ICD-10-CM

## 2025-02-12 ENCOUNTER — HOSPITAL ENCOUNTER (OUTPATIENT)
Dept: DIABETES SERVICES | Facility: HOSPITAL | Age: 85
Discharge: HOME | End: 2025-02-12
Payer: MEDICARE

## 2025-02-12 PROCEDURE — 97802 MEDICAL NUTRITION INDIV IN: CPT

## 2025-02-12 NOTE — PROGRESS NOTES
Diabetes Education    Patient Name:  Madisyn Plascencia  YOB: 1940  MRN: [id number]  Admit Date:  2/12/2025      Ms. Plascencia met with CHRIS GONCALVES for MNT for diabetes. A comprehensive assessment/training record has been sent to medical records (see media tab) to associate with this encounter.      Hemoglobin A1c = 6.4%      Ms. Plascencia has been encouraged to call our office with questions or additional education needs. Please place referral for additional services or follow-up should need arise.     Thank you for the referral     Erin Webber RD, STEPHANI, IVANNA    Electronically signed by:  Erin Webber RD  02/12/25 14:50 EST

## 2025-02-25 ENCOUNTER — LAB (OUTPATIENT)
Dept: LAB | Facility: HOSPITAL | Age: 85
End: 2025-02-25
Payer: MEDICARE

## 2025-02-25 ENCOUNTER — TRANSCRIBE ORDERS (OUTPATIENT)
Dept: LAB | Facility: HOSPITAL | Age: 85
End: 2025-02-25
Payer: MEDICARE

## 2025-02-25 DIAGNOSIS — E03.9 MYXEDEMA HEART DISEASE: Primary | ICD-10-CM

## 2025-02-25 DIAGNOSIS — E78.5 HYPERLIPIDEMIA, UNSPECIFIED HYPERLIPIDEMIA TYPE: ICD-10-CM

## 2025-02-25 DIAGNOSIS — Z00.00 ROUTINE GENERAL MEDICAL EXAMINATION AT A HEALTH CARE FACILITY: ICD-10-CM

## 2025-02-25 DIAGNOSIS — E55.9 VITAMIN D DEFICIENCY: ICD-10-CM

## 2025-02-25 DIAGNOSIS — E11.9 DIABETES MELLITUS WITHOUT COMPLICATION: ICD-10-CM

## 2025-02-25 DIAGNOSIS — I51.9 MYXEDEMA HEART DISEASE: ICD-10-CM

## 2025-02-25 DIAGNOSIS — E03.9 MYXEDEMA HEART DISEASE: ICD-10-CM

## 2025-02-25 DIAGNOSIS — I51.9 MYXEDEMA HEART DISEASE: Primary | ICD-10-CM

## 2025-02-25 LAB
25(OH)D3 SERPL-MCNC: 52.3 NG/ML (ref 30–100)
ALBUMIN SERPL-MCNC: 4.1 G/DL (ref 3.5–5.2)
ALBUMIN UR-MCNC: <1.2 MG/DL
ALBUMIN/GLOB SERPL: 1.8 G/DL
ALP SERPL-CCNC: 98 U/L (ref 39–117)
ALT SERPL W P-5'-P-CCNC: 23 U/L (ref 1–33)
ANION GAP SERPL CALCULATED.3IONS-SCNC: 14 MMOL/L (ref 5–15)
AST SERPL-CCNC: 25 U/L (ref 1–32)
BASOPHILS # BLD AUTO: 0.04 10*3/MM3 (ref 0–0.2)
BASOPHILS NFR BLD AUTO: 0.4 % (ref 0–1.5)
BILIRUB SERPL-MCNC: 0.3 MG/DL (ref 0–1.2)
BILIRUB UR QL STRIP: NEGATIVE
BUN SERPL-MCNC: 22 MG/DL (ref 8–23)
BUN/CREAT SERPL: 26.8 (ref 7–25)
CALCIUM SPEC-SCNC: 9.7 MG/DL (ref 8.6–10.5)
CHLORIDE SERPL-SCNC: 95 MMOL/L (ref 98–107)
CHOLEST SERPL-MCNC: 140 MG/DL (ref 0–200)
CK SERPL-CCNC: 219 U/L (ref 20–180)
CLARITY UR: CLEAR
CO2 SERPL-SCNC: 23 MMOL/L (ref 22–29)
COLOR UR: YELLOW
CREAT SERPL-MCNC: 0.82 MG/DL (ref 0.57–1)
CREAT UR-MCNC: 58 MG/DL
DEPRECATED RDW RBC AUTO: 45.3 FL (ref 37–54)
EGFRCR SERPLBLD CKD-EPI 2021: 70.6 ML/MIN/1.73
EOSINOPHIL # BLD AUTO: 0.13 10*3/MM3 (ref 0–0.4)
EOSINOPHIL NFR BLD AUTO: 1.2 % (ref 0.3–6.2)
ERYTHROCYTE [DISTWIDTH] IN BLOOD BY AUTOMATED COUNT: 12.7 % (ref 12.3–15.4)
FOLATE SERPL-MCNC: 14.4 NG/ML (ref 4.78–24.2)
GLOBULIN UR ELPH-MCNC: 2.3 GM/DL
GLUCOSE SERPL-MCNC: 102 MG/DL (ref 65–99)
GLUCOSE UR STRIP-MCNC: NEGATIVE MG/DL
HBA1C MFR BLD: 6.2 % (ref 4.8–5.6)
HCT VFR BLD AUTO: 38.6 % (ref 34–46.6)
HDLC SERPL-MCNC: 55 MG/DL (ref 40–60)
HGB BLD-MCNC: 12.5 G/DL (ref 12–15.9)
HGB UR QL STRIP.AUTO: NEGATIVE
IMM GRANULOCYTES # BLD AUTO: 0.04 10*3/MM3 (ref 0–0.05)
IMM GRANULOCYTES NFR BLD AUTO: 0.4 % (ref 0–0.5)
KETONES UR QL STRIP: NEGATIVE
LDLC SERPL CALC-MCNC: 68 MG/DL (ref 0–100)
LDLC/HDLC SERPL: 1.22 {RATIO}
LEUKOCYTE ESTERASE UR QL STRIP.AUTO: NEGATIVE
LYMPHOCYTES # BLD AUTO: 1.7 10*3/MM3 (ref 0.7–3.1)
LYMPHOCYTES NFR BLD AUTO: 15.2 % (ref 19.6–45.3)
MCH RBC QN AUTO: 31.4 PG (ref 26.6–33)
MCHC RBC AUTO-ENTMCNC: 32.4 G/DL (ref 31.5–35.7)
MCV RBC AUTO: 97 FL (ref 79–97)
MICROALBUMIN/CREAT UR: NORMAL MG/G{CREAT}
MONOCYTES # BLD AUTO: 0.96 10*3/MM3 (ref 0.1–0.9)
MONOCYTES NFR BLD AUTO: 8.6 % (ref 5–12)
NEUTROPHILS NFR BLD AUTO: 74.2 % (ref 42.7–76)
NEUTROPHILS NFR BLD AUTO: 8.3 10*3/MM3 (ref 1.7–7)
NITRITE UR QL STRIP: NEGATIVE
NRBC BLD AUTO-RTO: 0 /100 WBC (ref 0–0.2)
PH UR STRIP.AUTO: 6.5 [PH] (ref 5–8)
PLATELET # BLD AUTO: 219 10*3/MM3 (ref 140–450)
PMV BLD AUTO: 9.2 FL (ref 6–12)
POTASSIUM SERPL-SCNC: 4.3 MMOL/L (ref 3.5–5.2)
PROT SERPL-MCNC: 6.4 G/DL (ref 6–8.5)
PROT UR QL STRIP: NEGATIVE
RBC # BLD AUTO: 3.98 10*6/MM3 (ref 3.77–5.28)
SODIUM SERPL-SCNC: 132 MMOL/L (ref 136–145)
SP GR UR STRIP: 1.01 (ref 1–1.03)
T4 FREE SERPL-MCNC: 1.38 NG/DL (ref 0.92–1.68)
TRIGL SERPL-MCNC: 89 MG/DL (ref 0–150)
TSH SERPL DL<=0.05 MIU/L-ACNC: 17.6 UIU/ML (ref 0.27–4.2)
UROBILINOGEN UR QL STRIP: NORMAL
VIT B12 BLD-MCNC: 957 PG/ML (ref 211–946)
VLDLC SERPL-MCNC: 17 MG/DL (ref 5–40)
WBC NRBC COR # BLD AUTO: 11.17 10*3/MM3 (ref 3.4–10.8)

## 2025-02-25 PROCEDURE — 80053 COMPREHEN METABOLIC PANEL: CPT

## 2025-02-25 PROCEDURE — 82306 VITAMIN D 25 HYDROXY: CPT

## 2025-02-25 PROCEDURE — 83036 HEMOGLOBIN GLYCOSYLATED A1C: CPT

## 2025-02-25 PROCEDURE — 84443 ASSAY THYROID STIM HORMONE: CPT

## 2025-02-25 PROCEDURE — 82607 VITAMIN B-12: CPT

## 2025-02-25 PROCEDURE — 82746 ASSAY OF FOLIC ACID SERUM: CPT

## 2025-02-25 PROCEDURE — 84439 ASSAY OF FREE THYROXINE: CPT

## 2025-02-25 PROCEDURE — 80061 LIPID PANEL: CPT

## 2025-02-25 PROCEDURE — 36415 COLL VENOUS BLD VENIPUNCTURE: CPT

## 2025-02-25 PROCEDURE — 81003 URINALYSIS AUTO W/O SCOPE: CPT

## 2025-02-25 PROCEDURE — 82570 ASSAY OF URINE CREATININE: CPT

## 2025-02-25 PROCEDURE — 85025 COMPLETE CBC W/AUTO DIFF WBC: CPT

## 2025-02-25 PROCEDURE — 82043 UR ALBUMIN QUANTITATIVE: CPT

## 2025-02-25 PROCEDURE — 82550 ASSAY OF CK (CPK): CPT

## 2025-03-03 ENCOUNTER — OFFICE (OUTPATIENT)
Dept: URBAN - METROPOLITAN AREA CLINIC 76 | Facility: CLINIC | Age: 85
End: 2025-03-03
Payer: MEDICARE

## 2025-03-03 VITALS
WEIGHT: 197 LBS | SYSTOLIC BLOOD PRESSURE: 130 MMHG | HEIGHT: 63 IN | HEART RATE: 80 BPM | OXYGEN SATURATION: 95 % | DIASTOLIC BLOOD PRESSURE: 90 MMHG

## 2025-03-03 DIAGNOSIS — K21.9 GASTRO-ESOPHAGEAL REFLUX DISEASE WITHOUT ESOPHAGITIS: ICD-10-CM

## 2025-03-03 DIAGNOSIS — R68.81 EARLY SATIETY: ICD-10-CM

## 2025-03-03 DIAGNOSIS — R11.0 NAUSEA: ICD-10-CM

## 2025-03-03 PROCEDURE — 99214 OFFICE O/P EST MOD 30 MIN: CPT

## 2025-03-03 RX ORDER — ONDANSETRON 4 MG/1
12 TABLET, ORALLY DISINTEGRATING ORAL
Qty: 45 | Refills: 4 | Status: ACTIVE
Start: 2025-03-03

## 2025-03-29 ENCOUNTER — APPOINTMENT (OUTPATIENT)
Dept: ULTRASOUND IMAGING | Facility: HOSPITAL | Age: 85
End: 2025-03-29
Payer: MEDICARE

## 2025-03-29 ENCOUNTER — APPOINTMENT (OUTPATIENT)
Dept: GENERAL RADIOLOGY | Facility: HOSPITAL | Age: 85
End: 2025-03-29
Payer: MEDICARE

## 2025-03-29 ENCOUNTER — HOSPITAL ENCOUNTER (EMERGENCY)
Facility: HOSPITAL | Age: 85
Discharge: HOME | End: 2025-03-29
Attending: STUDENT IN AN ORGANIZED HEALTH CARE EDUCATION/TRAINING PROGRAM | Admitting: STUDENT IN AN ORGANIZED HEALTH CARE EDUCATION/TRAINING PROGRAM
Payer: MEDICARE

## 2025-03-29 VITALS
TEMPERATURE: 98 F | HEIGHT: 62 IN | DIASTOLIC BLOOD PRESSURE: 82 MMHG | OXYGEN SATURATION: 94 % | HEART RATE: 67 BPM | BODY MASS INDEX: 36.62 KG/M2 | RESPIRATION RATE: 16 BRPM | WEIGHT: 199 LBS | SYSTOLIC BLOOD PRESSURE: 156 MMHG

## 2025-03-29 DIAGNOSIS — R60.0 BILATERAL LOWER EXTREMITY EDEMA: Primary | ICD-10-CM

## 2025-03-29 DIAGNOSIS — R03.0 ELEVATED BLOOD PRESSURE READING: ICD-10-CM

## 2025-03-29 LAB
ALBUMIN SERPL-MCNC: 4.3 G/DL (ref 3.5–5.2)
ALBUMIN/GLOB SERPL: 1.8 G/DL
ALP SERPL-CCNC: 103 U/L (ref 39–117)
ALT SERPL W P-5'-P-CCNC: 18 U/L (ref 1–33)
ANION GAP SERPL CALCULATED.3IONS-SCNC: 9.6 MMOL/L (ref 5–15)
AST SERPL-CCNC: 22 U/L (ref 1–32)
BASOPHILS # BLD AUTO: 0.02 10*3/MM3 (ref 0–0.2)
BASOPHILS NFR BLD AUTO: 0.3 % (ref 0–1.5)
BILIRUB SERPL-MCNC: 0.3 MG/DL (ref 0–1.2)
BUN SERPL-MCNC: 28 MG/DL (ref 8–23)
BUN/CREAT SERPL: 34.1 (ref 7–25)
CALCIUM SPEC-SCNC: 9.5 MG/DL (ref 8.6–10.5)
CHLORIDE SERPL-SCNC: 95 MMOL/L (ref 98–107)
CO2 SERPL-SCNC: 25.4 MMOL/L (ref 22–29)
CREAT SERPL-MCNC: 0.82 MG/DL (ref 0.57–1)
DEPRECATED RDW RBC AUTO: 48 FL (ref 37–54)
EGFRCR SERPLBLD CKD-EPI 2021: 70.6 ML/MIN/1.73
EOSINOPHIL # BLD AUTO: 0.42 10*3/MM3 (ref 0–0.4)
EOSINOPHIL NFR BLD AUTO: 5.3 % (ref 0.3–6.2)
ERYTHROCYTE [DISTWIDTH] IN BLOOD BY AUTOMATED COUNT: 13.7 % (ref 12.3–15.4)
GEN 5 1HR TROPONIN T REFLEX: 11 NG/L
GLOBULIN UR ELPH-MCNC: 2.4 GM/DL
GLUCOSE SERPL-MCNC: 138 MG/DL (ref 65–99)
HCT VFR BLD AUTO: 35.4 % (ref 34–46.6)
HGB BLD-MCNC: 11.8 G/DL (ref 12–15.9)
HOLD SPECIMEN: NORMAL
HOLD SPECIMEN: NORMAL
IMM GRANULOCYTES # BLD AUTO: 0.01 10*3/MM3 (ref 0–0.05)
IMM GRANULOCYTES NFR BLD AUTO: 0.1 % (ref 0–0.5)
LYMPHOCYTES # BLD AUTO: 1.54 10*3/MM3 (ref 0.7–3.1)
LYMPHOCYTES NFR BLD AUTO: 19.5 % (ref 19.6–45.3)
MCH RBC QN AUTO: 31.5 PG (ref 26.6–33)
MCHC RBC AUTO-ENTMCNC: 33.3 G/DL (ref 31.5–35.7)
MCV RBC AUTO: 94.4 FL (ref 79–97)
MONOCYTES # BLD AUTO: 0.59 10*3/MM3 (ref 0.1–0.9)
MONOCYTES NFR BLD AUTO: 7.5 % (ref 5–12)
NEUTROPHILS NFR BLD AUTO: 5.33 10*3/MM3 (ref 1.7–7)
NEUTROPHILS NFR BLD AUTO: 67.3 % (ref 42.7–76)
NT-PROBNP SERPL-MCNC: <36 PG/ML (ref 0–1800)
PLATELET # BLD AUTO: 259 10*3/MM3 (ref 140–450)
PMV BLD AUTO: 8.7 FL (ref 6–12)
POTASSIUM SERPL-SCNC: 3.9 MMOL/L (ref 3.5–5.2)
PROT SERPL-MCNC: 6.7 G/DL (ref 6–8.5)
RBC # BLD AUTO: 3.75 10*6/MM3 (ref 3.77–5.28)
SODIUM SERPL-SCNC: 130 MMOL/L (ref 136–145)
TROPONIN T NUMERIC DELTA: -1 NG/L
TROPONIN T SERPL HS-MCNC: 12 NG/L
WBC NRBC COR # BLD AUTO: 7.91 10*3/MM3 (ref 3.4–10.8)
WHOLE BLOOD HOLD COAG: NORMAL
WHOLE BLOOD HOLD SPECIMEN: NORMAL

## 2025-03-29 PROCEDURE — 99283 EMERGENCY DEPT VISIT LOW MDM: CPT | Performed by: NURSE PRACTITIONER

## 2025-03-29 PROCEDURE — G0463 HOSPITAL OUTPT CLINIC VISIT: HCPCS | Performed by: NURSE PRACTITIONER

## 2025-03-29 PROCEDURE — 99284 EMERGENCY DEPT VISIT MOD MDM: CPT

## 2025-03-29 PROCEDURE — 93970 EXTREMITY STUDY: CPT

## 2025-03-29 PROCEDURE — 71045 X-RAY EXAM CHEST 1 VIEW: CPT

## 2025-03-29 PROCEDURE — 83880 ASSAY OF NATRIURETIC PEPTIDE: CPT | Performed by: STUDENT IN AN ORGANIZED HEALTH CARE EDUCATION/TRAINING PROGRAM

## 2025-03-29 PROCEDURE — 36415 COLL VENOUS BLD VENIPUNCTURE: CPT

## 2025-03-29 PROCEDURE — 85025 COMPLETE CBC W/AUTO DIFF WBC: CPT | Performed by: NURSE PRACTITIONER

## 2025-03-29 PROCEDURE — 84484 ASSAY OF TROPONIN QUANT: CPT | Performed by: NURSE PRACTITIONER

## 2025-03-29 PROCEDURE — 80053 COMPREHEN METABOLIC PANEL: CPT | Performed by: NURSE PRACTITIONER

## 2025-03-29 RX ORDER — SODIUM CHLORIDE 0.9 % (FLUSH) 0.9 %
10 SYRINGE (ML) INJECTION AS NEEDED
Status: DISCONTINUED | OUTPATIENT
Start: 2025-03-29 | End: 2025-03-29 | Stop reason: HOSPADM

## 2025-03-29 NOTE — DISCHARGE INSTRUCTIONS
Compression stockings  Increase your water  Limit salt    Keep eye on your Blood pressure as it was slightly elevated today.    Return Precautions    Although you are being discharged from the ED today, I encourage you to return for worsening symptoms.  Things can, and do, change such that treatment at home with medication may not be adequate.      Specifically, return for any of the following:    Chest pain, shortness of breath, pain or nausea and vomiting not controlled by medications provided.    Please make a follow up with your Primary Care Provider for a blood pressure recheck.

## 2025-03-29 NOTE — [facility] PROVIDER NOTE
EMERGENCY DEPARTMENT ENCOUNTER    Room Number:  01/01  Date seen:  3/30/2025  Time seen: 16:47 EDT  PCP: Katarzyna Bland MD  Historian: Patient    Discussed/obtained information from independent historians: n/a    HPI:  Chief complaint:BLE edema  A complete HPI/ROS/PMH/PSH/SH/FH are unobtainable due to: n/a  Context:Madisyn Plascencia is a 84 y.o. female with knee arthritis, parotid mass, anxiety, GERD, type 2 diabetes, chest pain, hypertension and recent left shoulder reverse replacement who presents to the ED with c/o BLE edema that has been going on since Surgery.  She is able to ambulate and states the swelling improves with putting her feet up but is mainly in her ankles.  She does not wear compression stockings.  She denies any shortness of breath, chest tightness, h/o CHF.  She denies DVT history and is not anticoagulated.     External (non-ED) record review: I reviewed recent discharge summary dated March 6, 2025.  Patient had left reverse total shoulder arthroplasty performed on 3/5/2025.    Chronic or social conditions impacting care:n/a    ALLERGIES  Sulfa antibiotics    PAST MEDICAL HISTORY  Active Ambulatory Problems     Diagnosis Date Noted    Primary osteoarthritis of left knee 10/07/2022    Parotid mass 08/08/2023    Hypoxia 09/01/2023    Wheezing 09/01/2023    Recent URI 09/01/2023    Hypothyroidism (acquired) 09/01/2023    Gastroesophageal reflux disease 09/01/2023    Essential hypertension 09/01/2023    Type 2 diabetes mellitus with hyperglycemia 09/03/2023    Anxiety 10/06/2023    Grief 10/06/2023    Tremor 10/06/2023    Chest pain 10/22/2024    Elevated TSH 10/23/2024    Elevated blood pressure reading 10/23/2024    Current chronic use of systemic steroids 01/24/2025     Resolved Ambulatory Problems     Diagnosis Date Noted    No Resolved Ambulatory Problems     Past Medical History:   Diagnosis Date    Arthritis     C. difficile colitis     Difficulty swallowing pills     Disease of thyroid  gland     Diverticulitis     GERD (gastroesophageal reflux disease)     H/O uterine prolapse     History of esophageal dilatation     Hyperlipidemia     Hypertension     Hypothyroidism     Knee swelling     OCD (obsessive compulsive disorder)     Osteopenia     Periarthritis of shoulder 2022    Prediabetes     Recent weight loss     Rotator cuff syndrome 2022    Scoliosis     Shoulder injury     Tear of meniscus of knee     Uterine prolapse        PAST SURGICAL HISTORY  Past Surgical History:   Procedure Laterality Date    CHOLECYSTECTOMY      COLONOSCOPY      ENDOSCOPY      ENDOSCOPY N/A 2021    Procedure: ESOPHAGOGASTRODUODENOSCOPY WITH 52 Turkmen BRADFORD DILATION;  Surgeon: Tripp Pascal MD;  Location: [facility] ENDOSCOPY;  Service: Gastroenterology;  Laterality: N/A;  PRE- DYSPHAGIA  POST- ESOPHAGEAL RING    ENDOSCOPY N/A 11/3/2023    Procedure: ESOPHAGOGASTRODUODENOSCOPY with biopsy and bradford dialation;  Surgeon: Tripp Pascal MD;  Location: [facility] ENDOSCOPY;  Service: Gastroenterology;  Laterality: N/A;  pre- dysphagia   post-    JOINT REPLACEMENT Right     KNEE    TONSILLECTOMY      TOTAL KNEE ARTHROPLASTY Left 2022    Procedure: LEFT TOTAL KNEE ARTHROPLASTY;  Surgeon: Antonio Riggins MD;  Location[place] [facility] [place] [facility];  Service: Orthopedics;  Laterality: Left;       FAMILY HISTORY  Family History   Problem Relation Age of Onset    Cancer Mother          1964    Early death Mother     Heart attack Father     Heart disease Father     Migraines Daughter     Malig Hyperthermia Neg Hx        SOCIAL HISTORY  Social History     Socioeconomic History    Marital status:    Tobacco Use    Smoking status: Never     Passive exposure: Never    Smokeless tobacco: Never    Tobacco comments:     Tried in college... didn't last   Vaping Use    Vaping status: Never Used   Substance and Sexual Activity    Alcohol use: Not Currently    Drug use: Never    Sexual activity: Not  Currently     Partners: Male     Birth control/protection: Pill, Diaphragm, Birth control pill     Comment: I’m 82… N/A       REVIEW OF SYSTEMS  Review of Systems    All systems reviewed and negative except for those discussed in HPI.     PHYSICAL EXAM    I have reviewed the triage vital signs and nursing notes.  Vitals:    03/29/25 1725   BP: 156/82   Pulse: 67   Resp: 16   Temp:    SpO2: 94%     Physical Exam    GENERAL: not distressed  HENT: nares patent  EYES: no scleral icterus  NECK: no ROM limitations  CV: regular rhythm, regular rate, no murmur  RESPIRATORY: normal effort, CTAB  ABDOMEN: soft  : deferred  MUSCULOSKELETAL: no deformity or bony tenderness to BLE's.  There is edema below the knees to ankles,  non-pitting.  The pedal pulses are 2+ DP/PT.  Normal sensation.  Pt is wearing specialized sling to the LUE from recent reverse left shoulder surgery.   NEURO: alert, moves all extremities, follows commands  SKIN: warm, dry    LAB RESULTS  Recent Results (from the past 24 hours)   BNP    Collection Time: 03/29/25  4:19 PM    Specimen: Blood   Result Value Ref Range    proBNP <36.0 0.0 - 1,800.0 pg/mL   Green Top (Gel)    Collection Time: 03/29/25  4:19 PM   Result Value Ref Range    Extra Tube Hold for add-ons.    Lavender Top    Collection Time: 03/29/25  4:19 PM   Result Value Ref Range    Extra Tube hold for add-on    Gold Top - SST    Collection Time: 03/29/25  4:19 PM   Result Value Ref Range    Extra Tube Hold for add-ons.    Light Blue Top    Collection Time: 03/29/25  4:19 PM   Result Value Ref Range    Extra Tube Hold for add-ons.    Comprehensive Metabolic Panel    Collection Time: 03/29/25  4:19 PM    Specimen: Blood   Result Value Ref Range    Glucose 138 (H) 65 - 99 mg/dL    BUN 28 (H) 8 - 23 mg/dL    Creatinine 0.82 0.57 - 1.00 mg/dL    Sodium 130 (L) 136 - 145 mmol/L    Potassium 3.9 3.5 - 5.2 mmol/L    Chloride 95 (L) 98 - 107 mmol/L    CO2 25.4 22.0 - 29.0 mmol/L    Calcium 9.5 8.6 -  10.5 mg/dL    Total Protein 6.7 6.0 - 8.5 g/dL    Albumin 4.3 3.5 - 5.2 g/dL    ALT (SGPT) 18 1 - 33 U/L    AST (SGOT) 22 1 - 32 U/L    Alkaline Phosphatase 103 39 - 117 U/L    Total Bilirubin 0.3 0.0 - 1.2 mg/dL    Globulin 2.4 gm/dL    A/G Ratio 1.8 g/dL    BUN/Creatinine Ratio 34.1 (H) 7.0 - 25.0    Anion Gap 9.6 5.0 - 15.0 mmol/L    eGFR 70.6 >60.0 mL/min/1.73   High Sensitivity Troponin T    Collection Time: 03/29/25  4:19 PM    Specimen: Blood   Result Value Ref Range    HS Troponin T 12 <14 ng/L   CBC Auto Differential    Collection Time: 03/29/25  4:19 PM    Specimen: Blood   Result Value Ref Range    WBC 7.91 3.40 - 10.80 10*3/mm3    RBC 3.75 (L) 3.77 - 5.28 10*6/mm3    Hemoglobin 11.8 (L) 12.0 - 15.9 g/dL    Hematocrit 35.4 34.0 - 46.6 %    MCV 94.4 79.0 - 97.0 fL    MCH 31.5 26.6 - 33.0 pg    MCHC 33.3 31.5 - 35.7 g/dL    RDW 13.7 12.3 - 15.4 %    RDW-SD 48.0 37.0 - 54.0 fl    MPV 8.7 6.0 - 12.0 fL    Platelets 259 140 - 450 10*3/mm3    Neutrophil % 67.3 42.7 - 76.0 %    Lymphocyte % 19.5 (L) 19.6 - 45.3 %    Monocyte % 7.5 5.0 - 12.0 %    Eosinophil % 5.3 0.3 - 6.2 %    Basophil % 0.3 0.0 - 1.5 %    Immature Grans % 0.1 0.0 - 0.5 %    Neutrophils, Absolute 5.33 1.70 - 7.00 10*3/mm3    Lymphocytes, Absolute 1.54 0.70 - 3.10 10*3/mm3    Monocytes, Absolute 0.59 0.10 - 0.90 10*3/mm3    Eosinophils, Absolute 0.42 (H) 0.00 - 0.40 10*3/mm3    Basophils, Absolute 0.02 0.00 - 0.20 10*3/mm3    Immature Grans, Absolute 0.01 0.00 - 0.05 10*3/mm3   High Sensitivity Troponin T 1Hr    Collection Time: 03/29/25  5:24 PM    Specimen: Blood   Result Value Ref Range    HS Troponin T 11 <14 ng/L    Troponin T Numeric Delta -1 Abnormal if >/=3 ng/L       Ordered the above labs and independently interpreted results.  My findings will be discussed in the ED course or medical decision making section below    RADIOLOGY RESULTS  XR Chest 1 View  Result Date: 3/29/2025  Portable chest radiograph  HISTORY: Leg swelling  TECHNIQUE:  Single AP portable radiograph of the chest  COMPARISON: Chest radiograph 10/22/2024      FINDINGS AND IMPRESSION: No pulmonary consolidation or pneumothorax is seen. Sequela of prior granulomatous disease is present. Left shoulder arthroplasty is incompletely visualized and cannot be evaluated. Cardiac silhouette is at the upper limits of normal to borderline enlarged in size.  This report was finalized on 3/29/2025 6:43 PM by Dr. Jose Shaw M.D on Workstation: [id number]      US Venous Doppler Lower Extremity Bilateral (duplex)  Result Date: 3/29/2025  Patient: SHANAE LAGUNA  Time Out: 18:31 Exam(s): US VENOUS BILATERAL LOWER EXTREMITIES EXAM:   US Duplex Bilateral Lower Extremities Veins CLINICAL HISTORY:    Reason for exam: BLE edema, recent total shoulder replacement. TECHNIQUE:   Real-time duplex ultrasound scan of the bilateral lower extremity veins integrating B-mode two-dimensional vascular structure, Doppler spectral analysis, color flow Doppler imaging and compression. COMPARISON:   No relevant prior studies available. FINDINGS:   Right deep veins:  Unremarkable.  No DVT in the right common femoral, femoral, proximal deep femoral or popliteal veins.  The veins demonstrate normal color flow, are normally compressible, with normal phasic flow and or augmentation response.   Right superficial veins:  Unremarkable.  No thrombus in the visualized right great saphenous vein.   Left deep veins:  Unremarkable.  No DVT in the left common femoral, femoral, proximal deep femoral or popliteal veins.  The veins demonstrate normal color flow, are normally compressible, with normal phasic flow and or augmentation response.   Left superficial veins:  Unremarkable.  No thrombus in the visualized left great saphenous vein.   Soft tissues:  No acute findings.  No popliteal cyst. IMPRESSION:       Normal bilateral lower extremity duplex venous ultrasound.     Electronically signed by Antonio Coronel MD on 03-29-25 at 1831        Ordered the above noted radiological studies.  Independently interpreted by me.  My findings will be discussed in the medical decision section below.     PROGRESS, DATA ANALYSIS, CONSULTS AND MEDICAL DECISION MAKING    Please note that this section constitutes my independent interpretation of clinical data including lab results, radiology, EKG's.  This constitutes my independent professional opinion regarding differential diagnosis and management of this patient.  It may include any factors such as history from outside sources, review of external records, social determinants of health, management of medications, response to those treatments, and discussions with other providers.       Orders placed during this visit:  Orders Placed This Encounter   Procedures    XR Chest 1 View    US Venous Doppler Lower Extremity Bilateral (duplex)    [place] Draw    BNP    Comprehensive Metabolic Panel    High Sensitivity Troponin T    CBC Auto Differential    High Sensitivity Troponin T 1Hr    Green Top (Gel)    Lavender Top    Gold Top - SST    Light Blue Top    CBC & Differential    ED Acknowledgement Form Needed;            Medical Decision Making  Problems Addressed:  Bilateral lower extremity edema: complicated acute illness or injury  Elevated blood pressure reading: complicated acute illness or injury    Amount and/or Complexity of Data Reviewed  Labs: ordered.  Radiology: ordered.    Risk  Prescription drug management.    Patient presents with bilateral lower extremity swelling that has been ongoing ever since her left shoulder reverse replacement surgery earlier this month.  It is waxing and waning.  I considered CHF but the patient has normal BNP, no rales in her lungs or history of CHF.  Her labs are reassuring.  I considered DVT given that she recently had surgery and ultrasound negative. Ultrasounds of BLE negative for blood clots.  Pt has no tachycardia or hypoxia, no labored breathing.  Pt and daughter will  pursue some compression stockings, pulses palpable and she ambulates.         DIAGNOSIS  Final diagnoses:   Bilateral lower extremity edema   Elevated blood pressure reading          Medication List        Changed      pantoprazole 40 MG EC tablet  Commonly known as: PROTONIX  Take 1 tablet by mouth 2 (Two) Times a Day.  What changed: additional instructions              FOLLOW-UP  Katarzyna Bland MD  [id number]  [address] [address]  [phone]    Schedule an appointment as soon as possible for a visit in 3 days  As needed, If symptoms worsen        Latest Documented Vital Signs:  As of 07:30 EDT  BP- 156/82 HR- 67 Temp- 98 °F (36.7 °C) (Oral) O2 sat- 94%    Appropriate PPE utilized throughout this patient encounter to include mask, if indicated, per current protocol. Hand hygiene was performed before donning PPE and after removal when leaving the room.    Please note that portions of this were completed with a voice recognition program.     Note Disclaimer: At [facility], we believe that sharing information builds trust and better relationships. You are receiving this note because you are receiving care at [facility] or recently visited. It is possible you will see health information before a provider has talked with you about it. This kind of information can be easy to misunderstand. To help you fully understand what it means for your health, we urge you to discuss this note with your provider.

## 2025-04-07 ENCOUNTER — LAB (OUTPATIENT)
Dept: LAB | Facility: HOSPITAL | Age: 85
End: 2025-04-07
Payer: MEDICARE

## 2025-04-07 ENCOUNTER — TRANSCRIBE ORDERS (OUTPATIENT)
Dept: ADMINISTRATIVE | Facility: HOSPITAL | Age: 85
End: 2025-04-07
Payer: MEDICARE

## 2025-04-07 DIAGNOSIS — I51.9 MYXEDEMA HEART DISEASE: ICD-10-CM

## 2025-04-07 DIAGNOSIS — I51.9 MYXEDEMA HEART DISEASE: Primary | ICD-10-CM

## 2025-04-07 DIAGNOSIS — E03.9 MYXEDEMA HEART DISEASE: ICD-10-CM

## 2025-04-07 DIAGNOSIS — R73.03 DIABETES MELLITUS, LATENT: ICD-10-CM

## 2025-04-07 DIAGNOSIS — R60.0 LOCALIZED EDEMA: ICD-10-CM

## 2025-04-07 DIAGNOSIS — E03.9 MYXEDEMA HEART DISEASE: Primary | ICD-10-CM

## 2025-04-07 LAB
ALBUMIN SERPL-MCNC: 4.2 G/DL (ref 3.5–5.2)
ALBUMIN/GLOB SERPL: 1.8 G/DL
ALP SERPL-CCNC: 102 U/L (ref 39–117)
ALT SERPL W P-5'-P-CCNC: 18 U/L (ref 1–33)
ANION GAP SERPL CALCULATED.3IONS-SCNC: 10.6 MMOL/L (ref 5–15)
AST SERPL-CCNC: 22 U/L (ref 1–32)
BACTERIA UR QL AUTO: ABNORMAL /HPF
BILIRUB SERPL-MCNC: 0.3 MG/DL (ref 0–1.2)
BILIRUB UR QL STRIP: NEGATIVE
BUN SERPL-MCNC: 20 MG/DL (ref 8–23)
BUN/CREAT SERPL: 24.7 (ref 7–25)
CALCIUM SPEC-SCNC: 9.9 MG/DL (ref 8.6–10.5)
CHLORIDE SERPL-SCNC: 100 MMOL/L (ref 98–107)
CLARITY UR: ABNORMAL
CO2 SERPL-SCNC: 24.4 MMOL/L (ref 22–29)
COLOR UR: YELLOW
CREAT SERPL-MCNC: 0.81 MG/DL (ref 0.57–1)
EGFRCR SERPLBLD CKD-EPI 2021: 71.7 ML/MIN/1.73
GLOBULIN UR ELPH-MCNC: 2.4 GM/DL
GLUCOSE SERPL-MCNC: 95 MG/DL (ref 65–99)
GLUCOSE UR STRIP-MCNC: NEGATIVE MG/DL
HBA1C MFR BLD: 6.3 % (ref 4.8–5.6)
HGB UR QL STRIP.AUTO: NEGATIVE
HYALINE CASTS UR QL AUTO: ABNORMAL /LPF
KETONES UR QL STRIP: NEGATIVE
LEUKOCYTE ESTERASE UR QL STRIP.AUTO: ABNORMAL
NITRITE UR QL STRIP: NEGATIVE
PH UR STRIP.AUTO: 6 [PH] (ref 5–8)
POTASSIUM SERPL-SCNC: 4.3 MMOL/L (ref 3.5–5.2)
PROT SERPL-MCNC: 6.6 G/DL (ref 6–8.5)
PROT UR QL STRIP: NEGATIVE
RBC # UR STRIP: ABNORMAL /HPF
REF LAB TEST METHOD: ABNORMAL
SODIUM SERPL-SCNC: 135 MMOL/L (ref 136–145)
SP GR UR STRIP: 1.02 (ref 1–1.03)
SQUAMOUS #/AREA URNS HPF: ABNORMAL /HPF
T4 FREE SERPL-MCNC: 1.1 NG/DL (ref 0.92–1.68)
TSH SERPL DL<=0.05 MIU/L-ACNC: 25.1 UIU/ML (ref 0.27–4.2)
UROBILINOGEN UR QL STRIP: ABNORMAL
WBC # UR STRIP: ABNORMAL /HPF

## 2025-04-07 PROCEDURE — 81001 URINALYSIS AUTO W/SCOPE: CPT

## 2025-04-07 PROCEDURE — 36415 COLL VENOUS BLD VENIPUNCTURE: CPT

## 2025-04-07 PROCEDURE — 83036 HEMOGLOBIN GLYCOSYLATED A1C: CPT

## 2025-04-07 PROCEDURE — 80053 COMPREHEN METABOLIC PANEL: CPT

## 2025-04-07 PROCEDURE — 84439 ASSAY OF FREE THYROXINE: CPT

## 2025-04-07 PROCEDURE — 84443 ASSAY THYROID STIM HORMONE: CPT

## 2025-06-09 ENCOUNTER — TRANSCRIBE ORDERS (OUTPATIENT)
Dept: ADMINISTRATIVE | Facility: HOSPITAL | Age: 85
End: 2025-06-09
Payer: MEDICARE

## 2025-06-09 ENCOUNTER — LAB (OUTPATIENT)
Dept: LAB | Facility: HOSPITAL | Age: 85
End: 2025-06-09
Payer: MEDICARE

## 2025-06-09 DIAGNOSIS — E03.9 HYPOTHYROIDISM, UNSPECIFIED TYPE: Primary | ICD-10-CM

## 2025-06-09 DIAGNOSIS — E11.9 DIABETES MELLITUS WITHOUT COMPLICATION: ICD-10-CM

## 2025-06-09 DIAGNOSIS — E03.9 HYPOTHYROIDISM, UNSPECIFIED TYPE: ICD-10-CM

## 2025-06-09 LAB
HBA1C MFR BLD: 6.1 % (ref 4.8–5.6)
T4 FREE SERPL-MCNC: 1.33 NG/DL (ref 0.92–1.68)
TSH SERPL DL<=0.05 MIU/L-ACNC: 25.7 UIU/ML (ref 0.27–4.2)

## 2025-06-09 PROCEDURE — 84439 ASSAY OF FREE THYROXINE: CPT

## 2025-06-09 PROCEDURE — 83036 HEMOGLOBIN GLYCOSYLATED A1C: CPT

## 2025-06-09 PROCEDURE — 36415 COLL VENOUS BLD VENIPUNCTURE: CPT

## 2025-06-09 PROCEDURE — 84443 ASSAY THYROID STIM HORMONE: CPT

## 2025-06-24 ENCOUNTER — TRANSCRIBE ORDERS (OUTPATIENT)
Dept: ADMINISTRATIVE | Facility: HOSPITAL | Age: 85
End: 2025-06-24
Payer: MEDICARE

## 2025-06-24 ENCOUNTER — LAB (OUTPATIENT)
Dept: LAB | Facility: HOSPITAL | Age: 85
End: 2025-06-24
Payer: MEDICARE

## 2025-06-24 DIAGNOSIS — E03.9 HYPOTHYROIDISM, UNSPECIFIED TYPE: ICD-10-CM

## 2025-06-24 DIAGNOSIS — E03.9 HYPOTHYROIDISM, UNSPECIFIED TYPE: Primary | ICD-10-CM

## 2025-06-24 LAB
T3 SERPL-MCNC: 77.5 NG/DL (ref 80–200)
T4 FREE SERPL-MCNC: 1.33 NG/DL (ref 0.92–1.68)
TSH SERPL DL<=0.05 MIU/L-ACNC: 14.6 UIU/ML (ref 0.27–4.2)

## 2025-06-24 PROCEDURE — 36415 COLL VENOUS BLD VENIPUNCTURE: CPT

## 2025-06-24 PROCEDURE — 84480 ASSAY TRIIODOTHYRONINE (T3): CPT

## 2025-06-24 PROCEDURE — 84439 ASSAY OF FREE THYROXINE: CPT

## 2025-06-24 PROCEDURE — 84443 ASSAY THYROID STIM HORMONE: CPT

## (undated) DEVICE — PK KN TOTL 40

## (undated) DEVICE — SENSR O2 OXIMAX FNGR A/ 18IN NONSTR

## (undated) DEVICE — TUBING, SUCTION, 1/4" X 10', STRAIGHT: Brand: MEDLINE

## (undated) DEVICE — STPLR SKIN VISISTAT WD 35CT

## (undated) DEVICE — GLV SURG PREMIERPRO ORTHO LTX PF SZ7.5 BRN

## (undated) DEVICE — BITEBLOCK OMNI BLOC

## (undated) DEVICE — FRCP BX RADJAW4 NDL 2.8 240CM LG OG BX40

## (undated) DEVICE — UNDERCAST PADDING: Brand: DEROYAL

## (undated) DEVICE — BNDG ELAS ELITE V/CLOSE 4IN 5YD LF STRL

## (undated) DEVICE — DECANTER BAG 9": Brand: MEDLINE INDUSTRIES, INC.

## (undated) DEVICE — CANN O2 ETCO2 FITS ALL CONN CO2 SMPL A/ 7IN DISP LF

## (undated) DEVICE — KT DRN EVAC WND PVC PCH WTROC RND 10F400

## (undated) DEVICE — NEEDLE, QUINCKE, 20GX3.5": Brand: MEDLINE

## (undated) DEVICE — BLCK/BITE BLOX W/DENTL/RIM W/STRAP 54F

## (undated) DEVICE — LN SMPL CO2 SHTRM SD STREAM W/M LUER

## (undated) DEVICE — KT ORCA ORCAPOD DISP STRL

## (undated) DEVICE — ADAPT CLN BIOGUARD AIR/H2O DISP

## (undated) DEVICE — PAD,ABDOMINAL,8"X10",ST,LF: Brand: MEDLINE

## (undated) DEVICE — ANTIBACTERIAL UNDYED BRAIDED (POLYGLACTIN 910), SYNTHETIC ABSORBABLE SUTURE: Brand: COATED VICRYL

## (undated) DEVICE — DUAL CUT SAGITTAL BLADE

## (undated) DEVICE — GLV SURG BIOGEL LTX PF 7 1/2

## (undated) DEVICE — PENCL ES MEGADINE EZ/CLEAN BUTN W/HOLSTR 10FT

## (undated) DEVICE — DRAPE,REIN 53X77,STERILE: Brand: MEDLINE

## (undated) DEVICE — MSK ENDO PORT O2 POM ELITE CURAPLEX A/

## (undated) DEVICE — SYRINGE, LUER LOCK, 60ML: Brand: MEDLINE

## (undated) DEVICE — DRSNG GZ PETROLTM XEROFORM CURAD 1X8IN STRL

## (undated) DEVICE — CONTAINER,SPECIMEN,OR STERILE,4OZ: Brand: MEDLINE

## (undated) DEVICE — APPL CHLORAPREP HI/LITE 26ML ORNG